# Patient Record
Sex: FEMALE | Race: WHITE | URBAN - METROPOLITAN AREA
[De-identification: names, ages, dates, MRNs, and addresses within clinical notes are randomized per-mention and may not be internally consistent; named-entity substitution may affect disease eponyms.]

---

## 2017-01-03 ENCOUNTER — AMBULATORY - HEALTHEAST (OUTPATIENT)
Dept: ONCOLOGY | Facility: CLINIC | Age: 82
End: 2017-01-03

## 2017-01-03 DIAGNOSIS — G62.9 NEUROPATHY: ICD-10-CM

## 2017-01-10 ENCOUNTER — AMBULATORY - HEALTHEAST (OUTPATIENT)
Dept: ONCOLOGY | Facility: CLINIC | Age: 82
End: 2017-01-10

## 2017-01-10 ENCOUNTER — HOSPITAL ENCOUNTER (OUTPATIENT)
Dept: CT IMAGING | Facility: HOSPITAL | Age: 82
Setting detail: RADIATION/ONCOLOGY SERIES
Discharge: STILL ADMITTED OR ADMINISTRATIVE | End: 2017-01-10
Attending: INTERNAL MEDICINE

## 2017-01-10 DIAGNOSIS — G62.9 NEUROPATHY: ICD-10-CM

## 2017-01-10 DIAGNOSIS — C34.11 MALIGNANT NEOPLASM OF UPPER LOBE BRONCHUS, RIGHT (H): ICD-10-CM

## 2017-01-12 ENCOUNTER — OFFICE VISIT - HEALTHEAST (OUTPATIENT)
Dept: ONCOLOGY | Facility: HOSPITAL | Age: 82
End: 2017-01-12

## 2017-01-12 ENCOUNTER — AMBULATORY - HEALTHEAST (OUTPATIENT)
Dept: INFUSION THERAPY | Facility: HOSPITAL | Age: 82
End: 2017-01-12

## 2017-01-12 ENCOUNTER — INFUSION - HEALTHEAST (OUTPATIENT)
Dept: INFUSION THERAPY | Facility: HOSPITAL | Age: 82
End: 2017-01-12

## 2017-01-12 DIAGNOSIS — C34.11 MALIGNANT NEOPLASM OF UPPER LOBE BRONCHUS, RIGHT (H): ICD-10-CM

## 2017-01-12 DIAGNOSIS — N18.9 CHRONIC KIDNEY DISEASE, UNSPECIFIED: ICD-10-CM

## 2017-01-16 ENCOUNTER — OFFICE VISIT - HEALTHEAST (OUTPATIENT)
Dept: INTERNAL MEDICINE | Facility: CLINIC | Age: 82
End: 2017-01-16

## 2017-01-16 ENCOUNTER — OFFICE VISIT - HEALTHEAST (OUTPATIENT)
Dept: PODIATRY | Facility: CLINIC | Age: 82
End: 2017-01-16

## 2017-01-16 ENCOUNTER — RECORDS - HEALTHEAST (OUTPATIENT)
Dept: ADMINISTRATIVE | Facility: OTHER | Age: 82
End: 2017-01-16

## 2017-01-16 DIAGNOSIS — K25.9 GASTRIC ULCER: ICD-10-CM

## 2017-01-16 DIAGNOSIS — L60.2 ONYCHAUXIS: ICD-10-CM

## 2017-01-16 DIAGNOSIS — G62.0 DRUG-INDUCED POLYNEUROPATHY (H): ICD-10-CM

## 2017-01-16 DIAGNOSIS — E11.9 DIABETES MELLITUS (H): ICD-10-CM

## 2017-01-16 DIAGNOSIS — R07.9 CHEST PAIN: ICD-10-CM

## 2017-01-16 DIAGNOSIS — E78.5 HYPERLIPIDEMIA: ICD-10-CM

## 2017-01-16 DIAGNOSIS — M79.673 PAIN OF FOOT, UNSPECIFIED LATERALITY: ICD-10-CM

## 2017-01-16 LAB
CHOLEST SERPL-MCNC: 169 MG/DL
HBA1C MFR BLD: 5.6 % (ref 3.5–6)
HDLC SERPL-MCNC: 50 MG/DL
LDLC SERPL CALC-MCNC: 99 MG/DL
TRIGL SERPL-MCNC: 102 MG/DL

## 2017-01-16 ASSESSMENT — MIFFLIN-ST. JEOR
SCORE: 911.25
SCORE: 907.62

## 2017-01-17 ENCOUNTER — AMBULATORY - HEALTHEAST (OUTPATIENT)
Dept: ONCOLOGY | Facility: CLINIC | Age: 82
End: 2017-01-17

## 2017-01-17 DIAGNOSIS — G62.9 NEUROPATHY: ICD-10-CM

## 2017-01-18 ENCOUNTER — HOSPITAL ENCOUNTER (OUTPATIENT)
Dept: CARDIOLOGY | Facility: HOSPITAL | Age: 82
Discharge: HOME | End: 2017-01-18
Attending: INTERNAL MEDICINE

## 2017-01-18 ENCOUNTER — HOSPITAL ENCOUNTER (OUTPATIENT)
Dept: NUCLEAR MEDICINE | Facility: HOSPITAL | Age: 82
Discharge: HOME | End: 2017-01-18
Attending: INTERNAL MEDICINE

## 2017-01-18 DIAGNOSIS — R07.9 CHEST PAIN: ICD-10-CM

## 2017-01-18 LAB
CV STRESS CURRENT BP HE: NORMAL
CV STRESS CURRENT HR HE: 111
CV STRESS CURRENT HR HE: 112
CV STRESS CURRENT HR HE: 115
CV STRESS CURRENT HR HE: 116
CV STRESS CURRENT HR HE: 117
CV STRESS CURRENT HR HE: 118
CV STRESS CURRENT HR HE: 119
CV STRESS CURRENT HR HE: 120
CV STRESS CURRENT HR HE: 121
CV STRESS CURRENT HR HE: 121
CV STRESS CURRENT HR HE: 122
CV STRESS CURRENT HR HE: 122
CV STRESS CURRENT HR HE: 124
CV STRESS CURRENT HR HE: 125
CV STRESS CURRENT HR HE: 129
CV STRESS CURRENT HR HE: 129
CV STRESS CURRENT HR HE: 142
CV STRESS CURRENT HR HE: 211
CV STRESS CURRENT HR HE: 227
CV STRESS CURRENT HR HE: NORMAL
CV STRESS DEVIATION TIME HE: NORMAL
CV STRESS EXERCISE STAGE HE: NORMAL
CV STRESS FINAL RESTING BP HE: NORMAL
CV STRESS MAX HR HE: 214
CV STRESS MAX TREADMILL GRADE HE: 0
CV STRESS MAX TREADMILL SPEED HE: 0
CV STRESS PEAK DIA BP HE: NORMAL
CV STRESS PEAK SYS BP HE: NORMAL
CV STRESS PHASE HE: NORMAL
CV STRESS PROTOCOL HE: NORMAL
CV STRESS RESTING PT POSITION HE: NORMAL
CV STRESS ST DEVIATION AMOUNT HE: NORMAL
CV STRESS ST DEVIATION ELEVATION HE: NORMAL
CV STRESS ST EVELATION AMOUNT HE: NORMAL
CV STRESS TEST TYPE HE: NORMAL
CV STRESS TOTAL STAGE TIME MIN 1 HE: NORMAL
STRESS ECHO BASELINE BP: NORMAL
STRESS ECHO BASELINE HR: 88
STRESS ECHO LAST STRESS BP: NORMAL
STRESS ECHO LAST STRESS HR: 221
STRESS ECHO POST ESTIMATED WORKLOAD: 1.8
STRESS ECHO POST EXERCISE DUR MIN: 1
STRESS ECHO POST EXERCISE DUR SEC: 0

## 2017-01-19 ENCOUNTER — AMBULATORY - HEALTHEAST (OUTPATIENT)
Dept: CARDIOLOGY | Facility: CLINIC | Age: 82
End: 2017-01-19

## 2017-01-19 DIAGNOSIS — R07.9 CHEST PAIN, UNSPECIFIED TYPE: ICD-10-CM

## 2017-01-19 DIAGNOSIS — R94.39 ABNORMAL NUCLEAR STRESS TEST: ICD-10-CM

## 2017-01-20 ENCOUNTER — COMMUNICATION - HEALTHEAST (OUTPATIENT)
Dept: SCHEDULING | Facility: CLINIC | Age: 82
End: 2017-01-20

## 2017-01-20 ENCOUNTER — OFFICE VISIT - HEALTHEAST (OUTPATIENT)
Dept: INTERNAL MEDICINE | Facility: CLINIC | Age: 82
End: 2017-01-20

## 2017-01-20 DIAGNOSIS — I47.10 SUPRAVENTRICULAR TACHYCARDIA (H): ICD-10-CM

## 2017-01-20 ASSESSMENT — MIFFLIN-ST. JEOR: SCORE: 924.41

## 2017-01-22 ENCOUNTER — RECORDS - HEALTHEAST (OUTPATIENT)
Dept: ADMINISTRATIVE | Facility: OTHER | Age: 82
End: 2017-01-22

## 2017-01-24 ENCOUNTER — AMBULATORY - HEALTHEAST (OUTPATIENT)
Dept: ONCOLOGY | Facility: CLINIC | Age: 82
End: 2017-01-24

## 2017-01-24 DIAGNOSIS — T45.1X5A NEUROPATHY DUE TO CHEMOTHERAPEUTIC DRUG (H): ICD-10-CM

## 2017-01-24 DIAGNOSIS — G62.0 NEUROPATHY DUE TO CHEMOTHERAPEUTIC DRUG (H): ICD-10-CM

## 2017-01-31 ENCOUNTER — AMBULATORY - HEALTHEAST (OUTPATIENT)
Dept: ONCOLOGY | Facility: CLINIC | Age: 82
End: 2017-01-31

## 2017-01-31 DIAGNOSIS — T45.1X5A NEUROPATHY DUE TO CHEMOTHERAPEUTIC DRUG (H): ICD-10-CM

## 2017-01-31 DIAGNOSIS — G62.0 NEUROPATHY DUE TO CHEMOTHERAPEUTIC DRUG (H): ICD-10-CM

## 2017-02-01 ENCOUNTER — COMMUNICATION - HEALTHEAST (OUTPATIENT)
Dept: CARDIOLOGY | Facility: CLINIC | Age: 82
End: 2017-02-01

## 2017-02-02 ENCOUNTER — INFUSION - HEALTHEAST (OUTPATIENT)
Dept: INFUSION THERAPY | Facility: HOSPITAL | Age: 82
End: 2017-02-02

## 2017-02-02 ENCOUNTER — COMMUNICATION - HEALTHEAST (OUTPATIENT)
Dept: INTERNAL MEDICINE | Facility: CLINIC | Age: 82
End: 2017-02-02

## 2017-02-02 ENCOUNTER — OFFICE VISIT - HEALTHEAST (OUTPATIENT)
Dept: ONCOLOGY | Facility: HOSPITAL | Age: 82
End: 2017-02-02

## 2017-02-02 ENCOUNTER — AMBULATORY - HEALTHEAST (OUTPATIENT)
Dept: INFUSION THERAPY | Facility: HOSPITAL | Age: 82
End: 2017-02-02

## 2017-02-02 ENCOUNTER — AMBULATORY - HEALTHEAST (OUTPATIENT)
Dept: INTERNAL MEDICINE | Facility: CLINIC | Age: 82
End: 2017-02-02

## 2017-02-02 DIAGNOSIS — C34.11 MALIGNANT NEOPLASM OF UPPER LOBE BRONCHUS, RIGHT (H): ICD-10-CM

## 2017-02-02 DIAGNOSIS — C16.9 GASTRIC CANCER (H): ICD-10-CM

## 2017-02-06 ENCOUNTER — AMBULATORY - HEALTHEAST (OUTPATIENT)
Dept: ONCOLOGY | Facility: CLINIC | Age: 82
End: 2017-02-06

## 2017-02-06 DIAGNOSIS — G57.93 NEUROPATHY OF BOTH FEET: ICD-10-CM

## 2017-02-06 DIAGNOSIS — G56.90: ICD-10-CM

## 2017-02-06 DIAGNOSIS — G62.0 NEUROPATHY DUE TO CHEMOTHERAPEUTIC DRUG (H): ICD-10-CM

## 2017-02-06 DIAGNOSIS — T45.1X5A NEUROPATHY DUE TO CHEMOTHERAPEUTIC DRUG (H): ICD-10-CM

## 2017-02-07 ENCOUNTER — RECORDS - HEALTHEAST (OUTPATIENT)
Dept: ADMINISTRATIVE | Facility: OTHER | Age: 82
End: 2017-02-07

## 2017-02-13 ENCOUNTER — AMBULATORY - HEALTHEAST (OUTPATIENT)
Dept: ONCOLOGY | Facility: CLINIC | Age: 82
End: 2017-02-13

## 2017-02-13 DIAGNOSIS — G56.90: ICD-10-CM

## 2017-02-13 DIAGNOSIS — T45.1X5A NEUROPATHY DUE TO CHEMOTHERAPEUTIC DRUG (H): ICD-10-CM

## 2017-02-13 DIAGNOSIS — G62.0 NEUROPATHY DUE TO CHEMOTHERAPEUTIC DRUG (H): ICD-10-CM

## 2017-02-13 DIAGNOSIS — G57.93 NEUROPATHY OF BOTH FEET: ICD-10-CM

## 2017-02-21 ENCOUNTER — AMBULATORY - HEALTHEAST (OUTPATIENT)
Dept: ONCOLOGY | Facility: CLINIC | Age: 82
End: 2017-02-21

## 2017-02-21 ENCOUNTER — OFFICE VISIT - HEALTHEAST (OUTPATIENT)
Dept: INTERNAL MEDICINE | Facility: CLINIC | Age: 82
End: 2017-02-21

## 2017-02-21 DIAGNOSIS — K29.61 OTHER GASTRITIS WITH BLEEDING: ICD-10-CM

## 2017-02-21 DIAGNOSIS — G56.91 NEUROPATHY OF HAND, RIGHT: ICD-10-CM

## 2017-02-21 DIAGNOSIS — E55.9 VITAMIN D DEFICIENCY: ICD-10-CM

## 2017-02-21 DIAGNOSIS — G57.93 NEUROPATHY OF BOTH FEET: ICD-10-CM

## 2017-02-21 DIAGNOSIS — G56.92 NEUROPATHY OF HAND, LEFT: ICD-10-CM

## 2017-02-21 DIAGNOSIS — T45.1X5A NEUROPATHY DUE TO CHEMOTHERAPEUTIC DRUG (H): ICD-10-CM

## 2017-02-21 DIAGNOSIS — G62.0 NEUROPATHY DUE TO CHEMOTHERAPEUTIC DRUG (H): ICD-10-CM

## 2017-02-21 DIAGNOSIS — R94.39 ABNORMAL NUCLEAR STRESS TEST: ICD-10-CM

## 2017-02-21 ASSESSMENT — MIFFLIN-ST. JEOR: SCORE: 915.34

## 2017-02-23 ENCOUNTER — INFUSION - HEALTHEAST (OUTPATIENT)
Dept: INFUSION THERAPY | Facility: HOSPITAL | Age: 82
End: 2017-02-23

## 2017-02-23 ENCOUNTER — OFFICE VISIT - HEALTHEAST (OUTPATIENT)
Dept: ONCOLOGY | Facility: HOSPITAL | Age: 82
End: 2017-02-23

## 2017-02-23 ENCOUNTER — AMBULATORY - HEALTHEAST (OUTPATIENT)
Dept: INFUSION THERAPY | Facility: HOSPITAL | Age: 82
End: 2017-02-23

## 2017-02-23 ENCOUNTER — COMMUNICATION - HEALTHEAST (OUTPATIENT)
Dept: INTERNAL MEDICINE | Facility: CLINIC | Age: 82
End: 2017-02-23

## 2017-02-23 DIAGNOSIS — T45.1X5A ANEMIA DUE TO ANTINEOPLASTIC CHEMOTHERAPY: ICD-10-CM

## 2017-02-23 DIAGNOSIS — C34.11 MALIGNANT NEOPLASM OF UPPER LOBE BRONCHUS, RIGHT (H): ICD-10-CM

## 2017-02-23 DIAGNOSIS — E55.9 VITAMIN D DEFICIENCY: ICD-10-CM

## 2017-02-23 DIAGNOSIS — D64.81 ANEMIA DUE TO ANTINEOPLASTIC CHEMOTHERAPY: ICD-10-CM

## 2017-02-24 ENCOUNTER — COMMUNICATION - HEALTHEAST (OUTPATIENT)
Dept: INTERNAL MEDICINE | Facility: CLINIC | Age: 82
End: 2017-02-24

## 2017-02-28 ENCOUNTER — AMBULATORY - HEALTHEAST (OUTPATIENT)
Dept: ONCOLOGY | Facility: CLINIC | Age: 82
End: 2017-02-28

## 2017-02-28 DIAGNOSIS — G62.0 NEUROPATHY DUE TO CHEMOTHERAPEUTIC DRUG (H): ICD-10-CM

## 2017-02-28 DIAGNOSIS — G57.93 NEUROPATHY OF BOTH FEET: ICD-10-CM

## 2017-02-28 DIAGNOSIS — G56.91 NEUROPATHY OF HAND, RIGHT: ICD-10-CM

## 2017-02-28 DIAGNOSIS — M79.602 PAIN IN LEFT ARM: ICD-10-CM

## 2017-02-28 DIAGNOSIS — G56.92 NEUROPATHY OF HAND, LEFT: ICD-10-CM

## 2017-02-28 DIAGNOSIS — T45.1X5A NEUROPATHY DUE TO CHEMOTHERAPEUTIC DRUG (H): ICD-10-CM

## 2017-03-13 ENCOUNTER — AMBULATORY - HEALTHEAST (OUTPATIENT)
Dept: ONCOLOGY | Facility: CLINIC | Age: 82
End: 2017-03-13

## 2017-03-13 ENCOUNTER — HOSPITAL ENCOUNTER (OUTPATIENT)
Dept: PET IMAGING | Facility: HOSPITAL | Age: 82
Setting detail: RADIATION/ONCOLOGY SERIES
Discharge: STILL ADMITTED OR ADMINISTRATIVE | End: 2017-03-13
Attending: INTERNAL MEDICINE

## 2017-03-13 DIAGNOSIS — G62.0 NEUROPATHY DUE TO CHEMOTHERAPEUTIC DRUG (H): ICD-10-CM

## 2017-03-13 DIAGNOSIS — G56.92 NEUROPATHY OF HAND, LEFT: ICD-10-CM

## 2017-03-13 DIAGNOSIS — M79.602 PAIN IN LEFT ARM: ICD-10-CM

## 2017-03-13 DIAGNOSIS — C34.11 MALIGNANT NEOPLASM OF UPPER LOBE BRONCHUS, RIGHT (H): ICD-10-CM

## 2017-03-13 DIAGNOSIS — T45.1X5A NEUROPATHY DUE TO CHEMOTHERAPEUTIC DRUG (H): ICD-10-CM

## 2017-03-13 DIAGNOSIS — G57.93 NEUROPATHY OF BOTH FEET: ICD-10-CM

## 2017-03-13 DIAGNOSIS — G56.91 NEUROPATHY OF HAND, RIGHT: ICD-10-CM

## 2017-03-15 ENCOUNTER — OFFICE VISIT - HEALTHEAST (OUTPATIENT)
Dept: ONCOLOGY | Facility: HOSPITAL | Age: 82
End: 2017-03-15

## 2017-03-15 ENCOUNTER — AMBULATORY - HEALTHEAST (OUTPATIENT)
Dept: INFUSION THERAPY | Facility: HOSPITAL | Age: 82
End: 2017-03-15

## 2017-03-15 ENCOUNTER — COMMUNICATION - HEALTHEAST (OUTPATIENT)
Dept: ONCOLOGY | Facility: CLINIC | Age: 82
End: 2017-03-15

## 2017-03-15 ENCOUNTER — INFUSION - HEALTHEAST (OUTPATIENT)
Dept: INFUSION THERAPY | Facility: HOSPITAL | Age: 82
End: 2017-03-15

## 2017-03-15 DIAGNOSIS — C34.11 MALIGNANT NEOPLASM OF UPPER LOBE BRONCHUS, RIGHT (H): ICD-10-CM

## 2017-03-21 ENCOUNTER — AMBULATORY - HEALTHEAST (OUTPATIENT)
Dept: ONCOLOGY | Facility: CLINIC | Age: 82
End: 2017-03-21

## 2017-03-21 DIAGNOSIS — G56.91 NEUROPATHY OF HAND, RIGHT: ICD-10-CM

## 2017-03-21 DIAGNOSIS — G57.93 NEUROPATHY OF BOTH FEET: ICD-10-CM

## 2017-03-21 DIAGNOSIS — T45.1X5A NEUROPATHY DUE TO CHEMOTHERAPEUTIC DRUG (H): ICD-10-CM

## 2017-03-21 DIAGNOSIS — G56.92 NEUROPATHY OF HAND, LEFT: ICD-10-CM

## 2017-03-21 DIAGNOSIS — G62.0 NEUROPATHY DUE TO CHEMOTHERAPEUTIC DRUG (H): ICD-10-CM

## 2017-03-27 ENCOUNTER — COMMUNICATION - HEALTHEAST (OUTPATIENT)
Dept: INTERNAL MEDICINE | Facility: CLINIC | Age: 82
End: 2017-03-27

## 2017-03-27 ENCOUNTER — OFFICE VISIT - HEALTHEAST (OUTPATIENT)
Dept: INTERNAL MEDICINE | Facility: CLINIC | Age: 82
End: 2017-03-27

## 2017-03-27 DIAGNOSIS — E11.9 TYPE 2 DIABETES MELLITUS (H): ICD-10-CM

## 2017-03-27 DIAGNOSIS — K92.1 MELENA: ICD-10-CM

## 2017-03-27 LAB — HBA1C MFR BLD: 6.2 % (ref 3.5–6)

## 2017-03-27 ASSESSMENT — MIFFLIN-ST. JEOR: SCORE: 913.52

## 2017-03-28 ENCOUNTER — COMMUNICATION - HEALTHEAST (OUTPATIENT)
Dept: INTERNAL MEDICINE | Facility: CLINIC | Age: 82
End: 2017-03-28

## 2017-03-28 ENCOUNTER — AMBULATORY - HEALTHEAST (OUTPATIENT)
Dept: ONCOLOGY | Facility: CLINIC | Age: 82
End: 2017-03-28

## 2017-03-28 DIAGNOSIS — M25.512 LEFT ANTERIOR SHOULDER PAIN: ICD-10-CM

## 2017-03-28 DIAGNOSIS — G57.93 NEUROPATHY OF BOTH FEET: ICD-10-CM

## 2017-03-28 DIAGNOSIS — G56.92 NEUROPATHY OF HAND, LEFT: ICD-10-CM

## 2017-03-28 DIAGNOSIS — G56.91 NEUROPATHY OF HAND, RIGHT: ICD-10-CM

## 2017-04-03 ENCOUNTER — AMBULATORY - HEALTHEAST (OUTPATIENT)
Dept: ONCOLOGY | Facility: CLINIC | Age: 82
End: 2017-04-03

## 2017-04-03 DIAGNOSIS — G57.93 NEUROPATHY OF BOTH FEET: ICD-10-CM

## 2017-04-03 DIAGNOSIS — G62.0 NEUROPATHY DUE TO CHEMOTHERAPEUTIC DRUG (H): ICD-10-CM

## 2017-04-03 DIAGNOSIS — T45.1X5A NEUROPATHY DUE TO CHEMOTHERAPEUTIC DRUG (H): ICD-10-CM

## 2017-04-03 DIAGNOSIS — G56.91 NEUROPATHY OF HAND, RIGHT: ICD-10-CM

## 2017-04-03 DIAGNOSIS — G56.92 NEUROPATHY OF HAND, LEFT: ICD-10-CM

## 2017-04-05 ENCOUNTER — AMBULATORY - HEALTHEAST (OUTPATIENT)
Dept: INFUSION THERAPY | Facility: HOSPITAL | Age: 82
End: 2017-04-05

## 2017-04-05 ENCOUNTER — OFFICE VISIT - HEALTHEAST (OUTPATIENT)
Dept: ONCOLOGY | Facility: HOSPITAL | Age: 82
End: 2017-04-05

## 2017-04-05 ENCOUNTER — INFUSION - HEALTHEAST (OUTPATIENT)
Dept: INFUSION THERAPY | Facility: HOSPITAL | Age: 82
End: 2017-04-05

## 2017-04-05 DIAGNOSIS — R73.01 ELEVATED FASTING BLOOD SUGAR: ICD-10-CM

## 2017-04-05 DIAGNOSIS — G62.0 CHEMOTHERAPY-INDUCED PERIPHERAL NEUROPATHY (H): ICD-10-CM

## 2017-04-05 DIAGNOSIS — C34.11 MALIGNANT NEOPLASM OF UPPER LOBE BRONCHUS, RIGHT (H): ICD-10-CM

## 2017-04-05 DIAGNOSIS — T45.1X5A ANTINEOPLASTIC CHEMOTHERAPY INDUCED ANEMIA: ICD-10-CM

## 2017-04-05 DIAGNOSIS — N18.9 CHRONIC KIDNEY DISEASE, UNSPECIFIED: ICD-10-CM

## 2017-04-05 DIAGNOSIS — D64.81 ANTINEOPLASTIC CHEMOTHERAPY INDUCED ANEMIA: ICD-10-CM

## 2017-04-05 DIAGNOSIS — T45.1X5A CHEMOTHERAPY-INDUCED PERIPHERAL NEUROPATHY (H): ICD-10-CM

## 2017-04-05 ASSESSMENT — MIFFLIN-ST. JEOR: SCORE: 912.16

## 2017-04-06 ENCOUNTER — AMBULATORY - HEALTHEAST (OUTPATIENT)
Dept: ONCOLOGY | Facility: CLINIC | Age: 82
End: 2017-04-06

## 2017-04-06 DIAGNOSIS — G62.0 CHEMOTHERAPY-INDUCED PERIPHERAL NEUROPATHY (H): ICD-10-CM

## 2017-04-06 DIAGNOSIS — G57.93 NEUROPATHY OF BOTH FEET: ICD-10-CM

## 2017-04-06 DIAGNOSIS — T45.1X5A CHEMOTHERAPY-INDUCED PERIPHERAL NEUROPATHY (H): ICD-10-CM

## 2017-04-06 DIAGNOSIS — G56.92 NEUROPATHY OF HAND, LEFT: ICD-10-CM

## 2017-04-06 DIAGNOSIS — G56.91 NEUROPATHY OF HAND, RIGHT: ICD-10-CM

## 2017-04-13 ENCOUNTER — OFFICE VISIT - HEALTHEAST (OUTPATIENT)
Dept: CARDIOLOGY | Facility: CLINIC | Age: 82
End: 2017-04-13

## 2017-04-13 DIAGNOSIS — I25.10 ATHEROSCLEROSIS OF NATIVE CORONARY ARTERY OF NATIVE HEART WITHOUT ANGINA PECTORIS: ICD-10-CM

## 2017-04-13 DIAGNOSIS — I48.0 PAROXYSMAL ATRIAL FIBRILLATION (H): ICD-10-CM

## 2017-04-13 ASSESSMENT — MIFFLIN-ST. JEOR: SCORE: 907.62

## 2017-04-17 ENCOUNTER — AMBULATORY - HEALTHEAST (OUTPATIENT)
Dept: ONCOLOGY | Facility: CLINIC | Age: 82
End: 2017-04-17

## 2017-04-17 DIAGNOSIS — G56.91 NEUROPATHY OF HAND, RIGHT: ICD-10-CM

## 2017-04-17 DIAGNOSIS — T45.1X5A CHEMOTHERAPY-INDUCED PERIPHERAL NEUROPATHY (H): ICD-10-CM

## 2017-04-17 DIAGNOSIS — G62.0 CHEMOTHERAPY-INDUCED PERIPHERAL NEUROPATHY (H): ICD-10-CM

## 2017-04-17 DIAGNOSIS — G57.93 NEUROPATHY OF BOTH FEET: ICD-10-CM

## 2017-04-17 DIAGNOSIS — G56.92 NEUROPATHY OF HAND, LEFT: ICD-10-CM

## 2017-04-20 ENCOUNTER — AMBULATORY - HEALTHEAST (OUTPATIENT)
Dept: ONCOLOGY | Facility: CLINIC | Age: 82
End: 2017-04-20

## 2017-04-20 DIAGNOSIS — G57.93 NEUROPATHY OF BOTH FEET: ICD-10-CM

## 2017-04-20 DIAGNOSIS — R53.83 FATIGUE: ICD-10-CM

## 2017-04-20 DIAGNOSIS — G56.91 NEUROPATHY OF HAND, RIGHT: ICD-10-CM

## 2017-04-20 DIAGNOSIS — T45.1X5A CHEMOTHERAPY-INDUCED PERIPHERAL NEUROPATHY (H): ICD-10-CM

## 2017-04-20 DIAGNOSIS — G56.92 NEUROPATHY OF HAND, LEFT: ICD-10-CM

## 2017-04-20 DIAGNOSIS — G62.0 CHEMOTHERAPY-INDUCED PERIPHERAL NEUROPATHY (H): ICD-10-CM

## 2017-04-24 ENCOUNTER — AMBULATORY - HEALTHEAST (OUTPATIENT)
Dept: ONCOLOGY | Facility: CLINIC | Age: 82
End: 2017-04-24

## 2017-04-24 DIAGNOSIS — G62.0 CHEMOTHERAPY-INDUCED PERIPHERAL NEUROPATHY (H): ICD-10-CM

## 2017-04-24 DIAGNOSIS — G56.92 NEUROPATHY OF HAND, LEFT: ICD-10-CM

## 2017-04-24 DIAGNOSIS — T45.1X5A CHEMOTHERAPY-INDUCED PERIPHERAL NEUROPATHY (H): ICD-10-CM

## 2017-04-24 DIAGNOSIS — G57.93 NEUROPATHY OF BOTH FEET: ICD-10-CM

## 2017-04-24 DIAGNOSIS — G56.91 NEUROPATHY OF HAND, RIGHT: ICD-10-CM

## 2017-04-27 ENCOUNTER — OFFICE VISIT - HEALTHEAST (OUTPATIENT)
Dept: ONCOLOGY | Facility: HOSPITAL | Age: 82
End: 2017-04-27

## 2017-04-27 ENCOUNTER — AMBULATORY - HEALTHEAST (OUTPATIENT)
Dept: ONCOLOGY | Facility: CLINIC | Age: 82
End: 2017-04-27

## 2017-04-27 ENCOUNTER — INFUSION - HEALTHEAST (OUTPATIENT)
Dept: INFUSION THERAPY | Facility: HOSPITAL | Age: 82
End: 2017-04-27

## 2017-04-27 ENCOUNTER — AMBULATORY - HEALTHEAST (OUTPATIENT)
Dept: INFUSION THERAPY | Facility: HOSPITAL | Age: 82
End: 2017-04-27

## 2017-04-27 DIAGNOSIS — G56.92 NEUROPATHY OF HAND, LEFT: ICD-10-CM

## 2017-04-27 DIAGNOSIS — C34.11 MALIGNANT NEOPLASM OF UPPER LOBE BRONCHUS, RIGHT (H): ICD-10-CM

## 2017-04-27 DIAGNOSIS — G62.0 CHEMOTHERAPY-INDUCED PERIPHERAL NEUROPATHY (H): ICD-10-CM

## 2017-04-27 DIAGNOSIS — G56.91 NEUROPATHY OF HAND, RIGHT: ICD-10-CM

## 2017-04-27 DIAGNOSIS — T45.1X5A CHEMOTHERAPY-INDUCED PERIPHERAL NEUROPATHY (H): ICD-10-CM

## 2017-04-27 DIAGNOSIS — G57.93 NEUROPATHY OF BOTH FEET: ICD-10-CM

## 2017-04-28 ENCOUNTER — COMMUNICATION - HEALTHEAST (OUTPATIENT)
Dept: ONCOLOGY | Facility: HOSPITAL | Age: 82
End: 2017-04-28

## 2017-04-28 ENCOUNTER — HOSPITAL ENCOUNTER (OUTPATIENT)
Dept: MRI IMAGING | Facility: CLINIC | Age: 82
Setting detail: RADIATION/ONCOLOGY SERIES
Discharge: STILL ADMITTED OR ADMINISTRATIVE | End: 2017-04-28
Attending: INTERNAL MEDICINE

## 2017-04-28 ENCOUNTER — HOSPITAL ENCOUNTER (OUTPATIENT)
Dept: MRI IMAGING | Facility: HOSPITAL | Age: 82
Setting detail: RADIATION/ONCOLOGY SERIES
Discharge: STILL ADMITTED OR ADMINISTRATIVE | End: 2017-04-28
Attending: INTERNAL MEDICINE

## 2017-04-28 DIAGNOSIS — C34.11 MALIGNANT NEOPLASM OF UPPER LOBE BRONCHUS, RIGHT (H): ICD-10-CM

## 2017-04-28 DIAGNOSIS — T45.1X5A CHEMOTHERAPY-INDUCED PERIPHERAL NEUROPATHY (H): ICD-10-CM

## 2017-04-28 DIAGNOSIS — G62.0 CHEMOTHERAPY-INDUCED PERIPHERAL NEUROPATHY (H): ICD-10-CM

## 2017-05-01 ENCOUNTER — AMBULATORY - HEALTHEAST (OUTPATIENT)
Dept: ONCOLOGY | Facility: CLINIC | Age: 82
End: 2017-05-01

## 2017-05-01 ENCOUNTER — AMBULATORY - HEALTHEAST (OUTPATIENT)
Dept: PODIATRY | Facility: CLINIC | Age: 82
End: 2017-05-01

## 2017-05-01 DIAGNOSIS — T45.1X5A CHEMOTHERAPY-INDUCED PERIPHERAL NEUROPATHY (H): ICD-10-CM

## 2017-05-01 DIAGNOSIS — G56.91 NEUROPATHY OF HAND, RIGHT: ICD-10-CM

## 2017-05-01 DIAGNOSIS — M79.673 PAIN OF FOOT, UNSPECIFIED LATERALITY: ICD-10-CM

## 2017-05-01 DIAGNOSIS — G56.92 NEUROPATHY OF HAND, LEFT: ICD-10-CM

## 2017-05-01 DIAGNOSIS — E11.49 TYPE 2 DIABETES MELLITUS WITH NEUROLOGICAL MANIFESTATIONS (H): ICD-10-CM

## 2017-05-01 DIAGNOSIS — L60.2 ONYCHAUXIS: ICD-10-CM

## 2017-05-01 DIAGNOSIS — R53.83 FATIGUE: ICD-10-CM

## 2017-05-01 DIAGNOSIS — G57.93 NEUROPATHY OF BOTH FEET: ICD-10-CM

## 2017-05-01 DIAGNOSIS — G62.0 CHEMOTHERAPY-INDUCED PERIPHERAL NEUROPATHY (H): ICD-10-CM

## 2017-05-01 DIAGNOSIS — G62.0 DRUG-INDUCED POLYNEUROPATHY (H): ICD-10-CM

## 2017-05-03 ENCOUNTER — COMMUNICATION - HEALTHEAST (OUTPATIENT)
Dept: INTERNAL MEDICINE | Facility: CLINIC | Age: 82
End: 2017-05-03

## 2017-05-03 DIAGNOSIS — I25.9 ISCHEMIC HEART DISEASE: ICD-10-CM

## 2017-05-03 DIAGNOSIS — E78.5 HYPERLIPIDEMIA: ICD-10-CM

## 2017-05-04 ENCOUNTER — AMBULATORY - HEALTHEAST (OUTPATIENT)
Dept: ONCOLOGY | Facility: CLINIC | Age: 82
End: 2017-05-04

## 2017-05-04 DIAGNOSIS — G56.91 NEUROPATHY OF HAND, RIGHT: ICD-10-CM

## 2017-05-04 DIAGNOSIS — T45.1X5A CHEMOTHERAPY-INDUCED PERIPHERAL NEUROPATHY (H): ICD-10-CM

## 2017-05-04 DIAGNOSIS — G62.0 CHEMOTHERAPY-INDUCED PERIPHERAL NEUROPATHY (H): ICD-10-CM

## 2017-05-04 DIAGNOSIS — G57.93 NEUROPATHY OF BOTH FEET: ICD-10-CM

## 2017-05-04 DIAGNOSIS — G56.92 NEUROPATHY OF HAND, LEFT: ICD-10-CM

## 2017-05-08 ENCOUNTER — AMBULATORY - HEALTHEAST (OUTPATIENT)
Dept: ONCOLOGY | Facility: CLINIC | Age: 82
End: 2017-05-08

## 2017-05-08 DIAGNOSIS — G57.93 NEUROPATHY OF BOTH FEET: ICD-10-CM

## 2017-05-08 DIAGNOSIS — T45.1X5A CHEMOTHERAPY-INDUCED PERIPHERAL NEUROPATHY (H): ICD-10-CM

## 2017-05-08 DIAGNOSIS — G56.92 NEUROPATHY OF HAND, LEFT: ICD-10-CM

## 2017-05-08 DIAGNOSIS — G56.91 NEUROPATHY OF HAND, RIGHT: ICD-10-CM

## 2017-05-08 DIAGNOSIS — G62.0 CHEMOTHERAPY-INDUCED PERIPHERAL NEUROPATHY (H): ICD-10-CM

## 2017-05-11 ENCOUNTER — AMBULATORY - HEALTHEAST (OUTPATIENT)
Dept: ONCOLOGY | Facility: CLINIC | Age: 82
End: 2017-05-11

## 2017-05-11 DIAGNOSIS — G56.91 NEUROPATHY OF HAND, RIGHT: ICD-10-CM

## 2017-05-11 DIAGNOSIS — G57.93 NEUROPATHY OF BOTH FEET: ICD-10-CM

## 2017-05-11 DIAGNOSIS — R53.83 FATIGUE: ICD-10-CM

## 2017-05-11 DIAGNOSIS — G62.0 CHEMOTHERAPY-INDUCED PERIPHERAL NEUROPATHY (H): ICD-10-CM

## 2017-05-11 DIAGNOSIS — T45.1X5A CHEMOTHERAPY-INDUCED PERIPHERAL NEUROPATHY (H): ICD-10-CM

## 2017-05-11 DIAGNOSIS — G56.92 NEUROPATHY OF HAND, LEFT: ICD-10-CM

## 2017-05-14 ENCOUNTER — COMMUNICATION - HEALTHEAST (OUTPATIENT)
Dept: SCHEDULING | Facility: CLINIC | Age: 82
End: 2017-05-14

## 2017-05-15 ENCOUNTER — AMBULATORY - HEALTHEAST (OUTPATIENT)
Dept: ONCOLOGY | Facility: CLINIC | Age: 82
End: 2017-05-15

## 2017-05-15 DIAGNOSIS — T45.1X5A CHEMOTHERAPY-INDUCED PERIPHERAL NEUROPATHY (H): ICD-10-CM

## 2017-05-15 DIAGNOSIS — G56.91 NEUROPATHY OF HAND, RIGHT: ICD-10-CM

## 2017-05-15 DIAGNOSIS — G57.93 NEUROPATHY OF BOTH FEET: ICD-10-CM

## 2017-05-15 DIAGNOSIS — G62.0 CHEMOTHERAPY-INDUCED PERIPHERAL NEUROPATHY (H): ICD-10-CM

## 2017-05-15 DIAGNOSIS — G56.92 NEUROPATHY OF HAND, LEFT: ICD-10-CM

## 2017-05-17 ENCOUNTER — COMMUNICATION - HEALTHEAST (OUTPATIENT)
Dept: ONCOLOGY | Facility: CLINIC | Age: 82
End: 2017-05-17

## 2017-05-18 ENCOUNTER — AMBULATORY - HEALTHEAST (OUTPATIENT)
Dept: ONCOLOGY | Facility: CLINIC | Age: 82
End: 2017-05-18

## 2017-05-18 DIAGNOSIS — G56.91 NEUROPATHY OF HAND, RIGHT: ICD-10-CM

## 2017-05-18 DIAGNOSIS — G57.93 NEUROPATHY OF BOTH FEET: ICD-10-CM

## 2017-05-18 DIAGNOSIS — T45.1X5A CHEMOTHERAPY-INDUCED PERIPHERAL NEUROPATHY (H): ICD-10-CM

## 2017-05-18 DIAGNOSIS — G56.92 NEUROPATHY OF HAND, LEFT: ICD-10-CM

## 2017-05-18 DIAGNOSIS — G62.0 CHEMOTHERAPY-INDUCED PERIPHERAL NEUROPATHY (H): ICD-10-CM

## 2017-05-22 ENCOUNTER — HOSPITAL ENCOUNTER (OUTPATIENT)
Dept: PET IMAGING | Facility: HOSPITAL | Age: 82
Setting detail: RADIATION/ONCOLOGY SERIES
Discharge: STILL ADMITTED OR ADMINISTRATIVE | End: 2017-05-22
Attending: INTERNAL MEDICINE

## 2017-05-22 ENCOUNTER — AMBULATORY - HEALTHEAST (OUTPATIENT)
Dept: ONCOLOGY | Facility: CLINIC | Age: 82
End: 2017-05-22

## 2017-05-22 ENCOUNTER — HOSPITAL ENCOUNTER (OUTPATIENT)
Dept: PET IMAGING | Facility: HOSPITAL | Age: 82
Setting detail: RADIATION/ONCOLOGY SERIES
Discharge: STILL ADMITTED OR ADMINISTRATIVE | End: 2017-05-22
Attending: INTERNAL MEDICINE
Payer: MEDICARE

## 2017-05-22 DIAGNOSIS — T45.1X5A CHEMOTHERAPY-INDUCED PERIPHERAL NEUROPATHY (H): ICD-10-CM

## 2017-05-22 DIAGNOSIS — G62.0 CHEMOTHERAPY-INDUCED PERIPHERAL NEUROPATHY (H): ICD-10-CM

## 2017-05-22 DIAGNOSIS — G57.93 NEUROPATHY OF BOTH FEET: ICD-10-CM

## 2017-05-22 DIAGNOSIS — G56.92 NEUROPATHY OF HAND, LEFT: ICD-10-CM

## 2017-05-22 DIAGNOSIS — G56.91 NEUROPATHY OF HAND, RIGHT: ICD-10-CM

## 2017-05-22 DIAGNOSIS — C34.11 MALIGNANT NEOPLASM OF UPPER LOBE BRONCHUS, RIGHT (H): ICD-10-CM

## 2017-05-22 DIAGNOSIS — R53.83 FATIGUE: ICD-10-CM

## 2017-05-22 ASSESSMENT — MIFFLIN-ST. JEOR: SCORE: 903.09

## 2017-05-25 ENCOUNTER — OFFICE VISIT - HEALTHEAST (OUTPATIENT)
Dept: ONCOLOGY | Facility: HOSPITAL | Age: 82
End: 2017-05-25

## 2017-05-25 ENCOUNTER — AMBULATORY - HEALTHEAST (OUTPATIENT)
Dept: ONCOLOGY | Facility: HOSPITAL | Age: 82
End: 2017-05-25

## 2017-05-25 ENCOUNTER — AMBULATORY - HEALTHEAST (OUTPATIENT)
Dept: ONCOLOGY | Facility: CLINIC | Age: 82
End: 2017-05-25

## 2017-05-25 ENCOUNTER — AMBULATORY - HEALTHEAST (OUTPATIENT)
Dept: INFUSION THERAPY | Facility: HOSPITAL | Age: 82
End: 2017-05-25

## 2017-05-25 DIAGNOSIS — C34.11 MALIGNANT NEOPLASM OF UPPER LOBE BRONCHUS, RIGHT (H): ICD-10-CM

## 2017-05-25 DIAGNOSIS — G57.93 NEUROPATHY OF BOTH FEET: ICD-10-CM

## 2017-05-25 DIAGNOSIS — G56.92 NEUROPATHY OF HAND, LEFT: ICD-10-CM

## 2017-05-25 DIAGNOSIS — R53.83 FATIGUE: ICD-10-CM

## 2017-05-25 DIAGNOSIS — G62.0 CHEMOTHERAPY-INDUCED PERIPHERAL NEUROPATHY (H): ICD-10-CM

## 2017-05-25 DIAGNOSIS — G56.91 NEUROPATHY OF HAND, RIGHT: ICD-10-CM

## 2017-05-25 DIAGNOSIS — T45.1X5A CHEMOTHERAPY-INDUCED PERIPHERAL NEUROPATHY (H): ICD-10-CM

## 2017-05-26 ENCOUNTER — INFUSION - HEALTHEAST (OUTPATIENT)
Dept: INFUSION THERAPY | Facility: HOSPITAL | Age: 82
End: 2017-05-26

## 2017-05-26 ENCOUNTER — AMBULATORY - HEALTHEAST (OUTPATIENT)
Dept: ONCOLOGY | Facility: CLINIC | Age: 82
End: 2017-05-26

## 2017-05-26 DIAGNOSIS — T45.1X5A ANTINEOPLASTIC CHEMOTHERAPY INDUCED ANEMIA: ICD-10-CM

## 2017-05-26 DIAGNOSIS — C34.11 MALIGNANT NEOPLASM OF UPPER LOBE BRONCHUS, RIGHT (H): ICD-10-CM

## 2017-05-26 DIAGNOSIS — D64.81 ANTINEOPLASTIC CHEMOTHERAPY INDUCED ANEMIA: ICD-10-CM

## 2017-05-30 ENCOUNTER — COMMUNICATION - HEALTHEAST (OUTPATIENT)
Dept: ONCOLOGY | Facility: CLINIC | Age: 82
End: 2017-05-30

## 2017-05-30 ENCOUNTER — AMBULATORY - HEALTHEAST (OUTPATIENT)
Dept: ONCOLOGY | Facility: CLINIC | Age: 82
End: 2017-05-30

## 2017-05-30 DIAGNOSIS — G56.92 NEUROPATHY OF HAND, LEFT: ICD-10-CM

## 2017-05-30 DIAGNOSIS — G62.0 CHEMOTHERAPY-INDUCED PERIPHERAL NEUROPATHY (H): ICD-10-CM

## 2017-05-30 DIAGNOSIS — G56.91 NEUROPATHY OF HAND, RIGHT: ICD-10-CM

## 2017-05-30 DIAGNOSIS — T45.1X5A CHEMOTHERAPY-INDUCED PERIPHERAL NEUROPATHY (H): ICD-10-CM

## 2017-05-30 DIAGNOSIS — G57.93 NEUROPATHY OF BOTH FEET: ICD-10-CM

## 2017-05-30 DIAGNOSIS — R53.83 FATIGUE: ICD-10-CM

## 2017-06-01 ENCOUNTER — AMBULATORY - HEALTHEAST (OUTPATIENT)
Dept: ONCOLOGY | Facility: CLINIC | Age: 82
End: 2017-06-01

## 2017-06-01 DIAGNOSIS — R53.83 FATIGUE: ICD-10-CM

## 2017-06-01 DIAGNOSIS — G62.0 CHEMOTHERAPY-INDUCED PERIPHERAL NEUROPATHY (H): ICD-10-CM

## 2017-06-01 DIAGNOSIS — G57.93 NEUROPATHY OF BOTH FEET: ICD-10-CM

## 2017-06-01 DIAGNOSIS — G56.92 NEUROPATHY OF HAND, LEFT: ICD-10-CM

## 2017-06-01 DIAGNOSIS — T45.1X5A CHEMOTHERAPY-INDUCED PERIPHERAL NEUROPATHY (H): ICD-10-CM

## 2017-06-01 DIAGNOSIS — G56.91 NEUROPATHY OF HAND, RIGHT: ICD-10-CM

## 2017-06-05 ENCOUNTER — AMBULATORY - HEALTHEAST (OUTPATIENT)
Dept: ONCOLOGY | Facility: CLINIC | Age: 82
End: 2017-06-05

## 2017-06-05 DIAGNOSIS — T45.1X5A CHEMOTHERAPY-INDUCED PERIPHERAL NEUROPATHY (H): ICD-10-CM

## 2017-06-05 DIAGNOSIS — G57.93 NEUROPATHY OF BOTH FEET: ICD-10-CM

## 2017-06-05 DIAGNOSIS — G62.0 CHEMOTHERAPY-INDUCED PERIPHERAL NEUROPATHY (H): ICD-10-CM

## 2017-06-05 DIAGNOSIS — G56.91 NEUROPATHY OF HAND, RIGHT: ICD-10-CM

## 2017-06-05 DIAGNOSIS — G56.92 NEUROPATHY OF HAND, LEFT: ICD-10-CM

## 2017-06-08 ENCOUNTER — AMBULATORY - HEALTHEAST (OUTPATIENT)
Dept: ONCOLOGY | Facility: CLINIC | Age: 82
End: 2017-06-08

## 2017-06-08 DIAGNOSIS — G57.93 NEUROPATHY OF BOTH FEET: ICD-10-CM

## 2017-06-08 DIAGNOSIS — T45.1X5A CHEMOTHERAPY-INDUCED PERIPHERAL NEUROPATHY (H): ICD-10-CM

## 2017-06-08 DIAGNOSIS — G62.0 CHEMOTHERAPY-INDUCED PERIPHERAL NEUROPATHY (H): ICD-10-CM

## 2017-06-08 DIAGNOSIS — G56.91 NEUROPATHY OF HAND, RIGHT: ICD-10-CM

## 2017-06-08 DIAGNOSIS — G56.92 NEUROPATHY OF HAND, LEFT: ICD-10-CM

## 2017-06-15 ENCOUNTER — OFFICE VISIT - HEALTHEAST (OUTPATIENT)
Dept: ONCOLOGY | Facility: HOSPITAL | Age: 82
End: 2017-06-15

## 2017-06-15 ENCOUNTER — INFUSION - HEALTHEAST (OUTPATIENT)
Dept: INFUSION THERAPY | Facility: HOSPITAL | Age: 82
End: 2017-06-15

## 2017-06-15 ENCOUNTER — AMBULATORY - HEALTHEAST (OUTPATIENT)
Dept: INFUSION THERAPY | Facility: HOSPITAL | Age: 82
End: 2017-06-15

## 2017-06-15 DIAGNOSIS — C34.11 MALIGNANT NEOPLASM OF UPPER LOBE BRONCHUS, RIGHT (H): ICD-10-CM

## 2017-06-15 DIAGNOSIS — D64.81 ANTINEOPLASTIC CHEMOTHERAPY INDUCED ANEMIA: ICD-10-CM

## 2017-06-15 DIAGNOSIS — N18.9 CHRONIC KIDNEY DISEASE, UNSPECIFIED: ICD-10-CM

## 2017-06-15 DIAGNOSIS — T45.1X5A ANTINEOPLASTIC CHEMOTHERAPY INDUCED ANEMIA: ICD-10-CM

## 2017-06-19 ENCOUNTER — OFFICE VISIT - HEALTHEAST (OUTPATIENT)
Dept: INTERNAL MEDICINE | Facility: CLINIC | Age: 82
End: 2017-06-19

## 2017-06-19 ENCOUNTER — AMBULATORY - HEALTHEAST (OUTPATIENT)
Dept: ONCOLOGY | Facility: CLINIC | Age: 82
End: 2017-06-19

## 2017-06-19 DIAGNOSIS — I10 ESSENTIAL HYPERTENSION: ICD-10-CM

## 2017-06-19 DIAGNOSIS — G56.91 NEUROPATHY OF HAND, RIGHT: ICD-10-CM

## 2017-06-19 DIAGNOSIS — G57.93 NEUROPATHY OF BOTH FEET: ICD-10-CM

## 2017-06-19 DIAGNOSIS — G56.92 NEUROPATHY OF HAND, LEFT: ICD-10-CM

## 2017-06-19 ASSESSMENT — MIFFLIN-ST. JEOR: SCORE: 892.66

## 2017-06-22 ENCOUNTER — AMBULATORY - HEALTHEAST (OUTPATIENT)
Dept: ONCOLOGY | Facility: CLINIC | Age: 82
End: 2017-06-22

## 2017-06-22 DIAGNOSIS — G62.0 NEUROPATHY DUE TO CHEMOTHERAPEUTIC DRUG (H): ICD-10-CM

## 2017-06-22 DIAGNOSIS — G56.92 NEUROPATHY OF HAND, LEFT: ICD-10-CM

## 2017-06-22 DIAGNOSIS — T45.1X5A CHEMOTHERAPY-INDUCED PERIPHERAL NEUROPATHY (H): ICD-10-CM

## 2017-06-22 DIAGNOSIS — G56.91 NEUROPATHY OF HAND, RIGHT: ICD-10-CM

## 2017-06-22 DIAGNOSIS — T45.1X5A NEUROPATHY DUE TO CHEMOTHERAPEUTIC DRUG (H): ICD-10-CM

## 2017-06-22 DIAGNOSIS — G57.93 NEUROPATHY OF BOTH FEET: ICD-10-CM

## 2017-06-22 DIAGNOSIS — G62.0 CHEMOTHERAPY-INDUCED PERIPHERAL NEUROPATHY (H): ICD-10-CM

## 2017-06-27 ENCOUNTER — AMBULATORY - HEALTHEAST (OUTPATIENT)
Dept: ONCOLOGY | Facility: CLINIC | Age: 82
End: 2017-06-27

## 2017-06-27 DIAGNOSIS — G56.92 NEUROPATHY OF HAND, LEFT: ICD-10-CM

## 2017-06-27 DIAGNOSIS — G62.0 CHEMOTHERAPY-INDUCED PERIPHERAL NEUROPATHY (H): ICD-10-CM

## 2017-06-27 DIAGNOSIS — G56.91 NEUROPATHY OF HAND, RIGHT: ICD-10-CM

## 2017-06-27 DIAGNOSIS — T45.1X5A CHEMOTHERAPY-INDUCED PERIPHERAL NEUROPATHY (H): ICD-10-CM

## 2017-06-27 DIAGNOSIS — G57.93 NEUROPATHY OF BOTH FEET: ICD-10-CM

## 2017-06-29 ENCOUNTER — AMBULATORY - HEALTHEAST (OUTPATIENT)
Dept: ONCOLOGY | Facility: CLINIC | Age: 82
End: 2017-06-29

## 2017-06-29 DIAGNOSIS — G56.91 NEUROPATHY OF HAND, RIGHT: ICD-10-CM

## 2017-06-29 DIAGNOSIS — G56.92 NEUROPATHY OF HAND, LEFT: ICD-10-CM

## 2017-06-29 DIAGNOSIS — G57.93 NEUROPATHY OF BOTH FEET: ICD-10-CM

## 2017-06-29 DIAGNOSIS — G62.0 CHEMOTHERAPY-INDUCED PERIPHERAL NEUROPATHY (H): ICD-10-CM

## 2017-06-29 DIAGNOSIS — T45.1X5A CHEMOTHERAPY-INDUCED PERIPHERAL NEUROPATHY (H): ICD-10-CM

## 2017-07-06 ENCOUNTER — AMBULATORY - HEALTHEAST (OUTPATIENT)
Dept: INFUSION THERAPY | Facility: HOSPITAL | Age: 82
End: 2017-07-06

## 2017-07-06 ENCOUNTER — INFUSION - HEALTHEAST (OUTPATIENT)
Dept: INFUSION THERAPY | Facility: HOSPITAL | Age: 82
End: 2017-07-06

## 2017-07-06 ENCOUNTER — OFFICE VISIT - HEALTHEAST (OUTPATIENT)
Dept: ONCOLOGY | Facility: HOSPITAL | Age: 82
End: 2017-07-06

## 2017-07-06 DIAGNOSIS — C34.11 MALIGNANT NEOPLASM OF UPPER LOBE BRONCHUS, RIGHT (H): ICD-10-CM

## 2017-07-06 DIAGNOSIS — D64.81 ANTINEOPLASTIC CHEMOTHERAPY INDUCED ANEMIA: ICD-10-CM

## 2017-07-06 DIAGNOSIS — T45.1X5A ANTINEOPLASTIC CHEMOTHERAPY INDUCED ANEMIA: ICD-10-CM

## 2017-07-06 ASSESSMENT — MIFFLIN-ST. JEOR: SCORE: 889.03

## 2017-07-10 ENCOUNTER — AMBULATORY - HEALTHEAST (OUTPATIENT)
Dept: PODIATRY | Facility: CLINIC | Age: 82
End: 2017-07-10

## 2017-07-10 DIAGNOSIS — M79.673 PAIN OF FOOT, UNSPECIFIED LATERALITY: ICD-10-CM

## 2017-07-10 DIAGNOSIS — L60.2 ONYCHAUXIS: ICD-10-CM

## 2017-07-10 DIAGNOSIS — G62.0 DRUG-INDUCED POLYNEUROPATHY (H): ICD-10-CM

## 2017-07-11 ENCOUNTER — COMMUNICATION - HEALTHEAST (OUTPATIENT)
Dept: ONCOLOGY | Facility: HOSPITAL | Age: 82
End: 2017-07-11

## 2017-07-18 ENCOUNTER — AMBULATORY - HEALTHEAST (OUTPATIENT)
Dept: ONCOLOGY | Facility: CLINIC | Age: 82
End: 2017-07-18

## 2017-07-18 DIAGNOSIS — G56.92 NEUROPATHY OF HAND, LEFT: ICD-10-CM

## 2017-07-18 DIAGNOSIS — T45.1X5A CHEMOTHERAPY-INDUCED PERIPHERAL NEUROPATHY (H): ICD-10-CM

## 2017-07-18 DIAGNOSIS — G56.91 NEUROPATHY OF HAND, RIGHT: ICD-10-CM

## 2017-07-18 DIAGNOSIS — G57.93 NEUROPATHY OF BOTH FEET: ICD-10-CM

## 2017-07-18 DIAGNOSIS — G62.0 CHEMOTHERAPY-INDUCED PERIPHERAL NEUROPATHY (H): ICD-10-CM

## 2017-07-20 ENCOUNTER — COMMUNICATION - HEALTHEAST (OUTPATIENT)
Dept: INTERNAL MEDICINE | Facility: CLINIC | Age: 82
End: 2017-07-20

## 2017-07-20 DIAGNOSIS — K29.70 GASTRITIS: ICD-10-CM

## 2017-07-25 ENCOUNTER — AMBULATORY - HEALTHEAST (OUTPATIENT)
Dept: ONCOLOGY | Facility: CLINIC | Age: 82
End: 2017-07-25

## 2017-07-25 DIAGNOSIS — G56.91 NEUROPATHY OF HAND, RIGHT: ICD-10-CM

## 2017-07-25 DIAGNOSIS — G56.92 NEUROPATHY OF HAND, LEFT: ICD-10-CM

## 2017-07-25 DIAGNOSIS — G57.93 NEUROPATHY OF BOTH FEET: ICD-10-CM

## 2017-07-27 ENCOUNTER — AMBULATORY - HEALTHEAST (OUTPATIENT)
Dept: INFUSION THERAPY | Facility: HOSPITAL | Age: 82
End: 2017-07-27

## 2017-07-27 ENCOUNTER — OFFICE VISIT - HEALTHEAST (OUTPATIENT)
Dept: ONCOLOGY | Facility: HOSPITAL | Age: 82
End: 2017-07-27

## 2017-07-27 ENCOUNTER — INFUSION - HEALTHEAST (OUTPATIENT)
Dept: INFUSION THERAPY | Facility: HOSPITAL | Age: 82
End: 2017-07-27

## 2017-07-27 DIAGNOSIS — T45.1X5A ANTINEOPLASTIC CHEMOTHERAPY INDUCED ANEMIA: ICD-10-CM

## 2017-07-27 DIAGNOSIS — G62.0 CHEMOTHERAPY-INDUCED PERIPHERAL NEUROPATHY (H): ICD-10-CM

## 2017-07-27 DIAGNOSIS — I10 ESSENTIAL HYPERTENSION WITH GOAL BLOOD PRESSURE LESS THAN 140/90: ICD-10-CM

## 2017-07-27 DIAGNOSIS — T45.1X5A CHEMOTHERAPY-INDUCED PERIPHERAL NEUROPATHY (H): ICD-10-CM

## 2017-07-27 DIAGNOSIS — D64.81 ANTINEOPLASTIC CHEMOTHERAPY INDUCED ANEMIA: ICD-10-CM

## 2017-07-27 DIAGNOSIS — C34.11 MALIGNANT NEOPLASM OF UPPER LOBE BRONCHUS, RIGHT (H): ICD-10-CM

## 2017-07-27 DIAGNOSIS — N18.9 CHRONIC KIDNEY DISEASE, UNSPECIFIED: ICD-10-CM

## 2017-07-31 ENCOUNTER — OFFICE VISIT - HEALTHEAST (OUTPATIENT)
Dept: INTERNAL MEDICINE | Facility: CLINIC | Age: 82
End: 2017-07-31

## 2017-07-31 ENCOUNTER — COMMUNICATION - HEALTHEAST (OUTPATIENT)
Dept: INTERNAL MEDICINE | Facility: CLINIC | Age: 82
End: 2017-07-31

## 2017-07-31 ENCOUNTER — AMBULATORY - HEALTHEAST (OUTPATIENT)
Dept: ONCOLOGY | Facility: CLINIC | Age: 82
End: 2017-07-31

## 2017-07-31 ENCOUNTER — RECORDS - HEALTHEAST (OUTPATIENT)
Dept: ADMINISTRATIVE | Facility: OTHER | Age: 82
End: 2017-07-31

## 2017-07-31 DIAGNOSIS — G56.91 NEUROPATHY OF HAND, RIGHT: ICD-10-CM

## 2017-07-31 DIAGNOSIS — G57.93 NEUROPATHY OF BOTH FEET: ICD-10-CM

## 2017-07-31 DIAGNOSIS — G56.92 NEUROPATHY OF HAND, LEFT: ICD-10-CM

## 2017-07-31 DIAGNOSIS — E11.9 TYPE 2 DIABETES MELLITUS (H): ICD-10-CM

## 2017-07-31 LAB — HBA1C MFR BLD: 6.1 % (ref 3.5–6)

## 2017-07-31 ASSESSMENT — MIFFLIN-ST. JEOR: SCORE: 904.45

## 2017-08-01 ENCOUNTER — COMMUNICATION - HEALTHEAST (OUTPATIENT)
Dept: ONCOLOGY | Facility: HOSPITAL | Age: 82
End: 2017-08-01

## 2017-08-01 ENCOUNTER — RECORDS - HEALTHEAST (OUTPATIENT)
Dept: ADMINISTRATIVE | Facility: OTHER | Age: 82
End: 2017-08-01

## 2017-08-07 ENCOUNTER — AMBULATORY - HEALTHEAST (OUTPATIENT)
Dept: ONCOLOGY | Facility: CLINIC | Age: 82
End: 2017-08-07

## 2017-08-07 ENCOUNTER — COMMUNICATION - HEALTHEAST (OUTPATIENT)
Dept: INTERNAL MEDICINE | Facility: CLINIC | Age: 82
End: 2017-08-07

## 2017-08-07 DIAGNOSIS — G56.92 NEUROPATHY OF HAND, LEFT: ICD-10-CM

## 2017-08-07 DIAGNOSIS — G57.93 NEUROPATHY OF BOTH FEET: ICD-10-CM

## 2017-08-07 DIAGNOSIS — T45.1X5A NEUROPATHY DUE TO CHEMOTHERAPEUTIC DRUG (H): ICD-10-CM

## 2017-08-07 DIAGNOSIS — G62.0 NEUROPATHY DUE TO CHEMOTHERAPEUTIC DRUG (H): ICD-10-CM

## 2017-08-07 DIAGNOSIS — T45.1X5A CHEMOTHERAPY-INDUCED PERIPHERAL NEUROPATHY (H): ICD-10-CM

## 2017-08-07 DIAGNOSIS — G56.91 NEUROPATHY OF HAND, RIGHT: ICD-10-CM

## 2017-08-07 DIAGNOSIS — G62.0 CHEMOTHERAPY-INDUCED PERIPHERAL NEUROPATHY (H): ICD-10-CM

## 2017-08-14 ENCOUNTER — HOSPITAL ENCOUNTER (OUTPATIENT)
Dept: PET IMAGING | Facility: HOSPITAL | Age: 82
Discharge: HOME | End: 2017-08-14

## 2017-08-14 DIAGNOSIS — C34.11 MALIGNANT NEOPLASM OF UPPER LOBE BRONCHUS, RIGHT (H): ICD-10-CM

## 2017-08-15 ENCOUNTER — HOSPITAL ENCOUNTER (OUTPATIENT)
Dept: PET IMAGING | Facility: HOSPITAL | Age: 82
Setting detail: RADIATION/ONCOLOGY SERIES
Discharge: STILL ADMITTED OR ADMINISTRATIVE | End: 2017-08-15
Attending: INTERNAL MEDICINE

## 2017-08-15 ENCOUNTER — INFUSION - HEALTHEAST (OUTPATIENT)
Dept: INFUSION THERAPY | Facility: HOSPITAL | Age: 82
End: 2017-08-15

## 2017-08-15 ENCOUNTER — AMBULATORY - HEALTHEAST (OUTPATIENT)
Dept: INFUSION THERAPY | Facility: HOSPITAL | Age: 82
End: 2017-08-15

## 2017-08-15 ENCOUNTER — OFFICE VISIT - HEALTHEAST (OUTPATIENT)
Dept: ONCOLOGY | Facility: HOSPITAL | Age: 82
End: 2017-08-15

## 2017-08-15 DIAGNOSIS — C34.11 MALIGNANT NEOPLASM OF UPPER LOBE BRONCHUS, RIGHT (H): ICD-10-CM

## 2017-08-21 ENCOUNTER — AMBULATORY - HEALTHEAST (OUTPATIENT)
Dept: ONCOLOGY | Facility: CLINIC | Age: 82
End: 2017-08-21

## 2017-08-21 DIAGNOSIS — T45.1X5A CHEMOTHERAPY-INDUCED PERIPHERAL NEUROPATHY (H): ICD-10-CM

## 2017-08-21 DIAGNOSIS — G56.92 NEUROPATHY OF HAND, LEFT: ICD-10-CM

## 2017-08-21 DIAGNOSIS — G56.91 NEUROPATHY OF HAND, RIGHT: ICD-10-CM

## 2017-08-21 DIAGNOSIS — G62.0 CHEMOTHERAPY-INDUCED PERIPHERAL NEUROPATHY (H): ICD-10-CM

## 2017-08-21 DIAGNOSIS — G57.93 NEUROPATHY OF BOTH FEET: ICD-10-CM

## 2017-08-29 ENCOUNTER — AMBULATORY - HEALTHEAST (OUTPATIENT)
Dept: ONCOLOGY | Facility: CLINIC | Age: 82
End: 2017-08-29

## 2017-08-29 DIAGNOSIS — G56.92 NEUROPATHY OF HAND, LEFT: ICD-10-CM

## 2017-08-29 DIAGNOSIS — T45.1X5A CHEMOTHERAPY-INDUCED PERIPHERAL NEUROPATHY (H): ICD-10-CM

## 2017-08-29 DIAGNOSIS — G62.0 CHEMOTHERAPY-INDUCED PERIPHERAL NEUROPATHY (H): ICD-10-CM

## 2017-08-29 DIAGNOSIS — G56.91 NEUROPATHY OF HAND, RIGHT: ICD-10-CM

## 2017-08-29 DIAGNOSIS — G57.93 NEUROPATHY OF BOTH FEET: ICD-10-CM

## 2017-09-07 ENCOUNTER — INFUSION - HEALTHEAST (OUTPATIENT)
Dept: INFUSION THERAPY | Facility: HOSPITAL | Age: 82
End: 2017-09-07

## 2017-09-07 ENCOUNTER — OFFICE VISIT - HEALTHEAST (OUTPATIENT)
Dept: ONCOLOGY | Facility: HOSPITAL | Age: 82
End: 2017-09-07

## 2017-09-07 ENCOUNTER — AMBULATORY - HEALTHEAST (OUTPATIENT)
Dept: INFUSION THERAPY | Facility: HOSPITAL | Age: 82
End: 2017-09-07

## 2017-09-07 DIAGNOSIS — C34.11 MALIGNANT NEOPLASM OF UPPER LOBE BRONCHUS, RIGHT (H): ICD-10-CM

## 2017-09-07 DIAGNOSIS — N18.9 CHRONIC KIDNEY DISEASE, UNSPECIFIED: ICD-10-CM

## 2017-09-07 DIAGNOSIS — E83.52 HYPERCALCEMIA: ICD-10-CM

## 2017-09-07 DIAGNOSIS — Z51.11 ENCOUNTER FOR CHEMOTHERAPY MANAGEMENT: ICD-10-CM

## 2017-09-07 DIAGNOSIS — T45.1X5A CHEMOTHERAPY-INDUCED PERIPHERAL NEUROPATHY (H): ICD-10-CM

## 2017-09-07 DIAGNOSIS — G62.0 CHEMOTHERAPY-INDUCED PERIPHERAL NEUROPATHY (H): ICD-10-CM

## 2017-09-11 ENCOUNTER — AMBULATORY - HEALTHEAST (OUTPATIENT)
Dept: PODIATRY | Facility: CLINIC | Age: 82
End: 2017-09-11

## 2017-09-11 DIAGNOSIS — E11.49 TYPE 2 DIABETES MELLITUS WITH NEUROLOGICAL MANIFESTATIONS (H): ICD-10-CM

## 2017-09-11 DIAGNOSIS — M79.673 PAIN OF FOOT, UNSPECIFIED LATERALITY: ICD-10-CM

## 2017-09-11 DIAGNOSIS — L60.2 ONYCHAUXIS: ICD-10-CM

## 2017-09-11 ASSESSMENT — MIFFLIN-ST. JEOR: SCORE: 894.02

## 2017-09-22 ENCOUNTER — COMMUNICATION - HEALTHEAST (OUTPATIENT)
Dept: SCHEDULING | Facility: CLINIC | Age: 82
End: 2017-09-22

## 2017-09-22 ENCOUNTER — OFFICE VISIT - HEALTHEAST (OUTPATIENT)
Dept: INTERNAL MEDICINE | Facility: CLINIC | Age: 82
End: 2017-09-22

## 2017-09-22 DIAGNOSIS — Z12.31 VISIT FOR SCREENING MAMMOGRAM: ICD-10-CM

## 2017-09-22 DIAGNOSIS — R73.9 HYPERGLYCEMIA: ICD-10-CM

## 2017-09-22 LAB — HBA1C MFR BLD: 7.8 % (ref 3.5–6)

## 2017-09-22 ASSESSMENT — MIFFLIN-ST. JEOR: SCORE: 889.48

## 2017-09-25 ENCOUNTER — COMMUNICATION - HEALTHEAST (OUTPATIENT)
Dept: INTERNAL MEDICINE | Facility: CLINIC | Age: 82
End: 2017-09-25

## 2017-09-28 ENCOUNTER — RECORDS - HEALTHEAST (OUTPATIENT)
Dept: ADMINISTRATIVE | Facility: OTHER | Age: 82
End: 2017-09-28

## 2017-09-28 ENCOUNTER — AMBULATORY - HEALTHEAST (OUTPATIENT)
Dept: INFUSION THERAPY | Facility: HOSPITAL | Age: 82
End: 2017-09-28

## 2017-09-28 ENCOUNTER — COMMUNICATION - HEALTHEAST (OUTPATIENT)
Dept: ONCOLOGY | Facility: HOSPITAL | Age: 82
End: 2017-09-28

## 2017-09-28 ENCOUNTER — HOSPITAL ENCOUNTER (OUTPATIENT)
Dept: ULTRASOUND IMAGING | Facility: HOSPITAL | Age: 82
Setting detail: RADIATION/ONCOLOGY SERIES
Discharge: STILL ADMITTED OR ADMINISTRATIVE | End: 2017-09-28
Attending: INTERNAL MEDICINE

## 2017-09-28 ENCOUNTER — AMBULATORY - HEALTHEAST (OUTPATIENT)
Dept: ONCOLOGY | Facility: HOSPITAL | Age: 82
End: 2017-09-28

## 2017-09-28 ENCOUNTER — OFFICE VISIT - HEALTHEAST (OUTPATIENT)
Dept: ONCOLOGY | Facility: HOSPITAL | Age: 82
End: 2017-09-28

## 2017-09-28 DIAGNOSIS — C34.11 MALIGNANT NEOPLASM OF UPPER LOBE BRONCHUS, RIGHT (H): ICD-10-CM

## 2017-09-28 DIAGNOSIS — R79.89 ELEVATED LFTS: ICD-10-CM

## 2017-09-29 ENCOUNTER — COMMUNICATION - HEALTHEAST (OUTPATIENT)
Dept: ONCOLOGY | Facility: HOSPITAL | Age: 82
End: 2017-09-29

## 2017-10-02 ENCOUNTER — COMMUNICATION - HEALTHEAST (OUTPATIENT)
Dept: INTERNAL MEDICINE | Facility: CLINIC | Age: 82
End: 2017-10-02

## 2017-10-02 ENCOUNTER — COMMUNICATION - HEALTHEAST (OUTPATIENT)
Dept: ONCOLOGY | Facility: CLINIC | Age: 82
End: 2017-10-02

## 2017-10-05 ENCOUNTER — OFFICE VISIT - HEALTHEAST (OUTPATIENT)
Dept: ONCOLOGY | Facility: HOSPITAL | Age: 82
End: 2017-10-05

## 2017-10-05 ENCOUNTER — AMBULATORY - HEALTHEAST (OUTPATIENT)
Dept: INFUSION THERAPY | Facility: HOSPITAL | Age: 82
End: 2017-10-05

## 2017-10-05 DIAGNOSIS — C34.11 MALIGNANT NEOPLASM OF UPPER LOBE BRONCHUS, RIGHT (H): ICD-10-CM

## 2017-10-05 DIAGNOSIS — K75.9 HEPATITIS: ICD-10-CM

## 2017-10-05 DIAGNOSIS — T50.905A DRUG-INDUCED HEPATITIS: ICD-10-CM

## 2017-10-05 DIAGNOSIS — Z23 FLU VACCINE NEED: ICD-10-CM

## 2017-10-05 DIAGNOSIS — K71.6 DRUG-INDUCED HEPATITIS: ICD-10-CM

## 2017-10-09 ENCOUNTER — COMMUNICATION - HEALTHEAST (OUTPATIENT)
Dept: INTERNAL MEDICINE | Facility: CLINIC | Age: 82
End: 2017-10-09

## 2017-10-16 ENCOUNTER — COMMUNICATION - HEALTHEAST (OUTPATIENT)
Dept: INTERNAL MEDICINE | Facility: CLINIC | Age: 82
End: 2017-10-16

## 2017-10-17 ENCOUNTER — COMMUNICATION - HEALTHEAST (OUTPATIENT)
Dept: INTERNAL MEDICINE | Facility: CLINIC | Age: 82
End: 2017-10-17

## 2017-10-17 DIAGNOSIS — E11.9 DIABETES MELLITUS (H): ICD-10-CM

## 2017-10-19 ENCOUNTER — AMBULATORY - HEALTHEAST (OUTPATIENT)
Dept: INFUSION THERAPY | Facility: HOSPITAL | Age: 82
End: 2017-10-19

## 2017-10-19 ENCOUNTER — AMBULATORY - HEALTHEAST (OUTPATIENT)
Dept: ONCOLOGY | Facility: HOSPITAL | Age: 82
End: 2017-10-19

## 2017-10-19 DIAGNOSIS — K75.9 HEPATITIS: ICD-10-CM

## 2017-10-23 ENCOUNTER — COMMUNICATION - HEALTHEAST (OUTPATIENT)
Dept: INTERNAL MEDICINE | Facility: CLINIC | Age: 82
End: 2017-10-23

## 2017-10-23 DIAGNOSIS — T38.0X5A STEROID-INDUCED HYPERGLYCEMIA: ICD-10-CM

## 2017-10-23 DIAGNOSIS — R73.9 STEROID-INDUCED HYPERGLYCEMIA: ICD-10-CM

## 2017-10-27 ENCOUNTER — COMMUNICATION - HEALTHEAST (OUTPATIENT)
Dept: INTERNAL MEDICINE | Facility: CLINIC | Age: 82
End: 2017-10-27

## 2017-11-02 ENCOUNTER — COMMUNICATION - HEALTHEAST (OUTPATIENT)
Dept: INTERNAL MEDICINE | Facility: CLINIC | Age: 82
End: 2017-11-02

## 2017-11-02 ENCOUNTER — AMBULATORY - HEALTHEAST (OUTPATIENT)
Dept: ONCOLOGY | Facility: HOSPITAL | Age: 82
End: 2017-11-02

## 2017-11-02 ENCOUNTER — AMBULATORY - HEALTHEAST (OUTPATIENT)
Dept: INFUSION THERAPY | Facility: HOSPITAL | Age: 82
End: 2017-11-02

## 2017-11-02 DIAGNOSIS — C34.11 MALIGNANT NEOPLASM OF UPPER LOBE BRONCHUS, RIGHT (H): ICD-10-CM

## 2017-11-02 DIAGNOSIS — K75.9 HEPATITIS: ICD-10-CM

## 2017-11-02 DIAGNOSIS — E78.5 HYPERLIPIDEMIA: ICD-10-CM

## 2017-11-10 ENCOUNTER — HOSPITAL ENCOUNTER (OUTPATIENT)
Dept: PET IMAGING | Facility: HOSPITAL | Age: 82
Setting detail: RADIATION/ONCOLOGY SERIES
Discharge: STILL ADMITTED OR ADMINISTRATIVE | End: 2017-11-10
Attending: INTERNAL MEDICINE

## 2017-11-10 ENCOUNTER — COMMUNICATION - HEALTHEAST (OUTPATIENT)
Dept: INTERNAL MEDICINE | Facility: CLINIC | Age: 82
End: 2017-11-10

## 2017-11-10 ENCOUNTER — AMBULATORY - HEALTHEAST (OUTPATIENT)
Dept: GERIATRICS | Facility: CLINIC | Age: 82
End: 2017-11-10

## 2017-11-10 ENCOUNTER — AMBULATORY - HEALTHEAST (OUTPATIENT)
Dept: INFUSION THERAPY | Facility: HOSPITAL | Age: 82
End: 2017-11-10

## 2017-11-10 DIAGNOSIS — C34.11 MALIGNANT NEOPLASM OF UPPER LOBE BRONCHUS, RIGHT (H): ICD-10-CM

## 2017-11-10 DIAGNOSIS — E78.5 HYPERLIPIDEMIA: ICD-10-CM

## 2017-11-10 DIAGNOSIS — K75.9 HEPATITIS: ICD-10-CM

## 2017-11-13 ENCOUNTER — COMMUNICATION - HEALTHEAST (OUTPATIENT)
Dept: ONCOLOGY | Facility: HOSPITAL | Age: 82
End: 2017-11-13

## 2017-11-13 ENCOUNTER — AMBULATORY - HEALTHEAST (OUTPATIENT)
Dept: PODIATRY | Facility: CLINIC | Age: 82
End: 2017-11-13

## 2017-11-13 DIAGNOSIS — M79.673 PAIN OF FOOT, UNSPECIFIED LATERALITY: ICD-10-CM

## 2017-11-13 DIAGNOSIS — L60.2 ONYCHAUXIS: ICD-10-CM

## 2017-11-13 DIAGNOSIS — E11.49 TYPE 2 DIABETES MELLITUS WITH NEUROLOGICAL MANIFESTATIONS (H): ICD-10-CM

## 2017-11-13 ASSESSMENT — MIFFLIN-ST. JEOR: SCORE: 884.94

## 2017-11-14 ENCOUNTER — COMMUNICATION - HEALTHEAST (OUTPATIENT)
Dept: ONCOLOGY | Facility: HOSPITAL | Age: 82
End: 2017-11-14

## 2017-11-15 ENCOUNTER — AMBULATORY - HEALTHEAST (OUTPATIENT)
Dept: INFUSION THERAPY | Facility: HOSPITAL | Age: 82
End: 2017-11-15

## 2017-11-15 ENCOUNTER — COMMUNICATION - HEALTHEAST (OUTPATIENT)
Dept: INTERNAL MEDICINE | Facility: CLINIC | Age: 82
End: 2017-11-15

## 2017-11-15 ENCOUNTER — OFFICE VISIT - HEALTHEAST (OUTPATIENT)
Dept: ONCOLOGY | Facility: HOSPITAL | Age: 82
End: 2017-11-15

## 2017-11-15 DIAGNOSIS — K75.9 HEPATITIS: ICD-10-CM

## 2017-11-15 DIAGNOSIS — C34.11 MALIGNANT NEOPLASM OF UPPER LOBE BRONCHUS, RIGHT (H): ICD-10-CM

## 2017-11-20 ENCOUNTER — COMMUNICATION - HEALTHEAST (OUTPATIENT)
Dept: ONCOLOGY | Facility: CLINIC | Age: 82
End: 2017-11-20

## 2017-11-20 ENCOUNTER — COMMUNICATION - HEALTHEAST (OUTPATIENT)
Dept: ADMINISTRATIVE | Facility: HOSPITAL | Age: 82
End: 2017-11-20

## 2017-11-21 ENCOUNTER — COMMUNICATION - HEALTHEAST (OUTPATIENT)
Dept: ONCOLOGY | Facility: HOSPITAL | Age: 82
End: 2017-11-21

## 2017-11-24 ENCOUNTER — COMMUNICATION - HEALTHEAST (OUTPATIENT)
Dept: INTERNAL MEDICINE | Facility: CLINIC | Age: 82
End: 2017-11-24

## 2017-11-27 ENCOUNTER — COMMUNICATION - HEALTHEAST (OUTPATIENT)
Dept: ADMINISTRATIVE | Facility: HOSPITAL | Age: 82
End: 2017-11-27

## 2017-11-27 ENCOUNTER — COMMUNICATION - HEALTHEAST (OUTPATIENT)
Dept: INTERNAL MEDICINE | Facility: CLINIC | Age: 82
End: 2017-11-27

## 2017-11-27 DIAGNOSIS — Z87.19 HISTORY OF GI BLEED: ICD-10-CM

## 2017-11-27 DIAGNOSIS — K21.9 ESOPHAGEAL REFLUX: ICD-10-CM

## 2017-11-29 ENCOUNTER — COMMUNICATION - HEALTHEAST (OUTPATIENT)
Dept: ONCOLOGY | Facility: HOSPITAL | Age: 82
End: 2017-11-29

## 2017-11-30 ENCOUNTER — COMMUNICATION - HEALTHEAST (OUTPATIENT)
Dept: ONCOLOGY | Facility: HOSPITAL | Age: 82
End: 2017-11-30

## 2017-12-01 ENCOUNTER — COMMUNICATION - HEALTHEAST (OUTPATIENT)
Dept: ONCOLOGY | Facility: HOSPITAL | Age: 82
End: 2017-12-01

## 2017-12-01 ENCOUNTER — COMMUNICATION - HEALTHEAST (OUTPATIENT)
Dept: INTERNAL MEDICINE | Facility: CLINIC | Age: 82
End: 2017-12-01

## 2017-12-07 ENCOUNTER — OFFICE VISIT - HEALTHEAST (OUTPATIENT)
Dept: INTERNAL MEDICINE | Facility: CLINIC | Age: 82
End: 2017-12-07

## 2017-12-07 ENCOUNTER — HOSPITAL ENCOUNTER (OUTPATIENT)
Dept: ULTRASOUND IMAGING | Facility: HOSPITAL | Age: 82
Discharge: HOME | End: 2017-12-07
Attending: INTERNAL MEDICINE

## 2017-12-07 DIAGNOSIS — I82.409 DEEP VENOUS THROMBOSIS (H): ICD-10-CM

## 2017-12-07 DIAGNOSIS — R60.9 EDEMA: ICD-10-CM

## 2017-12-07 ASSESSMENT — MIFFLIN-ST. JEOR: SCORE: 860.45

## 2017-12-08 ENCOUNTER — COMMUNICATION - HEALTHEAST (OUTPATIENT)
Dept: ONCOLOGY | Facility: HOSPITAL | Age: 82
End: 2017-12-08

## 2017-12-08 ENCOUNTER — COMMUNICATION - HEALTHEAST (OUTPATIENT)
Dept: INTERNAL MEDICINE | Facility: CLINIC | Age: 82
End: 2017-12-08

## 2017-12-08 DIAGNOSIS — T50.905A DRUG-INDUCED HEPATITIS: ICD-10-CM

## 2017-12-08 DIAGNOSIS — K71.6 DRUG-INDUCED HEPATITIS: ICD-10-CM

## 2017-12-09 ENCOUNTER — COMMUNICATION - HEALTHEAST (OUTPATIENT)
Dept: SCHEDULING | Facility: CLINIC | Age: 82
End: 2017-12-09

## 2017-12-12 ENCOUNTER — COMMUNICATION - HEALTHEAST (OUTPATIENT)
Dept: ONCOLOGY | Facility: HOSPITAL | Age: 82
End: 2017-12-12

## 2017-12-15 ENCOUNTER — COMMUNICATION - HEALTHEAST (OUTPATIENT)
Dept: ONCOLOGY | Facility: HOSPITAL | Age: 82
End: 2017-12-15

## 2017-12-18 ENCOUNTER — AMBULATORY - HEALTHEAST (OUTPATIENT)
Dept: INFUSION THERAPY | Facility: HOSPITAL | Age: 82
End: 2017-12-18

## 2017-12-18 ENCOUNTER — OFFICE VISIT - HEALTHEAST (OUTPATIENT)
Dept: ONCOLOGY | Facility: HOSPITAL | Age: 82
End: 2017-12-18

## 2017-12-18 DIAGNOSIS — C34.11 MALIGNANT NEOPLASM OF UPPER LOBE BRONCHUS, RIGHT (H): ICD-10-CM

## 2017-12-20 ENCOUNTER — COMMUNICATION - HEALTHEAST (OUTPATIENT)
Dept: INTERNAL MEDICINE | Facility: CLINIC | Age: 82
End: 2017-12-20

## 2017-12-21 ENCOUNTER — COMMUNICATION - HEALTHEAST (OUTPATIENT)
Dept: INTERNAL MEDICINE | Facility: CLINIC | Age: 82
End: 2017-12-21

## 2017-12-21 ENCOUNTER — COMMUNICATION - HEALTHEAST (OUTPATIENT)
Dept: ONCOLOGY | Facility: HOSPITAL | Age: 82
End: 2017-12-21

## 2017-12-22 ENCOUNTER — RECORDS - HEALTHEAST (OUTPATIENT)
Dept: GENERAL RADIOLOGY | Facility: CLINIC | Age: 82
End: 2017-12-22

## 2017-12-22 ENCOUNTER — OFFICE VISIT - HEALTHEAST (OUTPATIENT)
Dept: INTERNAL MEDICINE | Facility: CLINIC | Age: 82
End: 2017-12-22

## 2017-12-22 ENCOUNTER — COMMUNICATION - HEALTHEAST (OUTPATIENT)
Dept: SCHEDULING | Facility: CLINIC | Age: 82
End: 2017-12-22

## 2017-12-22 DIAGNOSIS — D64.9 ANEMIA, UNSPECIFIED TYPE: ICD-10-CM

## 2017-12-22 DIAGNOSIS — M25.552 HIP PAIN, ACUTE, LEFT: ICD-10-CM

## 2017-12-22 DIAGNOSIS — M25.552 PAIN IN LEFT HIP: ICD-10-CM

## 2017-12-22 ASSESSMENT — MIFFLIN-ST. JEOR: SCORE: 856.82

## 2017-12-27 ENCOUNTER — AMBULATORY - HEALTHEAST (OUTPATIENT)
Dept: LAB | Facility: CLINIC | Age: 82
End: 2017-12-27

## 2017-12-27 DIAGNOSIS — D64.9 ANEMIA, UNSPECIFIED TYPE: ICD-10-CM

## 2017-12-28 ENCOUNTER — COMMUNICATION - HEALTHEAST (OUTPATIENT)
Dept: ONCOLOGY | Facility: HOSPITAL | Age: 82
End: 2017-12-28

## 2017-12-29 ENCOUNTER — COMMUNICATION - HEALTHEAST (OUTPATIENT)
Dept: INTERNAL MEDICINE | Facility: CLINIC | Age: 82
End: 2017-12-29

## 2018-01-01 ENCOUNTER — COMMUNICATION - HEALTHEAST (OUTPATIENT)
Dept: ONCOLOGY | Facility: HOSPITAL | Age: 83
End: 2018-01-01

## 2018-01-01 ENCOUNTER — OFFICE VISIT - HEALTHEAST (OUTPATIENT)
Dept: INTERNAL MEDICINE | Facility: CLINIC | Age: 83
End: 2018-01-01

## 2018-01-01 ENCOUNTER — COMMUNICATION - HEALTHEAST (OUTPATIENT)
Dept: INTERNAL MEDICINE | Facility: CLINIC | Age: 83
End: 2018-01-01

## 2018-01-01 ENCOUNTER — AMBULATORY - HEALTHEAST (OUTPATIENT)
Dept: LAB | Facility: CLINIC | Age: 83
End: 2018-01-01

## 2018-01-01 ENCOUNTER — COMMUNICATION - HEALTHEAST (OUTPATIENT)
Dept: SCHEDULING | Facility: CLINIC | Age: 83
End: 2018-01-01

## 2018-01-01 ENCOUNTER — AMBULATORY - HEALTHEAST (OUTPATIENT)
Dept: INFUSION THERAPY | Facility: HOSPITAL | Age: 83
End: 2018-01-01

## 2018-01-01 ENCOUNTER — OFFICE VISIT - HEALTHEAST (OUTPATIENT)
Dept: ONCOLOGY | Facility: HOSPITAL | Age: 83
End: 2018-01-01

## 2018-01-01 ENCOUNTER — AMBULATORY - HEALTHEAST (OUTPATIENT)
Dept: ONCOLOGY | Facility: HOSPITAL | Age: 83
End: 2018-01-01

## 2018-01-01 ENCOUNTER — HOSPITAL ENCOUNTER (OUTPATIENT)
Dept: CT IMAGING | Facility: HOSPITAL | Age: 83
Setting detail: RADIATION/ONCOLOGY SERIES
Discharge: STILL ADMITTED OR ADMINISTRATIVE | End: 2018-11-12
Attending: INTERNAL MEDICINE

## 2018-01-01 ENCOUNTER — COMMUNICATION - HEALTHEAST (OUTPATIENT)
Dept: ONCOLOGY | Facility: CLINIC | Age: 83
End: 2018-01-01

## 2018-01-01 ENCOUNTER — AMBULATORY - HEALTHEAST (OUTPATIENT)
Dept: INTERNAL MEDICINE | Facility: CLINIC | Age: 83
End: 2018-01-01

## 2018-01-01 DIAGNOSIS — I48.0 PAROXYSMAL ATRIAL FIBRILLATION WITH RAPID VENTRICULAR RESPONSE (H): ICD-10-CM

## 2018-01-01 DIAGNOSIS — R94.39 ABNORMAL NUCLEAR STRESS TEST: ICD-10-CM

## 2018-01-01 DIAGNOSIS — C34.11 MALIGNANT NEOPLASM OF UPPER LOBE BRONCHUS, RIGHT (H): ICD-10-CM

## 2018-01-01 DIAGNOSIS — Z51.81 MONITORING FOR ANTICOAGULANT USE: ICD-10-CM

## 2018-01-01 DIAGNOSIS — R30.0 DYSURIA: ICD-10-CM

## 2018-01-01 DIAGNOSIS — I82.409 DVT (DEEP VENOUS THROMBOSIS) (H): ICD-10-CM

## 2018-01-01 DIAGNOSIS — K75.9 HEPATITIS: ICD-10-CM

## 2018-01-01 DIAGNOSIS — Z23 FLU VACCINE NEED: ICD-10-CM

## 2018-01-01 DIAGNOSIS — E11.9 DIABETES MELLITUS, TYPE 2 (H): ICD-10-CM

## 2018-01-01 DIAGNOSIS — E11.9 TYPE 2 DIABETES MELLITUS (H): ICD-10-CM

## 2018-01-01 DIAGNOSIS — Z79.01 ENCOUNTER FOR MONITORING COUMADIN THERAPY: ICD-10-CM

## 2018-01-01 DIAGNOSIS — E11.9 DIABETES MELLITUS (H): ICD-10-CM

## 2018-01-01 DIAGNOSIS — R00.0 TACHYCARDIA: ICD-10-CM

## 2018-01-01 DIAGNOSIS — Z79.01 MONITORING FOR ANTICOAGULANT USE: ICD-10-CM

## 2018-01-01 DIAGNOSIS — Z51.81 ENCOUNTER FOR MONITORING COUMADIN THERAPY: ICD-10-CM

## 2018-01-01 DIAGNOSIS — I10 ESSENTIAL HYPERTENSION WITH GOAL BLOOD PRESSURE LESS THAN 140/90: ICD-10-CM

## 2018-01-01 DIAGNOSIS — C34.90 LUNG CANCER (H): ICD-10-CM

## 2018-01-01 DIAGNOSIS — N30.00 ACUTE CYSTITIS WITHOUT HEMATURIA: ICD-10-CM

## 2018-01-01 DIAGNOSIS — I10 BENIGN ESSENTIAL HYPERTENSION: ICD-10-CM

## 2018-01-01 LAB
ALBUMIN SERPL-MCNC: 3.2 G/DL (ref 3.5–5)
ALBUMIN SERPL-MCNC: 3.3 G/DL (ref 3.5–5)
ALBUMIN SERPL-MCNC: 3.5 G/DL (ref 3.5–5)
ALBUMIN UR-MCNC: ABNORMAL MG/DL
ALP SERPL-CCNC: 205 U/L (ref 45–120)
ALP SERPL-CCNC: 85 U/L (ref 45–120)
ALP SERPL-CCNC: 96 U/L (ref 45–120)
ALT SERPL W P-5'-P-CCNC: 18 U/L (ref 0–45)
ALT SERPL W P-5'-P-CCNC: 20 U/L (ref 0–45)
ALT SERPL W P-5'-P-CCNC: 23 U/L (ref 0–45)
ANION GAP SERPL CALCULATED.3IONS-SCNC: 7 MMOL/L (ref 5–18)
ANION GAP SERPL CALCULATED.3IONS-SCNC: 8 MMOL/L (ref 5–18)
ANION GAP SERPL CALCULATED.3IONS-SCNC: 8 MMOL/L (ref 5–18)
APPEARANCE UR: ABNORMAL
AST SERPL W P-5'-P-CCNC: 28 U/L (ref 0–40)
AST SERPL W P-5'-P-CCNC: 32 U/L (ref 0–40)
AST SERPL W P-5'-P-CCNC: 33 U/L (ref 0–40)
ATRIAL RATE - MUSE: 99 BPM
BACTERIA #/AREA URNS HPF: ABNORMAL HPF
BACTERIA SPEC CULT: ABNORMAL
BASOPHILS # BLD AUTO: 0 THOU/UL (ref 0–0.2)
BASOPHILS # BLD AUTO: 0.1 THOU/UL (ref 0–0.2)
BASOPHILS NFR BLD AUTO: 1 % (ref 0–2)
BASOPHILS NFR BLD AUTO: 1 % (ref 0–2)
BILIRUB SERPL-MCNC: 0.3 MG/DL (ref 0–1)
BILIRUB UR QL STRIP: NEGATIVE
BUN SERPL-MCNC: 35 MG/DL (ref 8–28)
BUN SERPL-MCNC: 35 MG/DL (ref 8–28)
BUN SERPL-MCNC: 36 MG/DL (ref 8–28)
BUN SERPL-MCNC: 37 MG/DL (ref 8–28)
BUN SERPL-MCNC: 41 MG/DL (ref 8–28)
CALCIUM SERPL-MCNC: 10 MG/DL (ref 8.5–10.5)
CALCIUM SERPL-MCNC: 10.4 MG/DL (ref 8.5–10.5)
CALCIUM SERPL-MCNC: 10.5 MG/DL (ref 8.5–10.5)
CALCIUM SERPL-MCNC: 9.7 MG/DL (ref 8.5–10.5)
CALCIUM SERPL-MCNC: 9.7 MG/DL (ref 8.5–10.5)
CHLORIDE BLD-SCNC: 100 MMOL/L (ref 98–107)
CHLORIDE BLD-SCNC: 103 MMOL/L (ref 98–107)
CHLORIDE BLD-SCNC: 105 MMOL/L (ref 98–107)
CHLORIDE BLD-SCNC: 106 MMOL/L (ref 98–107)
CHLORIDE BLD-SCNC: 107 MMOL/L (ref 98–107)
CO2 SERPL-SCNC: 29 MMOL/L (ref 22–31)
CO2 SERPL-SCNC: 30 MMOL/L (ref 22–31)
CO2 SERPL-SCNC: 31 MMOL/L (ref 22–31)
COLOR UR AUTO: YELLOW
CREAT SERPL-MCNC: 1.27 MG/DL (ref 0.6–1.1)
CREAT SERPL-MCNC: 1.35 MG/DL (ref 0.6–1.1)
CREAT SERPL-MCNC: 1.43 MG/DL (ref 0.6–1.1)
CREAT SERPL-MCNC: 1.48 MG/DL (ref 0.6–1.1)
CREAT SERPL-MCNC: 1.69 MG/DL (ref 0.6–1.1)
DIASTOLIC BLOOD PRESSURE - MUSE: NORMAL MMHG
EOSINOPHIL # BLD AUTO: 0.4 THOU/UL (ref 0–0.4)
EOSINOPHIL # BLD AUTO: 0.4 THOU/UL (ref 0–0.4)
EOSINOPHIL NFR BLD AUTO: 5 % (ref 0–6)
EOSINOPHIL NFR BLD AUTO: 5 % (ref 0–6)
ERYTHROCYTE [DISTWIDTH] IN BLOOD BY AUTOMATED COUNT: 14 % (ref 11–14.5)
ERYTHROCYTE [DISTWIDTH] IN BLOOD BY AUTOMATED COUNT: 16.1 % (ref 11–14.5)
ERYTHROCYTE [DISTWIDTH] IN BLOOD BY AUTOMATED COUNT: 17.2 % (ref 11–14.5)
GFR SERPL CREATININE-BSD FRML MDRD: 29 ML/MIN/1.73M2
GFR SERPL CREATININE-BSD FRML MDRD: 34 ML/MIN/1.73M2
GFR SERPL CREATININE-BSD FRML MDRD: 35 ML/MIN/1.73M2
GFR SERPL CREATININE-BSD FRML MDRD: 37 ML/MIN/1.73M2
GFR SERPL CREATININE-BSD FRML MDRD: 40 ML/MIN/1.73M2
GLUCOSE BLD-MCNC: 169 MG/DL (ref 70–125)
GLUCOSE BLD-MCNC: 241 MG/DL (ref 70–125)
GLUCOSE BLD-MCNC: 275 MG/DL (ref 70–125)
GLUCOSE BLD-MCNC: 340 MG/DL (ref 70–125)
GLUCOSE BLD-MCNC: 99 MG/DL (ref 70–125)
GLUCOSE UR STRIP-MCNC: ABNORMAL MG/DL
HBA1C MFR BLD: 8.7 % (ref 3.5–6)
HCT VFR BLD AUTO: 35 % (ref 35–47)
HCT VFR BLD AUTO: 35.1 % (ref 35–47)
HCT VFR BLD AUTO: 36.5 % (ref 35–47)
HGB BLD-MCNC: 10.9 G/DL (ref 12–16)
HGB BLD-MCNC: 11.4 G/DL (ref 12–16)
HGB BLD-MCNC: 11.5 G/DL (ref 12–16)
HGB UR QL STRIP: ABNORMAL
INR PPP: 1.36 (ref 0.9–1.1)
INR PPP: 1.46 (ref 0.9–1.1)
INR PPP: 1.64 (ref 0.9–1.1)
INR PPP: 1.8 (ref 0.9–1.1)
INR PPP: 2.03 (ref 0.9–1.1)
INR PPP: 2.05 (ref 0.9–1.1)
INR PPP: 2.13 (ref 0.9–1.1)
INR PPP: 2.46 (ref 0.9–1.1)
INR PPP: 2.57 (ref 0.9–1.1)
INTERPRETATION ECG - MUSE: NORMAL
KETONES UR STRIP-MCNC: NEGATIVE MG/DL
LEUKOCYTE ESTERASE UR QL STRIP: ABNORMAL
LYMPHOCYTES # BLD AUTO: 1.2 THOU/UL (ref 0.8–4.4)
LYMPHOCYTES # BLD AUTO: 1.4 THOU/UL (ref 0.8–4.4)
LYMPHOCYTES NFR BLD AUTO: 13 % (ref 20–40)
LYMPHOCYTES NFR BLD AUTO: 16 % (ref 20–40)
MCH RBC QN AUTO: 27.2 PG (ref 27–34)
MCH RBC QN AUTO: 28 PG (ref 27–34)
MCH RBC QN AUTO: 28.4 PG (ref 27–34)
MCHC RBC AUTO-ENTMCNC: 31.1 G/DL (ref 32–36)
MCHC RBC AUTO-ENTMCNC: 31.5 G/DL (ref 32–36)
MCHC RBC AUTO-ENTMCNC: 32.5 G/DL (ref 32–36)
MCV RBC AUTO: 83 FL (ref 80–100)
MCV RBC AUTO: 89 FL (ref 80–100)
MCV RBC AUTO: 91 FL (ref 80–100)
MONOCYTES # BLD AUTO: 0.6 THOU/UL (ref 0–0.9)
MONOCYTES # BLD AUTO: 0.8 THOU/UL (ref 0–0.9)
MONOCYTES NFR BLD AUTO: 7 % (ref 2–10)
MONOCYTES NFR BLD AUTO: 9 % (ref 2–10)
NEUTROPHILS # BLD AUTO: 5.9 THOU/UL (ref 2–7.7)
NEUTROPHILS # BLD AUTO: 6.4 THOU/UL (ref 2–7.7)
NEUTROPHILS NFR BLD AUTO: 71 % (ref 50–70)
NEUTROPHILS NFR BLD AUTO: 73 % (ref 50–70)
NITRATE UR QL: POSITIVE
P AXIS - MUSE: 66 DEGREES
PH UR STRIP: 7 [PH] (ref 5–8)
PLATELET # BLD AUTO: 211 THOU/UL (ref 140–440)
PLATELET # BLD AUTO: 230 THOU/UL (ref 140–440)
PLATELET # BLD AUTO: 292 THOU/UL (ref 140–440)
PMV BLD AUTO: 10 FL (ref 8.5–12.5)
PMV BLD AUTO: 10.5 FL (ref 8.5–12.5)
PMV BLD AUTO: 8.2 FL (ref 7–10)
POTASSIUM BLD-SCNC: 4 MMOL/L (ref 3.5–5)
POTASSIUM BLD-SCNC: 4.3 MMOL/L (ref 3.5–5)
POTASSIUM BLD-SCNC: 4.5 MMOL/L (ref 3.5–5)
POTASSIUM BLD-SCNC: 4.6 MMOL/L (ref 3.5–5)
POTASSIUM BLD-SCNC: 4.8 MMOL/L (ref 3.5–5)
PR INTERVAL - MUSE: 208 MS
PROT SERPL-MCNC: 6 G/DL (ref 6–8)
PROT SERPL-MCNC: 6.4 G/DL (ref 6–8)
PROT SERPL-MCNC: 6.9 G/DL (ref 6–8)
QRS DURATION - MUSE: 78 MS
QT - MUSE: 352 MS
QTC - MUSE: 451 MS
R AXIS - MUSE: -16 DEGREES
RBC # BLD AUTO: 3.84 MILL/UL (ref 3.8–5.4)
RBC # BLD AUTO: 4.1 MILL/UL (ref 3.8–5.4)
RBC # BLD AUTO: 4.21 MILL/UL (ref 3.8–5.4)
RBC #/AREA URNS AUTO: ABNORMAL HPF
SODIUM SERPL-SCNC: 137 MMOL/L (ref 136–145)
SODIUM SERPL-SCNC: 140 MMOL/L (ref 136–145)
SODIUM SERPL-SCNC: 143 MMOL/L (ref 136–145)
SP GR UR STRIP: 1.01 (ref 1–1.03)
SQUAMOUS #/AREA URNS AUTO: ABNORMAL LPF
SYSTOLIC BLOOD PRESSURE - MUSE: NORMAL MMHG
T AXIS - MUSE: 49 DEGREES
TSH SERPL DL<=0.005 MIU/L-ACNC: 1.91 UIU/ML (ref 0.3–5)
UROBILINOGEN UR STRIP-ACNC: ABNORMAL
VENTRICULAR RATE- MUSE: 99 BPM
WBC #/AREA URNS AUTO: ABNORMAL HPF
WBC CLUMPS #/AREA URNS HPF: PRESENT /[HPF]
WBC: 6.6 THOU/UL (ref 4–11)
WBC: 8.4 THOU/UL (ref 4–11)
WBC: 8.9 THOU/UL (ref 4–11)

## 2018-01-04 ENCOUNTER — COMMUNICATION - HEALTHEAST (OUTPATIENT)
Dept: INTERNAL MEDICINE | Facility: CLINIC | Age: 83
End: 2018-01-04

## 2018-01-05 ENCOUNTER — COMMUNICATION - HEALTHEAST (OUTPATIENT)
Dept: INTERNAL MEDICINE | Facility: CLINIC | Age: 83
End: 2018-01-05

## 2018-01-05 DIAGNOSIS — E11.9 DIABETES (H): ICD-10-CM

## 2018-01-08 ENCOUNTER — AMBULATORY - HEALTHEAST (OUTPATIENT)
Dept: PODIATRY | Facility: CLINIC | Age: 83
End: 2018-01-08

## 2018-01-08 DIAGNOSIS — E11.49 TYPE 2 DIABETES MELLITUS WITH NEUROLOGICAL MANIFESTATIONS (H): ICD-10-CM

## 2018-01-08 DIAGNOSIS — M79.673 PAIN OF FOOT, UNSPECIFIED LATERALITY: ICD-10-CM

## 2018-01-08 DIAGNOSIS — L60.2 ONYCHAUXIS: ICD-10-CM

## 2018-01-08 ASSESSMENT — MIFFLIN-ST. JEOR: SCORE: 855.46

## 2018-01-10 ENCOUNTER — COMMUNICATION - HEALTHEAST (OUTPATIENT)
Dept: ONCOLOGY | Facility: HOSPITAL | Age: 83
End: 2018-01-10

## 2018-01-10 DIAGNOSIS — I82.409 DEEP VENOUS THROMBOSIS (H): ICD-10-CM

## 2018-01-11 ENCOUNTER — COMMUNICATION - HEALTHEAST (OUTPATIENT)
Dept: INTERNAL MEDICINE | Facility: CLINIC | Age: 83
End: 2018-01-11

## 2018-01-12 ENCOUNTER — COMMUNICATION - HEALTHEAST (OUTPATIENT)
Dept: INTERNAL MEDICINE | Facility: CLINIC | Age: 83
End: 2018-01-12

## 2018-01-24 ENCOUNTER — COMMUNICATION - HEALTHEAST (OUTPATIENT)
Dept: INTERNAL MEDICINE | Facility: CLINIC | Age: 83
End: 2018-01-24

## 2018-01-24 DIAGNOSIS — E11.9 DIABETES MELLITUS (H): ICD-10-CM

## 2018-01-29 ENCOUNTER — HOSPITAL ENCOUNTER (OUTPATIENT)
Dept: PET IMAGING | Facility: HOSPITAL | Age: 83
Setting detail: RADIATION/ONCOLOGY SERIES
Discharge: STILL ADMITTED OR ADMINISTRATIVE | End: 2018-01-29
Attending: INTERNAL MEDICINE

## 2018-01-29 DIAGNOSIS — C34.11 MALIGNANT NEOPLASM OF UPPER LOBE BRONCHUS, RIGHT (H): ICD-10-CM

## 2018-01-29 LAB — GLUCOSE BLDC GLUCOMTR-MCNC: 106 MG/DL

## 2018-01-29 ASSESSMENT — MIFFLIN-ST. JEOR: SCORE: 832.78

## 2018-01-31 ENCOUNTER — OFFICE VISIT - HEALTHEAST (OUTPATIENT)
Dept: ONCOLOGY | Facility: HOSPITAL | Age: 83
End: 2018-01-31

## 2018-01-31 ENCOUNTER — AMBULATORY - HEALTHEAST (OUTPATIENT)
Dept: ONCOLOGY | Facility: HOSPITAL | Age: 83
End: 2018-01-31

## 2018-01-31 ENCOUNTER — AMBULATORY - HEALTHEAST (OUTPATIENT)
Dept: INFUSION THERAPY | Facility: HOSPITAL | Age: 83
End: 2018-01-31

## 2018-01-31 DIAGNOSIS — I48.0 PAROXYSMAL ATRIAL FIBRILLATION WITH RAPID VENTRICULAR RESPONSE (H): ICD-10-CM

## 2018-01-31 DIAGNOSIS — C34.11 MALIGNANT NEOPLASM OF UPPER LOBE BRONCHUS, RIGHT (H): ICD-10-CM

## 2018-01-31 DIAGNOSIS — Z51.81 ENCOUNTER FOR MONITORING COUMADIN THERAPY: ICD-10-CM

## 2018-01-31 DIAGNOSIS — Z79.01 ENCOUNTER FOR MONITORING COUMADIN THERAPY: ICD-10-CM

## 2018-01-31 LAB
ALBUMIN SERPL-MCNC: 3.5 G/DL (ref 3.5–5)
ALP SERPL-CCNC: 85 U/L (ref 45–120)
ALT SERPL W P-5'-P-CCNC: 27 U/L (ref 0–45)
ANION GAP SERPL CALCULATED.3IONS-SCNC: 9 MMOL/L (ref 5–18)
AST SERPL W P-5'-P-CCNC: 20 U/L (ref 0–40)
BASOPHILS # BLD AUTO: 0 THOU/UL (ref 0–0.2)
BASOPHILS NFR BLD AUTO: 1 % (ref 0–2)
BILIRUB SERPL-MCNC: 0.3 MG/DL (ref 0–1)
BUN SERPL-MCNC: 27 MG/DL (ref 8–28)
CALCIUM SERPL-MCNC: 10.2 MG/DL (ref 8.5–10.5)
CHLORIDE BLD-SCNC: 104 MMOL/L (ref 98–107)
CO2 SERPL-SCNC: 29 MMOL/L (ref 22–31)
CREAT SERPL-MCNC: 1.64 MG/DL (ref 0.6–1.1)
EOSINOPHIL # BLD AUTO: 0.3 THOU/UL (ref 0–0.4)
EOSINOPHIL NFR BLD AUTO: 3 % (ref 0–6)
ERYTHROCYTE [DISTWIDTH] IN BLOOD BY AUTOMATED COUNT: 14.6 % (ref 11–14.5)
GFR SERPL CREATININE-BSD FRML MDRD: 30 ML/MIN/1.73M2
GLUCOSE BLD-MCNC: 111 MG/DL (ref 70–125)
HCT VFR BLD AUTO: 40 % (ref 35–47)
HGB BLD-MCNC: 12.6 G/DL (ref 12–16)
LYMPHOCYTES # BLD AUTO: 1.3 THOU/UL (ref 0.8–4.4)
LYMPHOCYTES NFR BLD AUTO: 17 % (ref 20–40)
MCH RBC QN AUTO: 31.7 PG (ref 27–34)
MCHC RBC AUTO-ENTMCNC: 31.5 G/DL (ref 32–36)
MCV RBC AUTO: 101 FL (ref 80–100)
MONOCYTES # BLD AUTO: 0.9 THOU/UL (ref 0–0.9)
MONOCYTES NFR BLD AUTO: 11 % (ref 2–10)
NEUTROPHILS # BLD AUTO: 5.4 THOU/UL (ref 2–7.7)
NEUTROPHILS NFR BLD AUTO: 68 % (ref 50–70)
PLATELET # BLD AUTO: 162 THOU/UL (ref 140–440)
PMV BLD AUTO: 10.9 FL (ref 8.5–12.5)
POTASSIUM BLD-SCNC: 4 MMOL/L (ref 3.5–5)
PROT SERPL-MCNC: 6.4 G/DL (ref 6–8)
RBC # BLD AUTO: 3.98 MILL/UL (ref 3.8–5.4)
SODIUM SERPL-SCNC: 142 MMOL/L (ref 136–145)
WBC: 7.9 THOU/UL (ref 4–11)

## 2018-02-05 ENCOUNTER — OFFICE VISIT - HEALTHEAST (OUTPATIENT)
Dept: NURSING | Facility: CLINIC | Age: 83
End: 2018-02-05

## 2018-02-05 ENCOUNTER — COMMUNICATION - HEALTHEAST (OUTPATIENT)
Dept: ONCOLOGY | Facility: HOSPITAL | Age: 83
End: 2018-02-05

## 2018-02-05 ENCOUNTER — AMBULATORY - HEALTHEAST (OUTPATIENT)
Dept: INFUSION THERAPY | Facility: HOSPITAL | Age: 83
End: 2018-02-05

## 2018-02-05 ENCOUNTER — AMBULATORY - HEALTHEAST (OUTPATIENT)
Dept: NURSING | Facility: CLINIC | Age: 83
End: 2018-02-05

## 2018-02-05 ENCOUNTER — OFFICE VISIT - HEALTHEAST (OUTPATIENT)
Dept: INTERNAL MEDICINE | Facility: CLINIC | Age: 83
End: 2018-02-05

## 2018-02-05 ENCOUNTER — AMBULATORY - HEALTHEAST (OUTPATIENT)
Dept: ONCOLOGY | Facility: HOSPITAL | Age: 83
End: 2018-02-05

## 2018-02-05 ENCOUNTER — RECORDS - HEALTHEAST (OUTPATIENT)
Dept: ADMINISTRATIVE | Facility: OTHER | Age: 83
End: 2018-02-05

## 2018-02-05 DIAGNOSIS — I82.409 DVT (DEEP VENOUS THROMBOSIS) (H): ICD-10-CM

## 2018-02-05 DIAGNOSIS — E11.9 TYPE 2 DIABETES MELLITUS WITHOUT COMPLICATION, WITH LONG-TERM CURRENT USE OF INSULIN (H): ICD-10-CM

## 2018-02-05 DIAGNOSIS — I82.4Y2 ACUTE DEEP VEIN THROMBOSIS (DVT) OF PROXIMAL VEIN OF LEFT LOWER EXTREMITY (H): ICD-10-CM

## 2018-02-05 DIAGNOSIS — R07.9 CHEST PAIN, CARDIAC: ICD-10-CM

## 2018-02-05 DIAGNOSIS — K92.2 UPPER GI BLEED: ICD-10-CM

## 2018-02-05 DIAGNOSIS — I48.91 ATRIAL FIBRILLATION WITH RVR (H): ICD-10-CM

## 2018-02-05 DIAGNOSIS — C34.11 MALIGNANT NEOPLASM OF UPPER LOBE BRONCHUS, RIGHT (H): ICD-10-CM

## 2018-02-05 DIAGNOSIS — Z79.4 TYPE 2 DIABETES MELLITUS WITHOUT COMPLICATION, WITH LONG-TERM CURRENT USE OF INSULIN (H): ICD-10-CM

## 2018-02-05 DIAGNOSIS — I10 ESSENTIAL HYPERTENSION WITH GOAL BLOOD PRESSURE LESS THAN 140/90: ICD-10-CM

## 2018-02-05 DIAGNOSIS — Z71.89 ENCOUNTER FOR HERB AND VITAMIN SUPPLEMENT MANAGEMENT: ICD-10-CM

## 2018-02-05 DIAGNOSIS — I82.409 DEEP VENOUS THROMBOSIS (H): ICD-10-CM

## 2018-02-05 DIAGNOSIS — I48.0 PAROXYSMAL ATRIAL FIBRILLATION WITH RAPID VENTRICULAR RESPONSE (H): ICD-10-CM

## 2018-02-05 LAB — INR PPP: 1.39 (ref 0.9–1.1)

## 2018-02-09 ENCOUNTER — COMMUNICATION - HEALTHEAST (OUTPATIENT)
Dept: ONCOLOGY | Facility: CLINIC | Age: 83
End: 2018-02-09

## 2018-02-09 ENCOUNTER — COMMUNICATION - HEALTHEAST (OUTPATIENT)
Dept: SCHEDULING | Facility: CLINIC | Age: 83
End: 2018-02-09

## 2018-02-09 ENCOUNTER — AMBULATORY - HEALTHEAST (OUTPATIENT)
Dept: ONCOLOGY | Facility: HOSPITAL | Age: 83
End: 2018-02-09

## 2018-02-09 DIAGNOSIS — N10 INTERSTITIAL NEPHRITIS, ACUTE: ICD-10-CM

## 2018-02-12 ENCOUNTER — COMMUNICATION - HEALTHEAST (OUTPATIENT)
Dept: ONCOLOGY | Facility: HOSPITAL | Age: 83
End: 2018-02-12

## 2018-02-12 ENCOUNTER — AMBULATORY - HEALTHEAST (OUTPATIENT)
Dept: ONCOLOGY | Facility: HOSPITAL | Age: 83
End: 2018-02-12

## 2018-02-12 ENCOUNTER — AMBULATORY - HEALTHEAST (OUTPATIENT)
Dept: INFUSION THERAPY | Facility: HOSPITAL | Age: 83
End: 2018-02-12

## 2018-02-12 ENCOUNTER — COMMUNICATION - HEALTHEAST (OUTPATIENT)
Dept: INTERNAL MEDICINE | Facility: CLINIC | Age: 83
End: 2018-02-12

## 2018-02-12 DIAGNOSIS — I48.0 PAROXYSMAL ATRIAL FIBRILLATION WITH RAPID VENTRICULAR RESPONSE (H): ICD-10-CM

## 2018-02-12 DIAGNOSIS — C34.11 MALIGNANT NEOPLASM OF UPPER LOBE BRONCHUS, RIGHT (H): ICD-10-CM

## 2018-02-12 DIAGNOSIS — E11.9 TYPE 2 DIABETES MELLITUS WITHOUT COMPLICATION, WITH LONG-TERM CURRENT USE OF INSULIN (H): ICD-10-CM

## 2018-02-12 DIAGNOSIS — Z79.4 TYPE 2 DIABETES MELLITUS WITHOUT COMPLICATION, WITH LONG-TERM CURRENT USE OF INSULIN (H): ICD-10-CM

## 2018-02-12 DIAGNOSIS — N10 INTERSTITIAL NEPHRITIS, ACUTE: ICD-10-CM

## 2018-02-12 LAB
ANION GAP SERPL CALCULATED.3IONS-SCNC: 9 MMOL/L (ref 5–18)
BUN SERPL-MCNC: 33 MG/DL (ref 8–28)
CALCIUM SERPL-MCNC: 10.1 MG/DL (ref 8.5–10.5)
CHLORIDE BLD-SCNC: 106 MMOL/L (ref 98–107)
CO2 SERPL-SCNC: 28 MMOL/L (ref 22–31)
CREAT SERPL-MCNC: 1.27 MG/DL (ref 0.6–1.1)
GFR SERPL CREATININE-BSD FRML MDRD: 40 ML/MIN/1.73M2
GLUCOSE BLD-MCNC: 151 MG/DL (ref 70–125)
HBA1C MFR BLD: 5.9 % (ref 4.2–6.1)
HGB BLD-MCNC: 12.5 G/DL (ref 12–16)
INR PPP: 2.48 (ref 0.9–1.1)
POTASSIUM BLD-SCNC: 4.2 MMOL/L (ref 3.5–5)
SODIUM SERPL-SCNC: 143 MMOL/L (ref 136–145)

## 2018-02-14 ENCOUNTER — COMMUNICATION - HEALTHEAST (OUTPATIENT)
Dept: ONCOLOGY | Facility: CLINIC | Age: 83
End: 2018-02-14

## 2018-02-19 ENCOUNTER — AMBULATORY - HEALTHEAST (OUTPATIENT)
Dept: INFUSION THERAPY | Facility: HOSPITAL | Age: 83
End: 2018-02-19

## 2018-02-19 DIAGNOSIS — I48.0 PAROXYSMAL ATRIAL FIBRILLATION WITH RAPID VENTRICULAR RESPONSE (H): ICD-10-CM

## 2018-02-19 DIAGNOSIS — C34.11 MALIGNANT NEOPLASM OF UPPER LOBE BRONCHUS, RIGHT (H): ICD-10-CM

## 2018-02-19 LAB — INR PPP: 2.77 (ref 0.9–1.1)

## 2018-02-26 ENCOUNTER — OFFICE VISIT - HEALTHEAST (OUTPATIENT)
Dept: NURSING | Facility: CLINIC | Age: 83
End: 2018-02-26

## 2018-02-26 ENCOUNTER — AMBULATORY - HEALTHEAST (OUTPATIENT)
Dept: ONCOLOGY | Facility: HOSPITAL | Age: 83
End: 2018-02-26

## 2018-02-26 DIAGNOSIS — I48.0 PAROXYSMAL ATRIAL FIBRILLATION WITH RAPID VENTRICULAR RESPONSE (H): ICD-10-CM

## 2018-02-26 DIAGNOSIS — I82.409 DVT (DEEP VENOUS THROMBOSIS) (H): ICD-10-CM

## 2018-02-26 DIAGNOSIS — I10 ESSENTIAL HYPERTENSION WITH GOAL BLOOD PRESSURE LESS THAN 140/90: ICD-10-CM

## 2018-02-26 DIAGNOSIS — E11.9 TYPE 2 DIABETES MELLITUS WITHOUT COMPLICATION, WITH LONG-TERM CURRENT USE OF INSULIN (H): ICD-10-CM

## 2018-02-26 DIAGNOSIS — C34.11 MALIGNANT NEOPLASM OF UPPER LOBE BRONCHUS, RIGHT (H): ICD-10-CM

## 2018-02-26 DIAGNOSIS — Z79.4 TYPE 2 DIABETES MELLITUS WITHOUT COMPLICATION, WITH LONG-TERM CURRENT USE OF INSULIN (H): ICD-10-CM

## 2018-02-26 LAB — INR PPP: 2.7 (ref 0.9–1.1)

## 2018-03-12 ENCOUNTER — AMBULATORY - HEALTHEAST (OUTPATIENT)
Dept: PODIATRY | Facility: CLINIC | Age: 83
End: 2018-03-12

## 2018-03-12 DIAGNOSIS — L60.2 ONYCHAUXIS: ICD-10-CM

## 2018-03-12 DIAGNOSIS — M79.673 PAIN OF FOOT, UNSPECIFIED LATERALITY: ICD-10-CM

## 2018-03-12 DIAGNOSIS — E11.49 TYPE 2 DIABETES MELLITUS WITH NEUROLOGICAL MANIFESTATIONS (H): ICD-10-CM

## 2018-03-21 ENCOUNTER — RECORDS - HEALTHEAST (OUTPATIENT)
Dept: ADMINISTRATIVE | Facility: OTHER | Age: 83
End: 2018-03-21

## 2018-03-26 ENCOUNTER — OFFICE VISIT - HEALTHEAST (OUTPATIENT)
Dept: INTERNAL MEDICINE | Facility: CLINIC | Age: 83
End: 2018-03-26

## 2018-03-26 ENCOUNTER — AMBULATORY - HEALTHEAST (OUTPATIENT)
Dept: ONCOLOGY | Facility: HOSPITAL | Age: 83
End: 2018-03-26

## 2018-03-26 ENCOUNTER — OFFICE VISIT - HEALTHEAST (OUTPATIENT)
Dept: NURSING | Facility: CLINIC | Age: 83
End: 2018-03-26

## 2018-03-26 ENCOUNTER — COMMUNICATION - HEALTHEAST (OUTPATIENT)
Dept: LAB | Facility: CLINIC | Age: 83
End: 2018-03-26

## 2018-03-26 DIAGNOSIS — C34.11 MALIGNANT NEOPLASM OF UPPER LOBE BRONCHUS, RIGHT (H): ICD-10-CM

## 2018-03-26 DIAGNOSIS — Z79.4 TYPE 2 DIABETES MELLITUS WITHOUT COMPLICATION, WITH LONG-TERM CURRENT USE OF INSULIN (H): ICD-10-CM

## 2018-03-26 DIAGNOSIS — I10 ESSENTIAL HYPERTENSION WITH GOAL BLOOD PRESSURE LESS THAN 140/90: ICD-10-CM

## 2018-03-26 DIAGNOSIS — E11.9 TYPE 2 DIABETES MELLITUS (H): ICD-10-CM

## 2018-03-26 DIAGNOSIS — I48.0 PAROXYSMAL ATRIAL FIBRILLATION WITH RAPID VENTRICULAR RESPONSE (H): ICD-10-CM

## 2018-03-26 DIAGNOSIS — Z79.01 ENCOUNTER FOR MONITORING COUMADIN THERAPY: ICD-10-CM

## 2018-03-26 DIAGNOSIS — E78.00 HYPERCHOLESTEREMIA: ICD-10-CM

## 2018-03-26 DIAGNOSIS — E11.9 TYPE 2 DIABETES MELLITUS WITHOUT COMPLICATION, WITH LONG-TERM CURRENT USE OF INSULIN (H): ICD-10-CM

## 2018-03-26 DIAGNOSIS — Z51.81 ENCOUNTER FOR MONITORING COUMADIN THERAPY: ICD-10-CM

## 2018-03-26 LAB
CHOLEST SERPL-MCNC: 163 MG/DL
FASTING STATUS PATIENT QL REPORTED: NO
HDLC SERPL-MCNC: 53 MG/DL
INR PPP: 5.2 (ref 0.9–1.1)
LDLC SERPL CALC-MCNC: 95 MG/DL
TRIGL SERPL-MCNC: 77 MG/DL

## 2018-03-26 ASSESSMENT — MIFFLIN-ST. JEOR: SCORE: 875.87

## 2018-04-02 ENCOUNTER — HOSPITAL ENCOUNTER (OUTPATIENT)
Dept: CT IMAGING | Facility: HOSPITAL | Age: 83
Setting detail: RADIATION/ONCOLOGY SERIES
Discharge: STILL ADMITTED OR ADMINISTRATIVE | End: 2018-04-02
Attending: INTERNAL MEDICINE

## 2018-04-02 ENCOUNTER — AMBULATORY - HEALTHEAST (OUTPATIENT)
Dept: INFUSION THERAPY | Facility: HOSPITAL | Age: 83
End: 2018-04-02

## 2018-04-02 ENCOUNTER — AMBULATORY - HEALTHEAST (OUTPATIENT)
Dept: ONCOLOGY | Facility: HOSPITAL | Age: 83
End: 2018-04-02

## 2018-04-02 DIAGNOSIS — C34.11 MALIGNANT NEOPLASM OF UPPER LOBE BRONCHUS, RIGHT (H): ICD-10-CM

## 2018-04-02 DIAGNOSIS — I48.0 PAROXYSMAL ATRIAL FIBRILLATION WITH RAPID VENTRICULAR RESPONSE (H): ICD-10-CM

## 2018-04-02 DIAGNOSIS — Z51.81 ENCOUNTER FOR MONITORING COUMADIN THERAPY: ICD-10-CM

## 2018-04-02 DIAGNOSIS — Z79.01 ENCOUNTER FOR MONITORING COUMADIN THERAPY: ICD-10-CM

## 2018-04-02 LAB
ALBUMIN SERPL-MCNC: 3.2 G/DL (ref 3.5–5)
ALP SERPL-CCNC: 218 U/L (ref 45–120)
ALT SERPL W P-5'-P-CCNC: 68 U/L (ref 0–45)
ANION GAP SERPL CALCULATED.3IONS-SCNC: 9 MMOL/L (ref 5–18)
AST SERPL W P-5'-P-CCNC: 61 U/L (ref 0–40)
BASOPHILS # BLD AUTO: 0.1 THOU/UL (ref 0–0.2)
BASOPHILS NFR BLD AUTO: 1 % (ref 0–2)
BILIRUB SERPL-MCNC: 0.6 MG/DL (ref 0–1)
BUN SERPL-MCNC: 23 MG/DL (ref 8–28)
CALCIUM SERPL-MCNC: 10 MG/DL (ref 8.5–10.5)
CHLORIDE BLD-SCNC: 105 MMOL/L (ref 98–107)
CO2 SERPL-SCNC: 28 MMOL/L (ref 22–31)
CREAT SERPL-MCNC: 1.25 MG/DL (ref 0.6–1.1)
EOSINOPHIL # BLD AUTO: 0.7 THOU/UL (ref 0–0.4)
EOSINOPHIL NFR BLD AUTO: 7 % (ref 0–6)
ERYTHROCYTE [DISTWIDTH] IN BLOOD BY AUTOMATED COUNT: 15.6 % (ref 11–14.5)
GFR SERPL CREATININE-BSD FRML MDRD: 41 ML/MIN/1.73M2
GLUCOSE BLD-MCNC: 153 MG/DL (ref 70–125)
HCT VFR BLD AUTO: 37.5 % (ref 35–47)
HGB BLD-MCNC: 11.8 G/DL (ref 12–16)
INR PPP: 1.75 (ref 0.9–1.1)
LYMPHOCYTES # BLD AUTO: 1.2 THOU/UL (ref 0.8–4.4)
LYMPHOCYTES NFR BLD AUTO: 11 % (ref 20–40)
MCH RBC QN AUTO: 28.2 PG (ref 27–34)
MCHC RBC AUTO-ENTMCNC: 31.5 G/DL (ref 32–36)
MCV RBC AUTO: 90 FL (ref 80–100)
MONOCYTES # BLD AUTO: 0.9 THOU/UL (ref 0–0.9)
MONOCYTES NFR BLD AUTO: 9 % (ref 2–10)
NEUTROPHILS # BLD AUTO: 7.7 THOU/UL (ref 2–7.7)
NEUTROPHILS NFR BLD AUTO: 72 % (ref 50–70)
PLATELET # BLD AUTO: 247 THOU/UL (ref 140–440)
PMV BLD AUTO: 10.4 FL (ref 8.5–12.5)
POTASSIUM BLD-SCNC: 4.2 MMOL/L (ref 3.5–5)
PROT SERPL-MCNC: 6.4 G/DL (ref 6–8)
RBC # BLD AUTO: 4.18 MILL/UL (ref 3.8–5.4)
SODIUM SERPL-SCNC: 142 MMOL/L (ref 136–145)
WBC: 10.7 THOU/UL (ref 4–11)

## 2018-04-04 ENCOUNTER — OFFICE VISIT - HEALTHEAST (OUTPATIENT)
Dept: ONCOLOGY | Facility: HOSPITAL | Age: 83
End: 2018-04-04

## 2018-04-04 DIAGNOSIS — C34.11 MALIGNANT NEOPLASM OF UPPER LOBE BRONCHUS, RIGHT (H): ICD-10-CM

## 2018-04-16 ENCOUNTER — COMMUNICATION - HEALTHEAST (OUTPATIENT)
Dept: ONCOLOGY | Facility: HOSPITAL | Age: 83
End: 2018-04-16

## 2018-04-17 ENCOUNTER — COMMUNICATION - HEALTHEAST (OUTPATIENT)
Dept: SCHEDULING | Facility: CLINIC | Age: 83
End: 2018-04-17

## 2018-04-17 ENCOUNTER — COMMUNICATION - HEALTHEAST (OUTPATIENT)
Dept: ONCOLOGY | Facility: HOSPITAL | Age: 83
End: 2018-04-17

## 2018-04-20 ENCOUNTER — AMBULATORY - HEALTHEAST (OUTPATIENT)
Dept: ONCOLOGY | Facility: HOSPITAL | Age: 83
End: 2018-04-20

## 2018-04-20 ENCOUNTER — COMMUNICATION - HEALTHEAST (OUTPATIENT)
Dept: ONCOLOGY | Facility: HOSPITAL | Age: 83
End: 2018-04-20

## 2018-04-20 DIAGNOSIS — C34.11 MALIGNANT NEOPLASM OF UPPER LOBE BRONCHUS, RIGHT (H): ICD-10-CM

## 2018-04-23 ENCOUNTER — COMMUNICATION - HEALTHEAST (OUTPATIENT)
Dept: ONCOLOGY | Facility: HOSPITAL | Age: 83
End: 2018-04-23

## 2018-04-24 ENCOUNTER — AMBULATORY - HEALTHEAST (OUTPATIENT)
Dept: ONCOLOGY | Facility: HOSPITAL | Age: 83
End: 2018-04-24

## 2018-04-24 ENCOUNTER — OFFICE VISIT - HEALTHEAST (OUTPATIENT)
Dept: INTERNAL MEDICINE | Facility: CLINIC | Age: 83
End: 2018-04-24

## 2018-04-24 DIAGNOSIS — C34.11 MALIGNANT NEOPLASM OF UPPER LOBE BRONCHUS, RIGHT (H): ICD-10-CM

## 2018-04-24 DIAGNOSIS — Z51.81 MONITORING FOR ANTICOAGULANT USE: ICD-10-CM

## 2018-04-24 DIAGNOSIS — I48.0 PAROXYSMAL ATRIAL FIBRILLATION WITH RAPID VENTRICULAR RESPONSE (H): ICD-10-CM

## 2018-04-24 DIAGNOSIS — Z79.01 MONITORING FOR ANTICOAGULANT USE: ICD-10-CM

## 2018-04-24 DIAGNOSIS — E83.42 HYPOMAGNESEMIA: ICD-10-CM

## 2018-04-24 LAB
ANION GAP SERPL CALCULATED.3IONS-SCNC: 11 MMOL/L (ref 5–18)
BUN SERPL-MCNC: 18 MG/DL (ref 8–28)
CALCIUM SERPL-MCNC: 9.4 MG/DL (ref 8.5–10.5)
CHLORIDE BLD-SCNC: 103 MMOL/L (ref 98–107)
CO2 SERPL-SCNC: 27 MMOL/L (ref 22–31)
CREAT SERPL-MCNC: 1.21 MG/DL (ref 0.6–1.1)
ERYTHROCYTE [DISTWIDTH] IN BLOOD BY AUTOMATED COUNT: 14.5 % (ref 11–14.5)
GFR SERPL CREATININE-BSD FRML MDRD: 42 ML/MIN/1.73M2
GLUCOSE BLD-MCNC: 146 MG/DL (ref 70–125)
HCT VFR BLD AUTO: 31.6 % (ref 35–47)
HGB BLD-MCNC: 10.4 G/DL (ref 12–16)
INR PPP: 2.1 (ref 0.9–1.1)
MAGNESIUM SERPL-MCNC: 1.7 MG/DL (ref 1.8–2.6)
MCH RBC QN AUTO: 27.1 PG (ref 27–34)
MCHC RBC AUTO-ENTMCNC: 33 G/DL (ref 32–36)
MCV RBC AUTO: 82 FL (ref 80–100)
PLATELET # BLD AUTO: 231 THOU/UL (ref 140–440)
PMV BLD AUTO: 7.3 FL (ref 7–10)
POTASSIUM BLD-SCNC: 4.8 MMOL/L (ref 3.5–5)
RBC # BLD AUTO: 3.85 MILL/UL (ref 3.8–5.4)
SODIUM SERPL-SCNC: 141 MMOL/L (ref 136–145)
WBC: 6.7 THOU/UL (ref 4–11)

## 2018-04-27 ENCOUNTER — COMMUNICATION - HEALTHEAST (OUTPATIENT)
Dept: INTERNAL MEDICINE | Facility: CLINIC | Age: 83
End: 2018-04-27

## 2018-05-01 ENCOUNTER — HOSPITAL ENCOUNTER (OUTPATIENT)
Dept: CT IMAGING | Facility: HOSPITAL | Age: 83
Setting detail: RADIATION/ONCOLOGY SERIES
Discharge: STILL ADMITTED OR ADMINISTRATIVE | End: 2018-05-01
Attending: INTERNAL MEDICINE

## 2018-05-01 DIAGNOSIS — C34.11 MALIGNANT NEOPLASM OF UPPER LOBE BRONCHUS, RIGHT (H): ICD-10-CM

## 2018-05-03 ENCOUNTER — OFFICE VISIT - HEALTHEAST (OUTPATIENT)
Dept: ONCOLOGY | Facility: HOSPITAL | Age: 83
End: 2018-05-03

## 2018-05-03 ENCOUNTER — AMBULATORY - HEALTHEAST (OUTPATIENT)
Dept: INFUSION THERAPY | Facility: HOSPITAL | Age: 83
End: 2018-05-03

## 2018-05-03 DIAGNOSIS — C34.11 MALIGNANT NEOPLASM OF UPPER LOBE BRONCHUS, RIGHT (H): ICD-10-CM

## 2018-05-03 DIAGNOSIS — I10 ESSENTIAL HYPERTENSION: ICD-10-CM

## 2018-05-03 DIAGNOSIS — I48.0 PAROXYSMAL ATRIAL FIBRILLATION WITH RAPID VENTRICULAR RESPONSE (H): ICD-10-CM

## 2018-05-03 LAB
ANION GAP SERPL CALCULATED.3IONS-SCNC: 13 MMOL/L (ref 5–18)
BUN SERPL-MCNC: 23 MG/DL (ref 8–28)
CALCIUM SERPL-MCNC: 10.2 MG/DL (ref 8.5–10.5)
CHLORIDE BLD-SCNC: 102 MMOL/L (ref 98–107)
CO2 SERPL-SCNC: 28 MMOL/L (ref 22–31)
CREAT SERPL-MCNC: 1.27 MG/DL (ref 0.6–1.1)
ERYTHROCYTE [DISTWIDTH] IN BLOOD BY AUTOMATED COUNT: 18.6 % (ref 11–14.5)
GFR SERPL CREATININE-BSD FRML MDRD: 40 ML/MIN/1.73M2
GLUCOSE BLD-MCNC: 170 MG/DL (ref 70–125)
HCT VFR BLD AUTO: 34.4 % (ref 35–47)
HGB BLD-MCNC: 10.6 G/DL (ref 12–16)
INR PPP: 3.31 (ref 0.9–1.1)
MCH RBC QN AUTO: 27.8 PG (ref 27–34)
MCHC RBC AUTO-ENTMCNC: 30.8 G/DL (ref 32–36)
MCV RBC AUTO: 90 FL (ref 80–100)
PLATELET # BLD AUTO: 259 THOU/UL (ref 140–440)
PMV BLD AUTO: 9.6 FL (ref 8.5–12.5)
POTASSIUM BLD-SCNC: 4.3 MMOL/L (ref 3.5–5)
RBC # BLD AUTO: 3.81 MILL/UL (ref 3.8–5.4)
SODIUM SERPL-SCNC: 143 MMOL/L (ref 136–145)
WBC: 5.9 THOU/UL (ref 4–11)

## 2018-05-04 ENCOUNTER — COMMUNICATION - HEALTHEAST (OUTPATIENT)
Dept: ONCOLOGY | Facility: CLINIC | Age: 83
End: 2018-05-04

## 2018-05-07 ENCOUNTER — AMBULATORY - HEALTHEAST (OUTPATIENT)
Dept: ONCOLOGY | Facility: HOSPITAL | Age: 83
End: 2018-05-07

## 2018-05-14 ENCOUNTER — AMBULATORY - HEALTHEAST (OUTPATIENT)
Dept: INFUSION THERAPY | Facility: HOSPITAL | Age: 83
End: 2018-05-14

## 2018-05-14 ENCOUNTER — AMBULATORY - HEALTHEAST (OUTPATIENT)
Dept: ONCOLOGY | Facility: HOSPITAL | Age: 83
End: 2018-05-14

## 2018-05-14 ENCOUNTER — AMBULATORY - HEALTHEAST (OUTPATIENT)
Dept: PODIATRY | Facility: CLINIC | Age: 83
End: 2018-05-14

## 2018-05-14 DIAGNOSIS — L60.2 ONYCHAUXIS: ICD-10-CM

## 2018-05-14 DIAGNOSIS — C34.11 MALIGNANT NEOPLASM OF UPPER LOBE BRONCHUS, RIGHT (H): ICD-10-CM

## 2018-05-14 DIAGNOSIS — I48.0 PAROXYSMAL ATRIAL FIBRILLATION WITH RAPID VENTRICULAR RESPONSE (H): ICD-10-CM

## 2018-05-14 DIAGNOSIS — E11.49 TYPE 2 DIABETES MELLITUS WITH NEUROLOGICAL MANIFESTATIONS (H): ICD-10-CM

## 2018-05-14 DIAGNOSIS — M79.673 PAIN OF FOOT, UNSPECIFIED LATERALITY: ICD-10-CM

## 2018-05-14 LAB — INR PPP: 2.36 (ref 0.9–1.1)

## 2018-05-14 ASSESSMENT — MIFFLIN-ST. JEOR: SCORE: 835.05

## 2018-05-15 ENCOUNTER — COMMUNICATION - HEALTHEAST (OUTPATIENT)
Dept: ONCOLOGY | Facility: HOSPITAL | Age: 83
End: 2018-05-15

## 2018-05-16 ENCOUNTER — COMMUNICATION - HEALTHEAST (OUTPATIENT)
Dept: INTERNAL MEDICINE | Facility: CLINIC | Age: 83
End: 2018-05-16

## 2018-05-16 DIAGNOSIS — E11.9 DIABETES MELLITUS (H): ICD-10-CM

## 2018-05-17 ENCOUNTER — COMMUNICATION - HEALTHEAST (OUTPATIENT)
Dept: INTERNAL MEDICINE | Facility: CLINIC | Age: 83
End: 2018-05-17

## 2018-05-17 DIAGNOSIS — E11.9 TYPE 2 DIABETES MELLITUS (H): ICD-10-CM

## 2018-05-22 ENCOUNTER — AMBULATORY - HEALTHEAST (OUTPATIENT)
Dept: ONCOLOGY | Facility: HOSPITAL | Age: 83
End: 2018-05-22

## 2018-05-22 ENCOUNTER — COMMUNICATION - HEALTHEAST (OUTPATIENT)
Dept: INTERNAL MEDICINE | Facility: CLINIC | Age: 83
End: 2018-05-22

## 2018-05-22 ENCOUNTER — COMMUNICATION - HEALTHEAST (OUTPATIENT)
Dept: ONCOLOGY | Facility: HOSPITAL | Age: 83
End: 2018-05-22

## 2018-05-22 DIAGNOSIS — I48.0 PAROXYSMAL ATRIAL FIBRILLATION WITH RAPID VENTRICULAR RESPONSE (H): ICD-10-CM

## 2018-05-22 DIAGNOSIS — C34.11 MALIGNANT NEOPLASM OF UPPER LOBE BRONCHUS, RIGHT (H): ICD-10-CM

## 2018-05-22 DIAGNOSIS — R94.39 ABNORMAL NUCLEAR STRESS TEST: ICD-10-CM

## 2018-05-22 DIAGNOSIS — D64.9 ANEMIA: ICD-10-CM

## 2018-05-25 ENCOUNTER — AMBULATORY - HEALTHEAST (OUTPATIENT)
Dept: ONCOLOGY | Facility: HOSPITAL | Age: 83
End: 2018-05-25

## 2018-05-25 ENCOUNTER — AMBULATORY - HEALTHEAST (OUTPATIENT)
Dept: INFUSION THERAPY | Facility: HOSPITAL | Age: 83
End: 2018-05-25

## 2018-05-25 DIAGNOSIS — C34.11 MALIGNANT NEOPLASM OF UPPER LOBE BRONCHUS, RIGHT (H): ICD-10-CM

## 2018-05-25 DIAGNOSIS — I48.0 PAROXYSMAL ATRIAL FIBRILLATION WITH RAPID VENTRICULAR RESPONSE (H): ICD-10-CM

## 2018-05-25 LAB
ALBUMIN SERPL-MCNC: 3.4 G/DL (ref 3.5–5)
ALP SERPL-CCNC: 89 U/L (ref 45–120)
ALT SERPL W P-5'-P-CCNC: 24 U/L (ref 0–45)
ANION GAP SERPL CALCULATED.3IONS-SCNC: 8 MMOL/L (ref 5–18)
AST SERPL W P-5'-P-CCNC: 32 U/L (ref 0–40)
BASOPHILS # BLD AUTO: 0.1 THOU/UL (ref 0–0.2)
BASOPHILS NFR BLD AUTO: 1 % (ref 0–2)
BILIRUB SERPL-MCNC: 0.6 MG/DL (ref 0–1)
BUN SERPL-MCNC: 31 MG/DL (ref 8–28)
CALCIUM SERPL-MCNC: 9.8 MG/DL (ref 8.5–10.5)
CHLORIDE BLD-SCNC: 106 MMOL/L (ref 98–107)
CO2 SERPL-SCNC: 29 MMOL/L (ref 22–31)
CREAT SERPL-MCNC: 1.31 MG/DL (ref 0.6–1.1)
EOSINOPHIL # BLD AUTO: 0.4 THOU/UL (ref 0–0.4)
EOSINOPHIL NFR BLD AUTO: 6 % (ref 0–6)
ERYTHROCYTE [DISTWIDTH] IN BLOOD BY AUTOMATED COUNT: 18.3 % (ref 11–14.5)
GFR SERPL CREATININE-BSD FRML MDRD: 39 ML/MIN/1.73M2
GLUCOSE BLD-MCNC: 96 MG/DL (ref 70–125)
HCT VFR BLD AUTO: 34.8 % (ref 35–47)
HGB BLD-MCNC: 10.7 G/DL (ref 12–16)
INR PPP: 2.73 (ref 0.9–1.1)
LYMPHOCYTES # BLD AUTO: 1.4 THOU/UL (ref 0.8–4.4)
LYMPHOCYTES NFR BLD AUTO: 19 % (ref 20–40)
MCH RBC QN AUTO: 27.6 PG (ref 27–34)
MCHC RBC AUTO-ENTMCNC: 30.7 G/DL (ref 32–36)
MCV RBC AUTO: 90 FL (ref 80–100)
MONOCYTES # BLD AUTO: 0.7 THOU/UL (ref 0–0.9)
MONOCYTES NFR BLD AUTO: 10 % (ref 2–10)
NEUTROPHILS # BLD AUTO: 4.5 THOU/UL (ref 2–7.7)
NEUTROPHILS NFR BLD AUTO: 65 % (ref 50–70)
PLATELET # BLD AUTO: 246 THOU/UL (ref 140–440)
PMV BLD AUTO: 10.2 FL (ref 8.5–12.5)
POTASSIUM BLD-SCNC: 4.1 MMOL/L (ref 3.5–5)
PROT SERPL-MCNC: 6.5 G/DL (ref 6–8)
RBC # BLD AUTO: 3.88 MILL/UL (ref 3.8–5.4)
SODIUM SERPL-SCNC: 143 MMOL/L (ref 136–145)
WBC: 7 THOU/UL (ref 4–11)

## 2018-06-13 ENCOUNTER — AMBULATORY - HEALTHEAST (OUTPATIENT)
Dept: INFUSION THERAPY | Facility: HOSPITAL | Age: 83
End: 2018-06-13

## 2018-06-13 ENCOUNTER — AMBULATORY - HEALTHEAST (OUTPATIENT)
Dept: ONCOLOGY | Facility: HOSPITAL | Age: 83
End: 2018-06-13

## 2018-06-13 DIAGNOSIS — C34.11 MALIGNANT NEOPLASM OF UPPER LOBE BRONCHUS, RIGHT (H): ICD-10-CM

## 2018-06-13 DIAGNOSIS — I48.0 PAROXYSMAL ATRIAL FIBRILLATION WITH RAPID VENTRICULAR RESPONSE (H): ICD-10-CM

## 2018-06-13 LAB
ALBUMIN SERPL-MCNC: 3.5 G/DL (ref 3.5–5)
ALP SERPL-CCNC: 85 U/L (ref 45–120)
ALT SERPL W P-5'-P-CCNC: 25 U/L (ref 0–45)
ANION GAP SERPL CALCULATED.3IONS-SCNC: 10 MMOL/L (ref 5–18)
AST SERPL W P-5'-P-CCNC: 34 U/L (ref 0–40)
BASOPHILS # BLD AUTO: 0.1 THOU/UL (ref 0–0.2)
BASOPHILS NFR BLD AUTO: 1 % (ref 0–2)
BILIRUB SERPL-MCNC: 0.3 MG/DL (ref 0–1)
BUN SERPL-MCNC: 25 MG/DL (ref 8–28)
CALCIUM SERPL-MCNC: 10.2 MG/DL (ref 8.5–10.5)
CHLORIDE BLD-SCNC: 106 MMOL/L (ref 98–107)
CO2 SERPL-SCNC: 25 MMOL/L (ref 22–31)
CREAT SERPL-MCNC: 1.15 MG/DL (ref 0.6–1.1)
EOSINOPHIL # BLD AUTO: 0.4 THOU/UL (ref 0–0.4)
EOSINOPHIL NFR BLD AUTO: 5 % (ref 0–6)
ERYTHROCYTE [DISTWIDTH] IN BLOOD BY AUTOMATED COUNT: 16.9 % (ref 11–14.5)
GFR SERPL CREATININE-BSD FRML MDRD: 45 ML/MIN/1.73M2
GLUCOSE BLD-MCNC: 129 MG/DL (ref 70–125)
HCT VFR BLD AUTO: 35.8 % (ref 35–47)
HGB BLD-MCNC: 11.3 G/DL (ref 12–16)
INR PPP: 3.55 (ref 0.9–1.1)
LYMPHOCYTES # BLD AUTO: 1.4 THOU/UL (ref 0.8–4.4)
LYMPHOCYTES NFR BLD AUTO: 19 % (ref 20–40)
MCH RBC QN AUTO: 28 PG (ref 27–34)
MCHC RBC AUTO-ENTMCNC: 31.6 G/DL (ref 32–36)
MCV RBC AUTO: 89 FL (ref 80–100)
MONOCYTES # BLD AUTO: 0.7 THOU/UL (ref 0–0.9)
MONOCYTES NFR BLD AUTO: 10 % (ref 2–10)
NEUTROPHILS # BLD AUTO: 4.6 THOU/UL (ref 2–7.7)
NEUTROPHILS NFR BLD AUTO: 66 % (ref 50–70)
PLATELET # BLD AUTO: 213 THOU/UL (ref 140–440)
PMV BLD AUTO: 9.8 FL (ref 8.5–12.5)
POTASSIUM BLD-SCNC: 4.3 MMOL/L (ref 3.5–5)
PROT SERPL-MCNC: 6.6 G/DL (ref 6–8)
RBC # BLD AUTO: 4.04 MILL/UL (ref 3.8–5.4)
SODIUM SERPL-SCNC: 141 MMOL/L (ref 136–145)
WBC: 7 THOU/UL (ref 4–11)

## 2018-06-14 ENCOUNTER — OFFICE VISIT - HEALTHEAST (OUTPATIENT)
Dept: ONCOLOGY | Facility: HOSPITAL | Age: 83
End: 2018-06-14

## 2018-06-14 DIAGNOSIS — C34.11 MALIGNANT NEOPLASM OF UPPER LOBE BRONCHUS, RIGHT (H): ICD-10-CM

## 2018-06-14 DIAGNOSIS — R29.818 OTHER SYMPTOMS AND SIGNS INVOLVING THE NERVOUS SYSTEM: ICD-10-CM

## 2018-06-18 ENCOUNTER — COMMUNICATION - HEALTHEAST (OUTPATIENT)
Dept: INTERNAL MEDICINE | Facility: CLINIC | Age: 83
End: 2018-06-18

## 2018-06-18 DIAGNOSIS — I25.9 ISCHEMIC HEART DISEASE: ICD-10-CM

## 2018-06-18 DIAGNOSIS — E78.5 HYPERLIPIDEMIA: ICD-10-CM

## 2018-06-27 ENCOUNTER — AMBULATORY - HEALTHEAST (OUTPATIENT)
Dept: ONCOLOGY | Facility: HOSPITAL | Age: 83
End: 2018-06-27

## 2018-06-27 ENCOUNTER — AMBULATORY - HEALTHEAST (OUTPATIENT)
Dept: INFUSION THERAPY | Facility: HOSPITAL | Age: 83
End: 2018-06-27

## 2018-06-27 DIAGNOSIS — C34.11 MALIGNANT NEOPLASM OF UPPER LOBE BRONCHUS, RIGHT (H): ICD-10-CM

## 2018-06-27 DIAGNOSIS — I48.0 PAROXYSMAL ATRIAL FIBRILLATION WITH RAPID VENTRICULAR RESPONSE (H): ICD-10-CM

## 2018-06-27 DIAGNOSIS — Z51.81 ENCOUNTER FOR MONITORING COUMADIN THERAPY: ICD-10-CM

## 2018-06-27 DIAGNOSIS — Z79.01 ENCOUNTER FOR MONITORING COUMADIN THERAPY: ICD-10-CM

## 2018-06-27 LAB — INR PPP: 2.76 (ref 0.9–1.1)

## 2018-07-13 ENCOUNTER — AMBULATORY - HEALTHEAST (OUTPATIENT)
Dept: FAMILY MEDICINE | Facility: CLINIC | Age: 83
End: 2018-07-13

## 2018-07-13 ENCOUNTER — OFFICE VISIT - HEALTHEAST (OUTPATIENT)
Dept: INTERNAL MEDICINE | Facility: CLINIC | Age: 83
End: 2018-07-13

## 2018-07-13 DIAGNOSIS — F41.9 ANXIETY: ICD-10-CM

## 2018-07-13 DIAGNOSIS — E11.9 DIABETES MELLITUS, TYPE 2 (H): ICD-10-CM

## 2018-07-13 DIAGNOSIS — R60.0 LEG EDEMA, LEFT: ICD-10-CM

## 2018-07-13 DIAGNOSIS — M79.89 LEFT LEG SWELLING: ICD-10-CM

## 2018-07-13 DIAGNOSIS — I10 BENIGN ESSENTIAL HYPERTENSION: ICD-10-CM

## 2018-07-13 LAB
D DIMER PPP FEU-MCNC: 0.78 FEU UG/ML
HBA1C MFR BLD: 7 % (ref 3.5–6)
INR PPP: 2.85 (ref 0.9–1.1)

## 2018-07-14 ENCOUNTER — HOSPITAL ENCOUNTER (OUTPATIENT)
Dept: ULTRASOUND IMAGING | Facility: CLINIC | Age: 83
Discharge: HOME | End: 2018-07-14
Attending: INTERNAL MEDICINE

## 2018-07-14 DIAGNOSIS — R60.0 LEG EDEMA, LEFT: ICD-10-CM

## 2018-07-17 ENCOUNTER — COMMUNICATION - HEALTHEAST (OUTPATIENT)
Dept: ONCOLOGY | Facility: HOSPITAL | Age: 83
End: 2018-07-17

## 2018-07-18 ENCOUNTER — AMBULATORY - HEALTHEAST (OUTPATIENT)
Dept: ONCOLOGY | Facility: HOSPITAL | Age: 83
End: 2018-07-18

## 2018-07-18 DIAGNOSIS — Z79.01 ENCOUNTER FOR MONITORING COUMADIN THERAPY: ICD-10-CM

## 2018-07-18 DIAGNOSIS — Z51.81 ENCOUNTER FOR MONITORING COUMADIN THERAPY: ICD-10-CM

## 2018-08-07 ENCOUNTER — COMMUNICATION - HEALTHEAST (OUTPATIENT)
Dept: INTERNAL MEDICINE | Facility: CLINIC | Age: 83
End: 2018-08-07

## 2018-08-07 DIAGNOSIS — I10 ESSENTIAL HYPERTENSION: ICD-10-CM

## 2018-08-13 ENCOUNTER — HOSPITAL ENCOUNTER (OUTPATIENT)
Dept: CT IMAGING | Facility: HOSPITAL | Age: 83
Setting detail: RADIATION/ONCOLOGY SERIES
Discharge: STILL ADMITTED OR ADMINISTRATIVE | End: 2018-08-13
Attending: INTERNAL MEDICINE

## 2018-08-13 DIAGNOSIS — C34.11 MALIGNANT NEOPLASM OF UPPER LOBE BRONCHUS, RIGHT (H): ICD-10-CM

## 2018-08-15 ENCOUNTER — OFFICE VISIT - HEALTHEAST (OUTPATIENT)
Dept: ONCOLOGY | Facility: HOSPITAL | Age: 83
End: 2018-08-15

## 2018-08-15 ENCOUNTER — COMMUNICATION - HEALTHEAST (OUTPATIENT)
Dept: INTERNAL MEDICINE | Facility: CLINIC | Age: 83
End: 2018-08-15

## 2018-08-15 ENCOUNTER — AMBULATORY - HEALTHEAST (OUTPATIENT)
Dept: INFUSION THERAPY | Facility: HOSPITAL | Age: 83
End: 2018-08-15

## 2018-08-15 DIAGNOSIS — E11.9 TYPE 2 DIABETES MELLITUS (H): ICD-10-CM

## 2018-08-15 DIAGNOSIS — I48.0 PAROXYSMAL ATRIAL FIBRILLATION WITH RAPID VENTRICULAR RESPONSE (H): ICD-10-CM

## 2018-08-15 DIAGNOSIS — C34.11 MALIGNANT NEOPLASM OF UPPER LOBE BRONCHUS, RIGHT (H): ICD-10-CM

## 2018-08-15 DIAGNOSIS — E11.9 DIABETES MELLITUS (H): ICD-10-CM

## 2018-08-15 LAB
ALBUMIN SERPL-MCNC: 3.5 G/DL (ref 3.5–5)
ALP SERPL-CCNC: 99 U/L (ref 45–120)
ALT SERPL W P-5'-P-CCNC: 38 U/L (ref 0–45)
ANION GAP SERPL CALCULATED.3IONS-SCNC: 7 MMOL/L (ref 5–18)
AST SERPL W P-5'-P-CCNC: 46 U/L (ref 0–40)
BASOPHILS # BLD AUTO: 0 THOU/UL (ref 0–0.2)
BASOPHILS NFR BLD AUTO: 1 % (ref 0–2)
BILIRUB SERPL-MCNC: 0.3 MG/DL (ref 0–1)
BUN SERPL-MCNC: 38 MG/DL (ref 8–28)
CALCIUM SERPL-MCNC: 9.7 MG/DL (ref 8.5–10.5)
CHLORIDE BLD-SCNC: 107 MMOL/L (ref 98–107)
CO2 SERPL-SCNC: 28 MMOL/L (ref 22–31)
CREAT SERPL-MCNC: 1.43 MG/DL (ref 0.6–1.1)
EOSINOPHIL # BLD AUTO: 0.6 THOU/UL (ref 0–0.4)
EOSINOPHIL NFR BLD AUTO: 9 % (ref 0–6)
ERYTHROCYTE [DISTWIDTH] IN BLOOD BY AUTOMATED COUNT: 15.9 % (ref 11–14.5)
GFR SERPL CREATININE-BSD FRML MDRD: 35 ML/MIN/1.73M2
GLUCOSE BLD-MCNC: 207 MG/DL (ref 70–125)
HCT VFR BLD AUTO: 32.8 % (ref 35–47)
HGB BLD-MCNC: 10.3 G/DL (ref 12–16)
INR PPP: 4.87 (ref 0.9–1.1)
LDH SERPL L TO P-CCNC: 225 U/L (ref 125–220)
LYMPHOCYTES # BLD AUTO: 1.2 THOU/UL (ref 0.8–4.4)
LYMPHOCYTES NFR BLD AUTO: 17 % (ref 20–40)
MCH RBC QN AUTO: 27.9 PG (ref 27–34)
MCHC RBC AUTO-ENTMCNC: 31.4 G/DL (ref 32–36)
MCV RBC AUTO: 89 FL (ref 80–100)
MONOCYTES # BLD AUTO: 0.6 THOU/UL (ref 0–0.9)
MONOCYTES NFR BLD AUTO: 9 % (ref 2–10)
NEUTROPHILS # BLD AUTO: 4.3 THOU/UL (ref 2–7.7)
NEUTROPHILS NFR BLD AUTO: 64 % (ref 50–70)
PLATELET # BLD AUTO: 194 THOU/UL (ref 140–440)
PMV BLD AUTO: 11 FL (ref 8.5–12.5)
POTASSIUM BLD-SCNC: 4.5 MMOL/L (ref 3.5–5)
PROT SERPL-MCNC: 6.3 G/DL (ref 6–8)
RBC # BLD AUTO: 3.69 MILL/UL (ref 3.8–5.4)
SODIUM SERPL-SCNC: 142 MMOL/L (ref 136–145)
WBC: 6.8 THOU/UL (ref 4–11)

## 2018-08-17 ENCOUNTER — COMMUNICATION - HEALTHEAST (OUTPATIENT)
Dept: ONCOLOGY | Facility: HOSPITAL | Age: 83
End: 2018-08-17

## 2018-08-21 ENCOUNTER — OFFICE VISIT - HEALTHEAST (OUTPATIENT)
Dept: PODIATRY | Facility: CLINIC | Age: 83
End: 2018-08-21

## 2018-08-21 DIAGNOSIS — B35.1 NAIL FUNGUS: ICD-10-CM

## 2018-08-21 DIAGNOSIS — E11.8 TYPE 2 DIABETES MELLITUS WITH COMPLICATION, WITH LONG-TERM CURRENT USE OF INSULIN (H): ICD-10-CM

## 2018-08-21 DIAGNOSIS — L60.2 ONYCHAUXIS: ICD-10-CM

## 2018-08-21 DIAGNOSIS — Z79.4 TYPE 2 DIABETES MELLITUS WITH COMPLICATION, WITH LONG-TERM CURRENT USE OF INSULIN (H): ICD-10-CM

## 2018-08-21 DIAGNOSIS — E11.49 TYPE 2 DIABETES MELLITUS WITH NEUROLOGICAL MANIFESTATIONS (H): ICD-10-CM

## 2019-01-01 ENCOUNTER — AMBULATORY - HEALTHEAST (OUTPATIENT)
Dept: ONCOLOGY | Facility: HOSPITAL | Age: 84
End: 2019-01-01

## 2019-01-01 ENCOUNTER — COMMUNICATION - HEALTHEAST (OUTPATIENT)
Dept: INTERNAL MEDICINE | Facility: CLINIC | Age: 84
End: 2019-01-01

## 2019-01-01 ENCOUNTER — AMBULATORY - HEALTHEAST (OUTPATIENT)
Dept: INFUSION THERAPY | Facility: HOSPITAL | Age: 84
End: 2019-01-01

## 2019-01-01 ENCOUNTER — HOSPITAL ENCOUNTER (OUTPATIENT)
Dept: CT IMAGING | Facility: HOSPITAL | Age: 84
Setting detail: RADIATION/ONCOLOGY SERIES
Discharge: STILL ADMITTED OR ADMINISTRATIVE | End: 2019-02-12
Attending: INTERNAL MEDICINE

## 2019-01-01 ENCOUNTER — COMMUNICATION - HEALTHEAST (OUTPATIENT)
Dept: ONCOLOGY | Facility: HOSPITAL | Age: 84
End: 2019-01-01

## 2019-01-01 ENCOUNTER — RECORDS - HEALTHEAST (OUTPATIENT)
Dept: ADMINISTRATIVE | Facility: OTHER | Age: 84
End: 2019-01-01

## 2019-01-01 ENCOUNTER — HOME CARE/HOSPICE - HEALTHEAST (OUTPATIENT)
Dept: HOSPICE | Facility: HOSPICE | Age: 84
End: 2019-01-01

## 2019-01-01 ENCOUNTER — COMMUNICATION - HEALTHEAST (OUTPATIENT)
Dept: SCHEDULING | Facility: CLINIC | Age: 84
End: 2019-01-01

## 2019-01-01 ENCOUNTER — OFFICE VISIT - HEALTHEAST (OUTPATIENT)
Dept: ONCOLOGY | Facility: HOSPITAL | Age: 84
End: 2019-01-01

## 2019-01-01 ENCOUNTER — OFFICE VISIT - HEALTHEAST (OUTPATIENT)
Dept: INTERNAL MEDICINE | Facility: CLINIC | Age: 84
End: 2019-01-01

## 2019-01-01 ENCOUNTER — HOSPITAL ENCOUNTER (OUTPATIENT)
Dept: NEUROLOGY | Facility: HOSPITAL | Age: 84
Discharge: HOME | End: 2019-02-04
Attending: PSYCHIATRY & NEUROLOGY

## 2019-01-01 ENCOUNTER — COMMUNICATION - HEALTHEAST (OUTPATIENT)
Dept: NURSING | Facility: CLINIC | Age: 84
End: 2019-01-01

## 2019-01-01 ENCOUNTER — AMBULATORY - HEALTHEAST (OUTPATIENT)
Dept: HOSPICE | Facility: HOSPICE | Age: 84
End: 2019-01-01

## 2019-01-01 ENCOUNTER — AMBULATORY - HEALTHEAST (OUTPATIENT)
Dept: LAB | Facility: CLINIC | Age: 84
End: 2019-01-01

## 2019-01-01 ENCOUNTER — HOSPITAL ENCOUNTER (OUTPATIENT)
Dept: CT IMAGING | Facility: HOSPITAL | Age: 84
Setting detail: RADIATION/ONCOLOGY SERIES
Discharge: STILL ADMITTED OR ADMINISTRATIVE | End: 2019-08-14
Attending: INTERNAL MEDICINE

## 2019-01-01 ENCOUNTER — HOSPITAL ENCOUNTER (OUTPATIENT)
Dept: CT IMAGING | Facility: HOSPITAL | Age: 84
Setting detail: RADIATION/ONCOLOGY SERIES
Discharge: STILL ADMITTED OR ADMINISTRATIVE | End: 2019-07-12
Attending: INTERNAL MEDICINE

## 2019-01-01 ENCOUNTER — HOSPITAL ENCOUNTER (OUTPATIENT)
Dept: CT IMAGING | Facility: HOSPITAL | Age: 84
Setting detail: RADIATION/ONCOLOGY SERIES
Discharge: STILL ADMITTED OR ADMINISTRATIVE | End: 2019-04-16
Attending: INTERNAL MEDICINE

## 2019-01-01 DIAGNOSIS — R71.8 OTHER ABNORMALITY OF RED BLOOD CELLS: ICD-10-CM

## 2019-01-01 DIAGNOSIS — Z79.4 TYPE 2 DIABETES MELLITUS WITH HYPERGLYCEMIA, WITH LONG-TERM CURRENT USE OF INSULIN (H): ICD-10-CM

## 2019-01-01 DIAGNOSIS — R79.1 ABNORMAL COAGULATION PROFILE: ICD-10-CM

## 2019-01-01 DIAGNOSIS — I48.0 PAROXYSMAL ATRIAL FIBRILLATION WITH RAPID VENTRICULAR RESPONSE (H): ICD-10-CM

## 2019-01-01 DIAGNOSIS — R41.89 COGNITIVE DECLINE: ICD-10-CM

## 2019-01-01 DIAGNOSIS — I13.10: ICD-10-CM

## 2019-01-01 DIAGNOSIS — C34.11 MALIGNANT NEOPLASM OF UPPER LOBE BRONCHUS, RIGHT (H): ICD-10-CM

## 2019-01-01 DIAGNOSIS — R48.2 APRAXIA: ICD-10-CM

## 2019-01-01 DIAGNOSIS — R73.01 ELEVATED FASTING BLOOD SUGAR: ICD-10-CM

## 2019-01-01 DIAGNOSIS — Z51.81 ENCOUNTER FOR MONITORING COUMADIN THERAPY: ICD-10-CM

## 2019-01-01 DIAGNOSIS — R94.39 ABNORMAL NUCLEAR STRESS TEST: ICD-10-CM

## 2019-01-01 DIAGNOSIS — R30.0 DYSURIA: ICD-10-CM

## 2019-01-01 DIAGNOSIS — F51.01 PRIMARY INSOMNIA: ICD-10-CM

## 2019-01-01 DIAGNOSIS — R41.3 MEMORY LOSS: ICD-10-CM

## 2019-01-01 DIAGNOSIS — R27.8 OTHER LACK OF COORDINATION: ICD-10-CM

## 2019-01-01 DIAGNOSIS — M79.609 PAIN IN EXTREMITY: ICD-10-CM

## 2019-01-01 DIAGNOSIS — I10 ESSENTIAL HYPERTENSION WITH GOAL BLOOD PRESSURE LESS THAN 140/90: ICD-10-CM

## 2019-01-01 DIAGNOSIS — Z79.01 ENCOUNTER FOR MONITORING COUMADIN THERAPY: ICD-10-CM

## 2019-01-01 DIAGNOSIS — M62.81 GENERALIZED MUSCLE WEAKNESS: ICD-10-CM

## 2019-01-01 DIAGNOSIS — E11.9 DIABETES MELLITUS (H): ICD-10-CM

## 2019-01-01 DIAGNOSIS — E83.52 HYPERCALCEMIA: ICD-10-CM

## 2019-01-01 DIAGNOSIS — J90 PLEURAL EFFUSION: ICD-10-CM

## 2019-01-01 DIAGNOSIS — E11.65 TYPE 2 DIABETES MELLITUS WITH HYPERGLYCEMIA, WITH LONG-TERM CURRENT USE OF INSULIN (H): ICD-10-CM

## 2019-01-01 DIAGNOSIS — N17.9 ACUTE KIDNEY FAILURE, UNSPECIFIED (H): ICD-10-CM

## 2019-01-01 DIAGNOSIS — I25.9 ISCHEMIC HEART DISEASE: ICD-10-CM

## 2019-01-01 DIAGNOSIS — R35.0 FREQUENCY OF URINATION: ICD-10-CM

## 2019-01-01 DIAGNOSIS — N18.30 CHRONIC KIDNEY DISEASE, STAGE III (MODERATE) (H): ICD-10-CM

## 2019-01-01 DIAGNOSIS — J84.10 POSTINFLAMMATORY PULMONARY FIBROSIS (H): ICD-10-CM

## 2019-01-01 DIAGNOSIS — C34.90 LUNG CANCER (H): ICD-10-CM

## 2019-01-01 DIAGNOSIS — R11.2 NAUSEA WITH VOMITING: ICD-10-CM

## 2019-01-01 DIAGNOSIS — R41.82 ALTERED MENTAL STATUS: ICD-10-CM

## 2019-01-01 DIAGNOSIS — I82.409 DVT (DEEP VENOUS THROMBOSIS) (H): ICD-10-CM

## 2019-01-01 DIAGNOSIS — I10 ESSENTIAL HYPERTENSION, BENIGN: ICD-10-CM

## 2019-01-01 DIAGNOSIS — E78.5 HYPERLIPIDEMIA: ICD-10-CM

## 2019-01-01 DIAGNOSIS — R80.9 PROTEINURIA: ICD-10-CM

## 2019-01-01 DIAGNOSIS — D64.9 ANEMIA: ICD-10-CM

## 2019-01-01 DIAGNOSIS — N10 ACUTE INTERSTITIAL NEPHRITIS: ICD-10-CM

## 2019-01-01 DIAGNOSIS — E87.1 HYPONATREMIA: ICD-10-CM

## 2019-01-01 LAB
ALBUMIN SERPL-MCNC: 2.5 G/DL (ref 3.5–5)
ALBUMIN SERPL-MCNC: 2.8 G/DL (ref 3.5–5)
ALBUMIN SERPL-MCNC: 3 G/DL (ref 3.5–5)
ALBUMIN SERPL-MCNC: 3.1 G/DL (ref 3.5–5)
ALBUMIN SERPL-MCNC: 3.3 G/DL (ref 3.5–5)
ALBUMIN SERPL-MCNC: 3.3 G/DL (ref 3.5–5)
ALBUMIN SERPL-MCNC: 3.5 G/DL (ref 3.5–5)
ALBUMIN SERPL-MCNC: 3.5 G/DL (ref 3.5–5)
ALBUMIN UR-MCNC: ABNORMAL MG/DL
ALP SERPL-CCNC: 59 U/L (ref 45–120)
ALP SERPL-CCNC: 69 U/L (ref 45–120)
ALP SERPL-CCNC: 73 U/L (ref 45–120)
ALP SERPL-CCNC: 74 U/L (ref 45–120)
ALP SERPL-CCNC: 79 U/L (ref 45–120)
ALP SERPL-CCNC: 81 U/L (ref 45–120)
ALP SERPL-CCNC: 86 U/L (ref 45–120)
ALP SERPL-CCNC: 93 U/L (ref 45–120)
ALT SERPL W P-5'-P-CCNC: 14 U/L (ref 0–45)
ALT SERPL W P-5'-P-CCNC: 15 U/L (ref 0–45)
ALT SERPL W P-5'-P-CCNC: 15 U/L (ref 0–45)
ALT SERPL W P-5'-P-CCNC: 17 U/L (ref 0–45)
ALT SERPL W P-5'-P-CCNC: 17 U/L (ref 0–45)
ALT SERPL W P-5'-P-CCNC: 19 U/L (ref 0–45)
ALT SERPL W P-5'-P-CCNC: 19 U/L (ref 0–45)
ALT SERPL W P-5'-P-CCNC: 23 U/L (ref 0–45)
ANION GAP SERPL CALCULATED.3IONS-SCNC: 11 MMOL/L (ref 5–18)
ANION GAP SERPL CALCULATED.3IONS-SCNC: 12 MMOL/L (ref 5–18)
ANION GAP SERPL CALCULATED.3IONS-SCNC: 5 MMOL/L (ref 5–18)
ANION GAP SERPL CALCULATED.3IONS-SCNC: 6 MMOL/L (ref 5–18)
ANION GAP SERPL CALCULATED.3IONS-SCNC: 7 MMOL/L (ref 5–18)
ANION GAP SERPL CALCULATED.3IONS-SCNC: 7 MMOL/L (ref 5–18)
ANION GAP SERPL CALCULATED.3IONS-SCNC: 8 MMOL/L (ref 5–18)
ANION GAP SERPL CALCULATED.3IONS-SCNC: 8 MMOL/L (ref 5–18)
APPEARANCE UR: CLEAR
ARSENIC, WHOLE BLOOD: <10 NG/ML
AST SERPL W P-5'-P-CCNC: 17 U/L (ref 0–40)
AST SERPL W P-5'-P-CCNC: 21 U/L (ref 0–40)
AST SERPL W P-5'-P-CCNC: 23 U/L (ref 0–40)
AST SERPL W P-5'-P-CCNC: 23 U/L (ref 0–40)
AST SERPL W P-5'-P-CCNC: 24 U/L (ref 0–40)
AST SERPL W P-5'-P-CCNC: 25 U/L (ref 0–40)
AST SERPL W P-5'-P-CCNC: 25 U/L (ref 0–40)
AST SERPL W P-5'-P-CCNC: 29 U/L (ref 0–40)
BACTERIA #/AREA URNS HPF: ABNORMAL HPF
BASOPHILS # BLD AUTO: 0.1 THOU/UL (ref 0–0.2)
BASOPHILS # BLD AUTO: 0.2 THOU/UL (ref 0–0.2)
BASOPHILS NFR BLD AUTO: 1 % (ref 0–2)
BILIRUB DIRECT SERPL-MCNC: 0.2 MG/DL
BILIRUB SERPL-MCNC: 0.2 MG/DL (ref 0–1)
BILIRUB SERPL-MCNC: 0.3 MG/DL (ref 0–1)
BILIRUB SERPL-MCNC: 0.4 MG/DL (ref 0–1)
BILIRUB SERPL-MCNC: 0.4 MG/DL (ref 0–1)
BILIRUB SERPL-MCNC: 0.5 MG/DL (ref 0–1)
BILIRUB UR QL STRIP: NEGATIVE
BUN SERPL-MCNC: 27 MG/DL (ref 8–28)
BUN SERPL-MCNC: 30 MG/DL (ref 8–28)
BUN SERPL-MCNC: 31 MG/DL (ref 8–28)
BUN SERPL-MCNC: 34 MG/DL (ref 8–28)
BUN SERPL-MCNC: 34 MG/DL (ref 8–28)
BUN SERPL-MCNC: 36 MG/DL (ref 8–28)
BUN SERPL-MCNC: 39 MG/DL (ref 8–28)
BUN SERPL-MCNC: 42 MG/DL (ref 8–28)
CALCIUM SERPL-MCNC: 10 MG/DL (ref 8.5–10.5)
CALCIUM SERPL-MCNC: 10.1 MG/DL (ref 8.5–10.5)
CALCIUM SERPL-MCNC: 10.3 MG/DL (ref 8.5–10.5)
CALCIUM SERPL-MCNC: 10.5 MG/DL (ref 8.5–10.5)
CALCIUM SERPL-MCNC: 10.8 MG/DL (ref 8.5–10.5)
CALCIUM SERPL-MCNC: 11.1 MG/DL (ref 8.5–10.5)
CALCIUM SERPL-MCNC: 9.3 MG/DL (ref 8.5–10.5)
CALCIUM SERPL-MCNC: 9.9 MG/DL (ref 8.5–10.5)
CHLORIDE BLD-SCNC: 100 MMOL/L (ref 98–107)
CHLORIDE BLD-SCNC: 100 MMOL/L (ref 98–107)
CHLORIDE BLD-SCNC: 102 MMOL/L (ref 98–107)
CHLORIDE BLD-SCNC: 103 MMOL/L (ref 98–107)
CHLORIDE BLD-SCNC: 103 MMOL/L (ref 98–107)
CHLORIDE BLD-SCNC: 105 MMOL/L (ref 98–107)
CHLORIDE BLD-SCNC: 106 MMOL/L (ref 98–107)
CHLORIDE BLD-SCNC: 96 MMOL/L (ref 98–107)
CO2 SERPL-SCNC: 26 MMOL/L (ref 22–31)
CO2 SERPL-SCNC: 26 MMOL/L (ref 22–31)
CO2 SERPL-SCNC: 28 MMOL/L (ref 22–31)
CO2 SERPL-SCNC: 29 MMOL/L (ref 22–31)
CO2 SERPL-SCNC: 29 MMOL/L (ref 22–31)
CO2 SERPL-SCNC: 31 MMOL/L (ref 22–31)
CO2 SERPL-SCNC: 32 MMOL/L (ref 22–31)
CO2 SERPL-SCNC: 37 MMOL/L (ref 22–31)
COLOR UR AUTO: YELLOW
CREAT SERPL-MCNC: 1.17 MG/DL (ref 0.6–1.1)
CREAT SERPL-MCNC: 1.21 MG/DL (ref 0.6–1.1)
CREAT SERPL-MCNC: 1.34 MG/DL (ref 0.6–1.1)
CREAT SERPL-MCNC: 1.39 MG/DL (ref 0.6–1.1)
CREAT SERPL-MCNC: 1.44 MG/DL (ref 0.6–1.1)
CREAT SERPL-MCNC: 1.47 MG/DL (ref 0.6–1.1)
CREAT SERPL-MCNC: 1.58 MG/DL (ref 0.6–1.1)
CREAT SERPL-MCNC: 1.6 MG/DL (ref 0.6–1.1)
CREAT UR-MCNC: 94.8 MG/DL
EOSINOPHIL # BLD AUTO: 0.5 THOU/UL (ref 0–0.4)
EOSINOPHIL # BLD AUTO: 0.6 THOU/UL (ref 0–0.4)
EOSINOPHIL # BLD AUTO: 1.2 THOU/UL (ref 0–0.4)
EOSINOPHIL # BLD AUTO: 2.1 THOU/UL (ref 0–0.4)
EOSINOPHIL COUNT (ABSOLUTE): 2.5 THOU/UL (ref 0–0.4)
EOSINOPHIL NFR BLD AUTO: 11 % (ref 0–6)
EOSINOPHIL NFR BLD AUTO: 13 % (ref 0–6)
EOSINOPHIL NFR BLD AUTO: 14 % (ref 0–6)
EOSINOPHIL NFR BLD AUTO: 5 % (ref 0–6)
EOSINOPHIL NFR BLD AUTO: 7 % (ref 0–6)
ERYTHROCYTE [DISTWIDTH] IN BLOOD BY AUTOMATED COUNT: 12.8 % (ref 11–14.5)
ERYTHROCYTE [DISTWIDTH] IN BLOOD BY AUTOMATED COUNT: 14.8 % (ref 11–14.5)
ERYTHROCYTE [DISTWIDTH] IN BLOOD BY AUTOMATED COUNT: 15 % (ref 11–14.5)
ERYTHROCYTE [DISTWIDTH] IN BLOOD BY AUTOMATED COUNT: 15.2 % (ref 11–14.5)
ERYTHROCYTE [DISTWIDTH] IN BLOOD BY AUTOMATED COUNT: 15.8 % (ref 11–14.5)
ERYTHROCYTE [DISTWIDTH] IN BLOOD BY AUTOMATED COUNT: 16.1 % (ref 11–14.5)
ERYTHROCYTE [DISTWIDTH] IN BLOOD BY AUTOMATED COUNT: 16.3 % (ref 11–14.5)
FOLATE SERPL-MCNC: 14 NG/ML
GFR SERPL CREATININE-BSD FRML MDRD: 31 ML/MIN/1.73M2
GFR SERPL CREATININE-BSD FRML MDRD: 31 ML/MIN/1.73M2
GFR SERPL CREATININE-BSD FRML MDRD: 34 ML/MIN/1.73M2
GFR SERPL CREATININE-BSD FRML MDRD: 35 ML/MIN/1.73M2
GFR SERPL CREATININE-BSD FRML MDRD: 36 ML/MIN/1.73M2
GFR SERPL CREATININE-BSD FRML MDRD: 38 ML/MIN/1.73M2
GFR SERPL CREATININE-BSD FRML MDRD: 42 ML/MIN/1.73M2
GFR SERPL CREATININE-BSD FRML MDRD: 44 ML/MIN/1.73M2
GLUCOSE BLD-MCNC: 126 MG/DL (ref 70–125)
GLUCOSE BLD-MCNC: 155 MG/DL (ref 70–125)
GLUCOSE BLD-MCNC: 188 MG/DL (ref 70–125)
GLUCOSE BLD-MCNC: 235 MG/DL (ref 70–125)
GLUCOSE BLD-MCNC: 236 MG/DL (ref 70–125)
GLUCOSE BLD-MCNC: 255 MG/DL (ref 70–125)
GLUCOSE BLD-MCNC: 268 MG/DL (ref 70–125)
GLUCOSE BLD-MCNC: 391 MG/DL (ref 70–125)
GLUCOSE UR STRIP-MCNC: NEGATIVE MG/DL
HBA1C MFR BLD: 7.5 % (ref 3.5–6)
HBA1C MFR BLD: 8.6 % (ref 3.5–6)
HCT VFR BLD AUTO: 34.6 % (ref 35–47)
HCT VFR BLD AUTO: 35.8 % (ref 35–47)
HCT VFR BLD AUTO: 36.4 % (ref 35–47)
HCT VFR BLD AUTO: 36.4 % (ref 35–47)
HCT VFR BLD AUTO: 38.2 % (ref 35–47)
HCT VFR BLD AUTO: 40.2 % (ref 35–47)
HCT VFR BLD AUTO: 42.6 % (ref 35–47)
HGB BLD-MCNC: 10.5 G/DL (ref 12–16)
HGB BLD-MCNC: 11.2 G/DL (ref 12–16)
HGB BLD-MCNC: 11.2 G/DL (ref 12–16)
HGB BLD-MCNC: 11.8 G/DL (ref 12–16)
HGB BLD-MCNC: 11.8 G/DL (ref 12–16)
HGB BLD-MCNC: 12.3 G/DL (ref 12–16)
HGB BLD-MCNC: 12.9 G/DL (ref 12–16)
HGB UR QL STRIP: ABNORMAL
INR PPP: 1.2 (ref 0.9–1.1)
INR PPP: 1.23 (ref 0.9–1.1)
INR PPP: 1.57 (ref 0.9–1.1)
INR PPP: 2.1 (ref 0.9–1.1)
INR PPP: 2.79 (ref 0.9–1.1)
INR PPP: 2.99 (ref 0.9–1.1)
INR PPP: 3.07 (ref 0.9–1.1)
INR PPP: 3.13 (ref 0.9–1.1)
INR PPP: 3.28 (ref 0.9–1.1)
INR PPP: 4.12 (ref 0.9–1.1)
INR PPP: 5.52 (ref 0.9–1.1)
INTERNATIONAL NORMALIZATION RATIO, POC - HISTORICAL: 3.6
KETONES UR STRIP-MCNC: NEGATIVE MG/DL
LAB SAMPLE TYPE: NORMAL
LAB STATE REPORTED TO: NORMAL
LDH SERPL L TO P-CCNC: 342 U/L (ref 125–220)
LEAD, WHOLE BLOOD - HISTORICAL: 2 UG/DL
LEUKOCYTE ESTERASE UR QL STRIP: NEGATIVE
LYMPHOCYTES # BLD AUTO: 0.6 THOU/UL (ref 0.8–4.4)
LYMPHOCYTES # BLD AUTO: 0.6 THOU/UL (ref 0.8–4.4)
LYMPHOCYTES # BLD AUTO: 0.7 THOU/UL (ref 0.8–4.4)
LYMPHOCYTES # BLD AUTO: 0.8 THOU/UL (ref 0.8–4.4)
LYMPHOCYTES # BLD AUTO: 1.1 THOU/UL (ref 0.8–4.4)
LYMPHOCYTES NFR BLD AUTO: 3 % (ref 20–40)
LYMPHOCYTES NFR BLD AUTO: 6 % (ref 20–40)
LYMPHOCYTES NFR BLD AUTO: 7 % (ref 20–40)
LYMPHOCYTES NFR BLD AUTO: 8 % (ref 20–40)
LYMPHOCYTES NFR BLD AUTO: 8 % (ref 20–40)
MCH RBC QN AUTO: 26.5 PG (ref 27–34)
MCH RBC QN AUTO: 26.6 PG (ref 27–34)
MCH RBC QN AUTO: 26.7 PG (ref 27–34)
MCH RBC QN AUTO: 26.9 PG (ref 27–34)
MCH RBC QN AUTO: 27.4 PG (ref 27–34)
MCH RBC QN AUTO: 27.4 PG (ref 27–34)
MCH RBC QN AUTO: 27.7 PG (ref 27–34)
MCHC RBC AUTO-ENTMCNC: 30.3 G/DL (ref 32–36)
MCHC RBC AUTO-ENTMCNC: 30.3 G/DL (ref 32–36)
MCHC RBC AUTO-ENTMCNC: 30.6 G/DL (ref 32–36)
MCHC RBC AUTO-ENTMCNC: 30.8 G/DL (ref 32–36)
MCHC RBC AUTO-ENTMCNC: 30.9 G/DL (ref 32–36)
MCHC RBC AUTO-ENTMCNC: 31.3 G/DL (ref 32–36)
MCHC RBC AUTO-ENTMCNC: 32.5 G/DL (ref 32–36)
MCV RBC AUTO: 84 FL (ref 80–100)
MCV RBC AUTO: 87 FL (ref 80–100)
MCV RBC AUTO: 87 FL (ref 80–100)
MCV RBC AUTO: 88 FL (ref 80–100)
MCV RBC AUTO: 88 FL (ref 80–100)
MCV RBC AUTO: 89 FL (ref 80–100)
MCV RBC AUTO: 89 FL (ref 80–100)
MERCURY, WHOLE BLOOD - HISTORICAL: <5 NG/ML
METHYLMALONATE SERPL-SCNC: 0.22 UMOL/L (ref 0–0.4)
MICROALBUMIN UR-MCNC: 3.99 MG/DL (ref 0–1.99)
MICROALBUMIN/CREAT UR: 42.1 MG/G
MONOCYTES # BLD AUTO: 0.8 THOU/UL (ref 0–0.9)
MONOCYTES # BLD AUTO: 1 THOU/UL (ref 0–0.9)
MONOCYTES # BLD AUTO: 1 THOU/UL (ref 0–0.9)
MONOCYTES # BLD AUTO: 1.1 THOU/UL (ref 0–0.9)
MONOCYTES # BLD AUTO: 2 THOU/UL (ref 0–0.9)
MONOCYTES NFR BLD AUTO: 10 % (ref 2–10)
MONOCYTES NFR BLD AUTO: 11 % (ref 2–10)
MONOCYTES NFR BLD AUTO: 16 % (ref 2–10)
MONOCYTES NFR BLD AUTO: 4 % (ref 2–10)
MONOCYTES NFR BLD AUTO: 8 % (ref 2–10)
NEUTROPHILS # BLD AUTO: 14.2 THOU/UL (ref 2–7.7)
NEUTROPHILS # BLD AUTO: 4.8 THOU/UL (ref 2–7.7)
NEUTROPHILS # BLD AUTO: 5.7 THOU/UL (ref 2–7.7)
NEUTROPHILS # BLD AUTO: 9.2 THOU/UL (ref 2–7.7)
NEUTROPHILS NFR BLD AUTO: 66 % (ref 50–70)
NEUTROPHILS NFR BLD AUTO: 68 % (ref 50–70)
NEUTROPHILS NFR BLD AUTO: 73 % (ref 50–70)
NEUTROPHILS NFR BLD AUTO: 79 % (ref 50–70)
NITRATE UR QL: NEGATIVE
PH UR STRIP: 6 [PH] (ref 5–8)
PLAT MORPH BLD: NORMAL
PLATELET # BLD AUTO: 235 THOU/UL (ref 140–440)
PLATELET # BLD AUTO: 240 THOU/UL (ref 140–440)
PLATELET # BLD AUTO: 257 THOU/UL (ref 140–440)
PLATELET # BLD AUTO: 271 THOU/UL (ref 140–440)
PLATELET # BLD AUTO: 272 THOU/UL (ref 140–440)
PLATELET # BLD AUTO: 290 THOU/UL (ref 140–440)
PLATELET # BLD AUTO: 321 THOU/UL (ref 140–440)
PMV BLD AUTO: 10 FL (ref 8.5–12.5)
PMV BLD AUTO: 10.5 FL (ref 8.5–12.5)
PMV BLD AUTO: 10.5 FL (ref 8.5–12.5)
PMV BLD AUTO: 10.6 FL (ref 8.5–12.5)
PMV BLD AUTO: 11 FL (ref 8.5–12.5)
PMV BLD AUTO: 11.2 FL (ref 8.5–12.5)
PMV BLD AUTO: 7.4 FL (ref 7–10)
POTASSIUM BLD-SCNC: 4 MMOL/L (ref 3.5–5)
POTASSIUM BLD-SCNC: 4.1 MMOL/L (ref 3.5–5)
POTASSIUM BLD-SCNC: 4.3 MMOL/L (ref 3.5–5)
POTASSIUM BLD-SCNC: 4.4 MMOL/L (ref 3.5–5)
POTASSIUM BLD-SCNC: 4.4 MMOL/L (ref 3.5–5)
POTASSIUM BLD-SCNC: 4.7 MMOL/L (ref 3.5–5)
POTASSIUM BLD-SCNC: 4.9 MMOL/L (ref 3.5–5)
POTASSIUM BLD-SCNC: 5 MMOL/L (ref 3.5–5)
PROT SERPL-MCNC: 5.7 G/DL (ref 6–8)
PROT SERPL-MCNC: 6.1 G/DL (ref 6–8)
PROT SERPL-MCNC: 6.2 G/DL (ref 6–8)
PROT SERPL-MCNC: 6.2 G/DL (ref 6–8)
PROT SERPL-MCNC: 6.7 G/DL (ref 6–8)
PROT SERPL-MCNC: 6.7 G/DL (ref 6–8)
PROT SERPL-MCNC: 6.8 G/DL (ref 6–8)
PROT SERPL-MCNC: 6.9 G/DL (ref 6–8)
PTH-INTACT SERPL-MCNC: 21 PG/ML (ref 10–86)
RBC # BLD AUTO: 3.91 MILL/UL (ref 3.8–5.4)
RBC # BLD AUTO: 4.05 MILL/UL (ref 3.8–5.4)
RBC # BLD AUTO: 4.21 MILL/UL (ref 3.8–5.4)
RBC # BLD AUTO: 4.31 MILL/UL (ref 3.8–5.4)
RBC # BLD AUTO: 4.32 MILL/UL (ref 3.8–5.4)
RBC # BLD AUTO: 4.61 MILL/UL (ref 3.8–5.4)
RBC # BLD AUTO: 4.86 MILL/UL (ref 3.8–5.4)
RBC #/AREA URNS AUTO: ABNORMAL HPF
SODIUM SERPL-SCNC: 138 MMOL/L (ref 136–145)
SODIUM SERPL-SCNC: 138 MMOL/L (ref 136–145)
SODIUM SERPL-SCNC: 139 MMOL/L (ref 136–145)
SODIUM SERPL-SCNC: 140 MMOL/L (ref 136–145)
SODIUM SERPL-SCNC: 141 MMOL/L (ref 136–145)
SODIUM SERPL-SCNC: 141 MMOL/L (ref 136–145)
SP GR UR STRIP: 1.02 (ref 1–1.03)
SPECIMEN STATUS: NORMAL
SQUAMOUS #/AREA URNS AUTO: ABNORMAL LPF
T PALLIDUM AB SER QL: NEGATIVE
TOTAL NEUTROPHILS-ABS(DIFF): 14.9 THOU/UL (ref 2–7.7)
TOTAL NEUTROPHILS-REL(DIFF): 79 % (ref 50–70)
UROBILINOGEN UR STRIP-ACNC: ABNORMAL
VIT B12 SERPL-MCNC: >2000 PG/ML (ref 213–816)
WBC #/AREA URNS AUTO: ABNORMAL HPF
WBC: 11.7 THOU/UL (ref 4–11)
WBC: 18.9 THOU/UL (ref 4–11)
WBC: 19.6 THOU/UL (ref 4–11)
WBC: 7 THOU/UL (ref 4–11)
WBC: 8 THOU/UL (ref 4–11)
WBC: 8.7 THOU/UL (ref 4–11)
WBC: 9.3 THOU/UL (ref 4–11)

## 2019-01-01 ASSESSMENT — MIFFLIN-ST. JEOR
SCORE: 861.81
SCORE: 866.51
SCORE: 864.53

## 2019-08-29 ENCOUNTER — HOME CARE/HOSPICE - HEALTHEAST (OUTPATIENT)
Dept: HOSPICE | Facility: HOSPICE | Age: 84
End: 2019-08-29

## 2019-09-04 ENCOUNTER — COMMUNICATION - HEALTHEAST (OUTPATIENT)
Dept: INTERNAL MEDICINE | Facility: CLINIC | Age: 84
End: 2019-09-04

## 2020-03-10 ENCOUNTER — HEALTH MAINTENANCE LETTER (OUTPATIENT)
Age: 85
End: 2020-03-10

## 2020-12-20 ENCOUNTER — HEALTH MAINTENANCE LETTER (OUTPATIENT)
Age: 85
End: 2020-12-20

## 2021-04-24 ENCOUNTER — HEALTH MAINTENANCE LETTER (OUTPATIENT)
Age: 86
End: 2021-04-24

## 2021-05-24 ENCOUNTER — RECORDS - HEALTHEAST (OUTPATIENT)
Dept: ADMINISTRATIVE | Facility: CLINIC | Age: 86
End: 2021-05-24

## 2021-05-25 ENCOUNTER — RECORDS - HEALTHEAST (OUTPATIENT)
Dept: ADMINISTRATIVE | Facility: CLINIC | Age: 86
End: 2021-05-25

## 2021-05-26 ENCOUNTER — RECORDS - HEALTHEAST (OUTPATIENT)
Dept: ADMINISTRATIVE | Facility: CLINIC | Age: 86
End: 2021-05-26

## 2021-05-26 NOTE — TELEPHONE ENCOUNTER
Refill Approved    Rx renewed per Medication Renewal Policy. Medication was last renewed on 6/19/18.    Deidra Stahl, [facility] Triage/Med Refill 3/23/2019     Requested Prescriptions   Pending Prescriptions Disp Refills     atorvastatin (LIPITOR) 40 MG tablet [Pharmacy Med Name: ATORVASTATIN 40 MG TABLET] 90 tablet 2     Sig: TAKE 1 TABLET BY MOUTH DAILY.    Statins Refill Protocol (Hmg CoA Reductase Inhibitors) Passed - 3/22/2019 10:46 AM       Passed - PCP or prescribing provider visit in past 12 months     Last office visit with prescriber/PCP: 10/26/2018 Marco Garcia MD OR same dept: 1/22/2019 Semaj Cohn DO OR same specialty: 1/22/2019 Semaj Cohn DO  Last physical: Visit date not found Last MTM visit: Visit date not found   Next visit within 3 mo: Visit date not found  Next physical within 3 mo: Visit date not found  Prescriber OR PCP: Marco Garcia MD  Last diagnosis associated with med order: 1. Hyperlipidemia  - atorvastatin (LIPITOR) 40 MG tablet [Pharmacy Med Name: ATORVASTATIN 40 MG TABLET]; TAKE 1 TABLET BY MOUTH DAILY.  Dispense: 90 tablet; Refill: 2    2. Ischemic heart disease  - atorvastatin (LIPITOR) 40 MG tablet [Pharmacy Med Name: ATORVASTATIN 40 MG TABLET]; TAKE 1 TABLET BY MOUTH DAILY.  Dispense: 90 tablet; Refill: 2    If protocol passes may refill for 12 months if within 3 months of last provider visit (or a total of 15 months).

## 2021-05-27 ENCOUNTER — RECORDS - HEALTHEAST (OUTPATIENT)
Dept: ADMINISTRATIVE | Facility: CLINIC | Age: 86
End: 2021-05-27

## 2021-05-27 NOTE — PROGRESS NOTES
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

## 2021-05-27 NOTE — PROGRESS NOTES
Creatinine result has been faxed to Dr Wesley Perez per Dr Evans's request to [phone].    Francisca Ortiz RN

## 2021-05-27 NOTE — PATIENT INSTRUCTIONS - HE
1.  Continue Donepezil    2.  I will notify you of test results    3.  Error to higher rather than lower blood sugars    4.  See in 3 to 4 months

## 2021-05-27 NOTE — TELEPHONE ENCOUNTER
Who is calling:  patient  Reason for Call:  Wanted pcp to know she is still not feeling any better  Date of last appointment with primary care: 4/1/19  Okay to leave a detailed message: Yes

## 2021-05-27 NOTE — PROGRESS NOTES
ASSESSMENT:  1. Memory loss  Blanca has noted problems with intermittent confusion.  This has been attributed to chemotherapy.  She feels it id related to current medications.  She was started on Aricept and seems to be tolerating it well without any GI upset.  She is back home after staying with her daughter.  She states she has no difficulties with activities of daily living or remembering her medications  - donepezil (ARICEPT) 10 MG tablet; Take 1 tablet (10 mg total) by mouth at bedtime.  Dispense: 30 tablet; Refill: 11    2. Type 2 diabetes mellitus with hyperglycemia, with long-term current use of insulin (H)  Globin A1c was 8.7 when checked in October.  It is down to 7.5 today.  She had one blood sugar down into the 50s.  She denies symptomatic hypoglycemia.  Given her multiple medical issues, avoidance of hypoglycemia takes precedence over extremely tight diabetic control.  - Microalbumin, Random Urine  - Basic Metabolic Panel  - Glycosylated Hemoglobin A1c    3. Essential hypertension, benign  Blood pressure is good on current regimen consisting of lisinopril, amlodipine and metoprolol  - metoprolol succinate (TOPROL-XL) 50 MG 24 hr tablet; Take 1.5 tablets (75 mg total) by mouth at bedtime.  Dispense: 135 tablet; Refill: 3    4. Paroxysmal atrial fibrillation with rapid ventricular response (H)  She remains on warfarin.  She denies any problems with symptomatic palpitations.  Remains on metoprolol for rate control  - INR    5.  Metastatic lung cancer  Followed closely by Dr. Evans.  She has appeared to respond to current therapy.  A follow-up CT is scheduled for the near future    PLAN:  Patient Instructions   1.  Continue Donepezil    2.  I will notify you of test results    3.  Error to higher rather than lower blood sugars    4.  See in 3 to 4 months      Orders Placed This Encounter   Procedures     Microalbumin, Random Urine     Basic Metabolic Panel     Glycosylated Hemoglobin A1c     Medications  Discontinued During This Encounter   Medication Reason     metoprolol succinate (TOPROL-XL) 50 MG 24 hr tablet        Return in about 3 months (around 7/1/2019) for Recheck.      ASSESSED PROBLEMS:  1. Memory loss  donepezil (ARICEPT) 10 MG tablet   2. Type 2 diabetes mellitus with hyperglycemia, with long-term current use of insulin (H)  Microalbumin, Random Urine    Basic Metabolic Panel    Glycosylated Hemoglobin A1c   3. Essential hypertension, benign  metoprolol succinate (TOPROL-XL) 50 MG 24 hr tablet   4. Paroxysmal atrial fibrillation with rapid ventricular response (H)  INR       CHIEF COMPLAINT:  Chief Complaint   Patient presents with     Follow-up       HISTORY OF PRESENT ILLNESS:  Blanca is a 85 y.o. female presenting to the clinic today for medication monitoring with ongoing treatment for lung cancer, impaired cognitive function, DMII, and HTN. She is accompanied by her daughter.     Metastatic adenocarcinoma of the lung: 10/6/14 she was diagnosed with her cancer. She is doing quite well with it. She is taking her caner pills, but is taking them about as minimally as she is allowed, taking 1/2 the dose every other day. She was taking a higher dose in the past, and it led to mental forgetful ness, and lack of orientation. She follows with oncology.     Cognitive function: She is also taking donepezil to help with this. She has not taken it long enough to notice improvement, but she is not complaining as much today as yesterday or the day before. She is taking 10mg dose.     DMII: She has had some high blood sugars, but mostly is under control. She has had a couple issues of times with confusion, but when they check her blood sugar she is never low. She has stated that if she gets into the 90s she gets the shakes, but this has not happened lately. About the lowest she has had recently is 112 today before lunch. She is eating less lately.     HTN: Well controlled, 116/58 in office today. 136/58 on recheck.      REVIEW OF SYSTEMS:   Denies nausea/stomach upset,   Admits to occasional episodes of confusion, some dyspnea, high blood sugars,   All other systems are negative.    PFSH:  She has grand children and enjoys her time with them.   Her daughter is involved in her care.   Reviewed as below.     TOBACCO USE:  Social History     Tobacco Use   Smoking Status Former Smoker     Packs/day: 0.10     Years: 30.00     Pack years: 3.00     Last attempt to quit: 10/30/1980     Years since quittin.4   Smokeless Tobacco Never Used   Tobacco Comment    Not a steady smoker       VITALS:  Vitals:    19 1426 19 1502   BP: 116/58 136/58   Patient Site: Left Arm    Patient Position: Sitting    Cuff Size: Adult Regular    Pulse: 60    SpO2: 98%    Weight: 112 lb 7 oz (51 kg)    Height: 5' (1.524 m)      Wt Readings from Last 3 Encounters:   19 112 lb 7 oz (51 kg)   19 117 lb (53.1 kg)   19 114 lb 14.4 oz (52.1 kg)     Body mass index is 21.96 kg/m .    PHYSICAL EXAM:  Constitutional:   Reveals an alert pleasant slender woman, appears pale, affect appropriate, does not appear acutely ill.  136/58 on recheck. Vitals: per nursing notes.  HEENT: atraumatic, thinning hair, Ears:  External canals, TMs clear.    Eyes:  EOMs full, PERRL.  Oropharynx:   Mouth and throat clear, no thrush or exudate.  Neck:  Supple, no carotid bruits or adenopathy.  Back:  No spine or CVA pain.  Thorax:  No bony deformities.  Lungs: Clear to A&P without rales or wheezes.  Respiratory effort normal.  Cardiac:   Regular rate and rhythm, normal S1, S2, no murmur or gallop.  Abdomen:  Soft, active bowel sounds without bruits, mass, or tenderness.  Extremities:  No peripheral edema,     Skin:  Pale but clear, No jaundice, peripheral cyanosis or lesions to suggest malignancy.  Neuro:  Alert and oriented. No dysarthria or aphagia.  No gross focal deficits.  Psychiatric:  Memory intact, mood appropriate.    QUALITY MEASURES:  The  "following high BMI interventions were performed this visit: special diet education    ADDITIONAL HISTORY SUMMARIZED (2): 2/14/19 Dr. Evans oncology note reviewed for current management.   DECISION TO OBTAIN EXTRA INFORMATION (1): None.   RADIOLOGY TESTS (1): 2/12/19 CT chest /abdo/pelvis showed Very mild enlargement of a single anterior mediastinal lymph node and a single left periaortic retroperitoneal lymph node concerning for possible progression of , and previous liver lesion not clearly seen   LABS (1): 3/18/19, 2/28/19, 2/14/19 labs reviewed and ordered labs today.   MEDICINE TESTS (1): None.  INDEPENDENT REVIEW (2 each): None.     The visit lasted a total of 18 minutes face to face with the patient. Over 50% of the time was spent counseling and educating the patient about lung cancer treatment, appetite and diet, HTN, and DMII.    IKamar, am scribing for and in the presence of, Dr. Garcia.    I, Marco Garcia, personally performed the services described in this documentation, as scribed by Kamar Ordaz in my presence, and it is both accurate and complete.    Dragon dictation was used for this note.  Speech recognition errors are a possibility.    MEDICATIONS:  Current Outpatient Medications   Medication Sig Dispense Refill     amLODIPine (NORVASC) 5 MG tablet Take 1 tablet (5 mg total) by mouth daily. 90 tablet 3     atorvastatin (LIPITOR) 40 MG tablet TAKE 1 TABLET BY MOUTH DAILY. 90 tablet 2     BASAGLAR KWIKPEN U-100 INSULIN 100 unit/mL (3 mL) pen INJECT 6 UNITS UNDER THE SKIN AT BEDTIME. 15 adj dose pen 0     BD ULTRA-FINE JOHN PEN NEEDLE 32 gauge x 5/32\" Ndle USE 4 TIMES DAILY AS DIRECTED WITH INSULIN 400 each 2     blood glucose test (CONTOUR NEXT TEST STRIPS) strips Use 1 each As Directed 5 (five) times a day. Dispense brand per patient's insurance at pharmacy discretion. 450 strip 7     dabrafenib 75 mg cap Take 150 mg by mouth every other day. 60 capsule 3     INSULIN ASPART (NOVOLOG " FLEXPEN SUBQ) Inject 0-8 Units under the skin 3 (three) times a day before meals. Dose per sliding scale  BG less than 150 = 0 units  151-200 = 4 units  201- 250 = 6 units  > 251 = 8 units       insulin aspart U-100 (NOVOLOG FLEXPEN U-100 INSULIN) 100 unit/mL injection pen Inject 4 Units under the skin 3 (three) times a day before meals. 15 mL 1     isosorbide mononitrate (IMDUR) 30 MG 24 hr tablet TAKE 1 TABLET (30 MG TOTAL) BY MOUTH DAILY. 90 tablet 1     lisinopril (PRINIVIL,ZESTRIL) 10 MG tablet Take 1 tablet (10 mg total) by mouth daily. 90 tablet 3     LORazepam (ATIVAN) 1 MG tablet Take 1 mg by mouth as needed.  0     metoprolol succinate (TOPROL-XL) 50 MG 24 hr tablet Take 1.5 tablets (75 mg total) by mouth at bedtime. 135 tablet 3     multivitamin (ONE A DAY) per tablet Take 1 tablet by mouth daily.       nitroglycerin (NITROSTAT) 0.4 MG SL tablet Place 1 tablet (0.4 mg total) under the tongue every 5 (five) minutes as needed for chest pain. 20 tablet 1     ranitidine (ZANTAC) 150 MG tablet TAKE 1 TAB BY MOUTH TWICE DAILY. 180 tablet 3     trametinib (MEKINIST) 2 mg Tab Take 2 mg by mouth every other day. 30 tablet 3     warfarin (COUMADIN/JANTOVEN) 4 MG tablet 4 mg daily, dose adjusted per INR (Patient taking differently: 4 mg  alt with 5 mg      ) 90 tablet 3     donepezil (ARICEPT) 10 MG tablet Take 1 tablet (10 mg total) by mouth at bedtime. 30 tablet 11     No current facility-administered medications for this visit.        Total data points: 4

## 2021-05-27 NOTE — PROGRESS NOTES
[facility] Hematology and Oncology Progress Note    Patient: Blanca Oreilly  MRN: [id number]  Date of Service: 4/18/2019           Reason for visit      1. Malignant neoplasm of upper lobe bronchus, right, PD-L1 and [id number] mutation positive.    NSCLC; EGFR and ALK-1 negative. PD-L1 strongly positive for both 22C3, 28-8    Assessment     1. Very pleasant 85 y.o. woman with stage IV NSCLC, adenocarcinoma. Negative for EGFR  And ALK-1.  PD-L1 she is strongly positive.  On strata testing she has BRA [id number] mutation.  November 2017 PET scan suggestive of liver metastases as well as mediastinal lymph node metastases.  She was prescribed and took few days of dabrafenib and Trimetinib in the second week of December 2017.  Stopped after few days.  Surprisingly her PET scan in January 2018 showed very good response.  CT scan in the beginning of April showed progression.  She resumed dabrafenib and Trimetinib.  Took them for a few days.  Then stopped but in April 2018 CT scan again shows that she has responded to the medication. So in May 2018 she went  back on treatment with dabrafenib and trimatinib but only one dose of each once a week.  In June 2018 frequency increased to 1 dose of each pill every 6 day.  Then in July 2018 she increase the frequency of taking one dose of each medication every fifth day.  Then she went to half the dose of Dabarafenib (75mg two times a day ) and full dose of Trametinib (2mg daily).  November 2018 scan showed stable to mild improvement in the mediastinal adenopathy.  She stopped dabrafenib and Trimetinib in January 2019 due to memory issues.  CT scan at that time showed that she may have slight progression in one mediastinal lymph node.  Convinced her to start taking it again and she started taking it again.  Comes in today after another CT scan which shows stable disease.  2.  History of prior treatment include 4 cycles of carboplatin, taxol and avastin. Then was on Alimta and  Avastin.  Later on she has had repeat treatment with 4 cycles Carboplatin and Taxol. PET scan showed good response.  Started on Pembrolizumab but after 4 cycles there was some progression.  She then tried and progressed on Opdivo.  Then she was on Abraxane from November 2016 until April 2017. PET scan showed complete response. Stopped due to side effects. Her May 2017 PET scan showed progression.  She was on Atezulizumab from end of May beginning of October 2017. PET scan in November 2017 showed worsening of the liver metastases.  Was on dabrafenib and Trimetinib since December 2017.  3. History of gastric ulcer pathology is consistent with non-small cell lung cancer.  Clinically she seems to be doing well on that her hemoglobin is stable and she has not had any more blood in her stools.  4. Elevated creatinine.  5. History of DVT for which she is on anticoagulation.  INR is supratherapeutic.  6. Anxiety and depression.  Seems to be better.  7. History of momentary blindness which she is concerned might be due to dabrafenib and try imatinib.  8. Now having significant memory issues.  Has been seen by neurologist.  Currently on Aricept and very happy with the results.    Plan     1. At this time I would recommend continuing treatment with dabrafenib and trimatinib at her current dose which is a reduced dose.  2. RTC in 3-4 months with a scan.    3. Continue anticoagulation per plan.  Advised to increase Coumadin to 4 mg every day.  Check INR in a month's time.  4. Good diet and exercise.    Clinical stage      Lung cancer, Right side    Primary site: Lung (Right)    Staging method: AJCC 7th Edition    Clinical: Stage IV (T4, N0, M1a) signed by Jemal Evans MD on 10/20/2014  2:19 PM    Summary: Stage IV (T4, N0, M1a)      History     Blanca Oreilly is a very pleasant 85 y.o. old female with a history of  non-small cell lung cancer located in the right lung measuring 5.7 cm in size presenting with some shortness  of breath associated with malignant pleural effusion in the month of October 2014.  Pleural tap was positive for malignant cells adenocarcinoma in nature.  CK 7 positive CK 20 negative TTF-1 positive.  Subsequently that she was admitted due to worsening shortness of breath and her pleural effusion was tapped . She had Pleurx catheter placed but then removed as pleural fluid has reduced to few cc per week.   Her PET scan done at [facility] showed disease limited to thorax only. She has started on chemotherapy with carboplatin and Taxol with avastin. Has had 4 doses. Her PET scan showed nearly complete response. So we started her on Alimta and Avastin. She started that in February 2015.    She had CT in September 2015 which showed some worsening of subcrinal LN. Then had pet scan. That shows progression. Started back on Carboplatin and Taxol. We did give her Avastin but then we had to hold it due to protienuria. CT scan after 2 cycles showed improvement. PET scan after 4 showed partial response. She was tested for PD-L1 and was positive for both opdivo and Pembrolizumab. So we started on Pembrolizumab in late January 2016.  However PET scan after 3 cycles showed progression. So in April we started on Opdivo.    Was in hospital in the beginning of September 2016 with GI bleed and was found to have gastric ulcer.  She was started on PPI. Coumadin stopped.  In October 2016 her PET scan  showed progression.  Opdivo has been stopped.  Her PET scan also showed some fullness on her kidney.  Her creatinine had gone up.  She was then admitted and started on some IV fluids and seen by nephrology.  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  High doses of steroids actually give her diabetes.  In the meantime she also lost her  was battling myelodysplastic syndrome.  She had a follow-up endoscopy for the gastric ulcer and that  came back positive for adenocarcinoma consistent with lung primary.  She has been started on salvage chemotherapy with single agent Abraxane.  She had 1 cycle in November.  She is here for her next cycle.    She is managing her diabetes well.  Dr. Mayo started her on insulin.  Her blood sugars are getting better.    Tolerated Abraxane well. The scan after 3 cycles showed significant improvement in the mediastinal as well as abdominal lymphadenopathy.   She did have an abnormal stress test for which she ended up being in the hospital.  A reversible ischemic defect was noted.  She has been put on nitrates.  She is otherwise doing well.  She has not had any EGD since starting her chemotherapy.  She had PET scan in March which actually showed complete response.    She continued Abraxane however her neuropathy seems to have gotten worse.  We stopped treatment in April 2017.     PET scan in May 2017 showed progression. I recommended trying Tecentriq which she started end of May 2017.   PET scan in August 2017 showed partial response.  However in late September 2017 she started to have elevation of her liver function test which required me to stop her treatment.  In spite of high dose of prednisone her liver function test continue to get worse.  She had PET scan done November 2017 which showed that she had developed new liver metastases.      Due to the fact that her tumor molecular profiling on strata testing showed that she had BRAF   mutation I prescribed dabrafenib and Trimetinib.  She started those medications sometime in the first of the second week of December 2017. She was diagnosed to have a DVT in her left calf on December 7, 2017.  She has been started on anticoagulation.  She eventually was unable to handle those medications and stopped them I believe 12 December 2017.  Her PET scan in January 2018 actually showed that she had significant improvement in her disease which was surprising.  In my opinion  it most likely represented delayed response to TECCENTRIQ or some spontaneous regression.  My impression at that time was that it is unlikely that she responded to few pills of Trimetinib and dabrafenib.    Then in the beginning of April her CT scan showed progression mostly in the lymph nodes of the mediastinum and upper abdomen.  We discussed treatment options and interventions.  We discussed going to TECCENTRIQ or resuming dabrafenib and Trimetinib.  She eventually started with the oral pills.  She took them for about a week or so the last dose was sometime on 15 April 2018.  She was admitted at [facility] with fever which were felt to be secondary to these medications.    In May 2018 she was recommended to go back on treatment with dabrafenib and trimatinib but only one dose of each once a week. She is able to take it.  Then we decreased the time between the doses to every 6 day in middle of June 2018.  Then in August 2018 she increased it to half the dose of Dabarafenib (75mg two times a day ) and full dose of Trametinib (2mg daily).  She has been handling it well.  Her scan in November 2018 showed stable disease.    She comes in today for follow-up.  Since her last visit here in November 2018 she has had several issues with memory.  She has significant memory loss and recollection issues.  She has been seen by a neurologist.  She also had an MRI done which was negative for any metastatic disease.  She has been prescribed Namenda for memory issues.  She did stop taking her dabrafenib and Trimetinib in the last week of January 2019 as she felt that they might be causing her memory issue. Her CT showed slight progression. She is doing OK after restarting and she is also taking Aricept. Very happy with Aricept.      Has seen  for her kidney function. Otherwise she is doing well.      Past Medical History     Past Medical History:   Diagnosis Date     A-fib (H)      Anemia      Cancer of lung (H)       Carotid stenosis      Chronic kidney disease      CKD (chronic kidney disease)      Coronary artery disease      Diabetes mellitus (H) 11/1/2016     Diabetes mellitus, type II (H)      Exacerbation of asthma 4/4/2007     High cholesterol      HTN (hypertension)      Hyperlipidemia      Hypertension      Peripheral neuropathy      Pleural effusion      Primary lung adenocarcinoma (H)     Stage IV NSCLC     Upper GI bleed      Review of Systems   Constitutional   Denies any complaints.  Neurosensory     Cardiovascular     Pulmonary     Gastrointestinal     Genitourinary     Integumentary     Patient Coping  Patient Coping: Accepting  ECOG Performance   1  Pain Status  Currently in Pain: No/denies  Accompanied by  Accompanied by: Family Member  Constitutional  Constitutional (WDL): All constitutional elements are within defined limits  Fatigue: No Concerns  Fever: No Concerns  Chills: No Concerns  Weight Gain: No Concerns  Weight Loss: No Concerns  Hot flashes/Night Sweats: No Concerns  Neurosensory  Neurosensory (WDL): Exceptions to WDL  Peripheral Motor Neuropathy: Concerns(worsening in feet)  Ataxia: No Concerns  Peripheral Sensory Neuropathy: No Concerns  Confusion: No Concerns  Dizziness: No Concerns  Syncope: No Concerns  Eye   Eye Disorder (WDL): All eye disorder elements are within defined limits(glasses)  Blurred Vision: No Concerns  Dry Eye: No Concerns  Eye Pain: No Concerns  Watering Eyes: No Concerns  Ear  Ear Disorder (WDL): All ear disorder elements are within defined limits  Ear Pain: No Concerns  Tinnitus: No Concerns  Cardiovascular  Cardiovascular (WDL): All cardiovascular elements are within defined limits  Palpitations: No Concerns  Edema: No Concerns  SVT, DVT/PE: No Concerns  Chest Pain - Cardiac: No Concerns  Pulmonary  Respiratory (WDL): Exceptions to WDL  Cough: Concerns(dry)  Dyspnea: No Concerns  Hypoxia: No Concerns  Gastrointestinal  Gastrointestinal (WDL): All gastrointestinal  elements are within defined limits  Anorexia: No Concerns  Nausea: No Concerns  Vomiting: No Concerns  Dehydration: No Concerns  Dysgeusia: No Concerns  Dysphagia: No Concerns  Mucositis Oral: No Concerns  Esophagitis: No Concerns  Constipation: No Concerns  Diarrhea: No Concerns(using Imodium daily)  Pharyngitis: No Concerns  Dry Mouth: No Concerns  Genitourinary  Genitourinary (WDL): All genitourinary elements are within defined limits  Urinary Frequency: No Concerns  Urinary Retention: No Concerns  Urinary Tract Pain: No Concerns  Lymphatic  Lymph (WDL): All lymph disorder elements are within defined limits  Lymphedema: No Concerns  Lymph Node Discomfort: No Concerns  Musculoskeletal and Connective Tissue  Musculoskeletal and Connetive Tissue Disorders (WDL): All Musculoskeletal and Connetive Tissue Disorder elements are within defined limits  Arthralgia: No Concerns  Bone Pain: No Concerns  Muscle Weakness : No Concerns  Myalgia: No Concerns  Integumentary  Integumentary (WDL): All integumentary elements are within defined limits  Alopecia: No Concerns  Rash Maculo-Papular: No Concerns  Pruritus: No Concerns  Urticaria: No Concerns  Palmar-Plantar Erythrodysesthesia Syndrome: No Concerns  Flushing: No Concerns  Patient Coping  Patient Coping: Accepting  Accompanied by  Accompanied by: Family Member  Oral Chemo Adherence           Vital Signs     Vitals:    04/18/19 1043   BP: 132/59   Pulse: 62   SpO2: 95%       Physical Exam     GENERAL: no acute distress. Cooperative in conversation.   HEENT: Starting to have alopecia. pupils are equal, round and reactive. Oral mucosa is clean and intact. No ulcerations or mucositis noted. No bleeding noted.  RESP:Chest symmetric lungs are clear bilaterally per auscultation. Regular respiratory rate. No wheezes or rhonchi.    CV: Normal S1 S2 Regular, rate and rhythm.   ABD: Nondistended, soft, slight epigastric tenderness. Positive bowel sounds. No organomegaly.    EXTREMITIES: No lower extremity edema.  Noticing slight increased edema around the lower part of her ankles.  NEURO: non focal. Alert and oriented x3.  No obvious neuro deficit.  PSYCH: within normal limits. No depression or anxiety.  SKIN: warm dry intact .      Lab Results     Results for orders placed or performed in visit on 04/18/19   INR   Result Value Ref Range    INR 1.23 (H) 0.90 - 1.10   Comprehensive Metabolic Panel   Result Value Ref Range    Sodium 141 136 - 145 mmol/L    Potassium 4.1 3.5 - 5.0 mmol/L    Chloride 106 98 - 107 mmol/L    CO2 29 22 - 31 mmol/L    Anion Gap, Calculation 6 5 - 18 mmol/L    Glucose 155 (H) 70 - 125 mg/dL    BUN 31 (H) 8 - 28 mg/dL    Creatinine 1.44 (H) 0.60 - 1.10 mg/dL    GFR MDRD Af Amer 42 (L) >60 mL/min/1.73m2    GFR MDRD Non Af Amer 35 (L) >60 mL/min/1.73m2    Bilirubin, Total 0.3 0.0 - 1.0 mg/dL    Calcium 10.5 8.5 - 10.5 mg/dL    Protein, Total 6.7 6.0 - 8.0 g/dL    Albumin 3.5 3.5 - 5.0 g/dL    Alkaline Phosphatase 73 45 - 120 U/L    AST 25 0 - 40 U/L    ALT 17 0 - 45 U/L   HM1 (CBC with Diff)   Result Value Ref Range    WBC 7.0 4.0 - 11.0 thou/uL    RBC 4.21 3.80 - 5.40 mill/uL    Hemoglobin 11.2 (L) 12.0 - 16.0 g/dL    Hematocrit 36.4 35.0 - 47.0 %    MCV 87 80 - 100 fL    MCH 26.6 (L) 27.0 - 34.0 pg    MCHC 30.8 (L) 32.0 - 36.0 g/dL    RDW 16.3 (H) 11.0 - 14.5 %    Platelets 235 140 - 440 thou/uL    MPV 10.6 8.5 - 12.5 fL    Neutrophils % 68 50 - 70 %    Lymphocytes % 8 (L) 20 - 40 %    Monocytes % 16 (H) 2 - 10 %    Eosinophils % 7 (H) 0 - 6 %    Basophils % 1 0 - 2 %    Neutrophils Absolute 4.8 2.0 - 7.7 thou/uL    Lymphocytes Absolute 0.6 (L) 0.8 - 4.4 thou/uL    Monocytes Absolute 1.1 (H) 0.0 - 0.9 thou/uL    Eosinophils Absolute 0.5 (H) 0.0 - 0.4 thou/uL    Basophils Absolute 0.1 0.0 - 0.2 thou/uL     *Note: Due to a large number of results and/or encounters for the requested time period, some results have not been displayed. A complete set of results can  be found in Results Review.         Distress assessment and ECOG status      ECOG Performance:    ECOG Performance Status: 1    Distress Assessment  Distress Assessment Score: Extreme distress(CT results )       Imaging Results       Ct Chest Without Contrast    Result Date: 4/16/2019  EXAM: CT CHEST WO CONTRAST LOCATION: [facility] DATE/TIME: 4/16/2019 10:08 AM INDICATION: Neoplasm: chest, lung, rx monitor or f/u COMPARISON: 12/12/2019 TECHNIQUE: Helical images were obtained through the chest. Multiplanar reformats were obtained. Dose reduction techniques were used. IV CONTRAST: None. FINDINGS: LUNGS AND PLEURA: Stable 5 mm nodule right lower lobe image 86, stable 2 mm nodule left lower lobe image 52. The previous right middle lobe nodule appears unchanged but less well seen measuring 4 mm image 70. No new lung nodules. MEDIASTINUM: Increasing soft tissue fullness throughout the left hilum most suggestive of developing left hilar adenopathy but poorly characterized without the use of IV contrast. Slight decrease in size to a lymph node anterior mediastinum now 12 mm previously 15 mm. Slight increase in size to a left upper posterior mediastinal node adjacent to the esophagus measuring 11 mm, image 33, previously 6 mm. Slight increase in a Station 4L no measuring 11 mm on image 52 previously 6 mm. Subcarinal node mildly increased in size as well. LIMITED UPPER ABDOMEN: Negative. MUSCULOSKELETAL: Negative.     CONCLUSION: 1.  The previously noted tiny lung nodules are unchanged. 2.  Anterior mediastinal lymph node is slightly smaller but the middle and posterior mediastinal nodes are mildly enlarged and there appears to be some new left hilar adenopathy, difficult to define without the use of IV contrast, allergies and chronic kidney disease. 3.  No other changes.           Signed by: Jemal Evans MD

## 2021-05-27 NOTE — TELEPHONE ENCOUNTER
Patient Returning Call  Reason for call:  Returning phone call  Information relayed to patient:  Relayed message from Dr. Garcia  Patient has additional questions:  No  If YES, what are your questions/concerns:  n/a  Okay to leave a detailed message?: No call back needed

## 2021-05-28 ENCOUNTER — RECORDS - HEALTHEAST (OUTPATIENT)
Dept: ADMINISTRATIVE | Facility: CLINIC | Age: 86
End: 2021-05-28

## 2021-05-28 NOTE — TELEPHONE ENCOUNTER
Blanca calls again today regarding her left leg pain, see note dated 5/6/19, She states there is no redness or swelling and no injury.  Patient states heat helps the pain.  She states she is wrapping her leg.  Patient is not taking anything for her pain.  It was recommended for the patient to try tylenol.  Pt states she will try that.  Patient called back an hour later to let us know that there was some red patches above where the pain is.  Per Kay STORY, patient should contact her primary MD to see if she could be seen.  Patient had INR yesterday which was 3.07.

## 2021-05-28 NOTE — TELEPHONE ENCOUNTER
Blanca calls in today stating that she was having some left leg/knee pain yesterday.  She could feel a bump at that area as well.  She is elevating it, using heat and it seems to be much better today, less pain and the bump is gone.  She would feel more comfortable coming in to have her INR checked.  I told her to come on in and we will check it to see how she is doing.  She will be here at 1400.  She was appreciative.    Francisca Ortiz RN

## 2021-05-28 NOTE — TELEPHONE ENCOUNTER
"Patient calling stating \"i'm in cancer treatment and I am taking warfarin and my INR is quite high so that seems to be ok, but what I have is pain in my calf that started the day before yesterday\"    'Pain started the day before yesterday in left calf with a patchy red above where it hurts,it's not hot, I have taken 2 tylenol and it almost knocked me out\"    See assessment below    Patient has a history of DVT's    Per protocol due to prior history of clots, patient to be seen in the ER. Patient is agreeable with the plan, her daughter is with her and will take her to [facility] ED. Care advice given.    Reason for Disposition    History of prior 'blood clot' in leg or lungs (i.e., deep vein thrombosis, pulmonary embolism)    Answer Assessment - Initial Assessment Questions  1. ONSET: \"When did the pain start?\"       \"The day before yesterday\"  2. LOCATION: \"Where is the pain located?\"       Left calf  3. PAIN: \"How bad is the pain?\"    (Scale 1-10; or mild, moderate, severe)    -  MILD (1-3): doesn't interfere with normal activities     -  MODERATE (4-7): interferes with normal activities (e.g., work or school) or awakens from sleep, limping     -  SEVERE (8-10): excruciating pain, unable to do any normal activities, unable to walk      \"I don't feel any pain right now after 2 tylenol\"  4. WORK OR EXERCISE: \"Has there been any recent work or exercise that involved this part of the body?\"       No  5. CAUSE: \"What do you think is causing the leg pain?\"      Unknown  6. OTHER SYMPTOMS: \"Do you have any other symptoms?\" (e.g., chest pain, back pain, breathing difficulty, swelling, rash, fever, numbness, weakness)      \"back pain for the last month\" Red patch above the left calf, \"It's not where the pain is, it's the pain where it was the day before yesterday\"  7. PREGNANCY: \"Is there any chance you are pregnant?\" \"When was your last menstrual period?\"      NO    Protocols used: LEG PAIN-A-OH      "

## 2021-05-29 NOTE — TELEPHONE ENCOUNTER
Refill Approved    Rx renewed per Medication Renewal Policy. Medication was last renewed on 12/12/18.    Deidra Stahl, [facility] Triage/Med Refill 6/5/2019     Requested Prescriptions   Pending Prescriptions Disp Refills     isosorbide mononitrate (IMDUR) 30 MG 24 hr tablet [Pharmacy Med Name: ISOSORBIDE MONONIT ER 30 MG TB] 90 tablet 1     Sig: TAKE 1 TABLET BY MOUTH EVERY DAY       Isosorbide Refill Protocol Passed - 6/4/2019  1:04 PM        Passed - Visit with PCP or prescribing provider visit in last 6 months or next 3 months     Last office visit with prescriber/PCP: Visit date not found OR same dept: 4/1/2019 Marco Garcia MD OR same specialty: 4/1/2019 Marco Garcia MD Last physical: Visit date not found Last MTM visit: Visit date not found     Next appt within 3 mo: Visit date not found  Next physical within 3 mo: Visit date not found  Prescriber OR PCP: Mitch Mayo MD  Last diagnosis associated with med order: 1. Abnormal nuclear stress test  - isosorbide mononitrate (IMDUR) 30 MG 24 hr tablet [Pharmacy Med Name: ISOSORBIDE MONONIT ER 30 MG TB]; TAKE 1 TABLET BY MOUTH EVERY DAY  Dispense: 90 tablet; Refill: 1    If protocol passes may refill for 6 months if within 3 months of last provider visit (or a total of 9 months).              Passed - Blood pressure filed in past 12 months     BP Readings from Last 1 Encounters:   05/08/19 162/65

## 2021-05-29 NOTE — TELEPHONE ENCOUNTER
Medication Request  Medication name:    blood glucose test (CONTOUR NEXT TEST STRIPS) strips     Pharmacy Name and Location:[facility] [id number] [place] [address]     Reason for request: Medicare Part B WILL NOT cover testing 5xs daily .   A new Rx for the limit of 3XS Daily should be sent to Pharmacy as listed or patient will have to pay out of pocket .  Please revise and Advise Patient on changes or next best course of action.  Thank You     When did you use medication last?:  06/20/19    Patient offered appointment:  No     Okay to leave a detailed message: yes

## 2021-05-30 ENCOUNTER — RECORDS - HEALTHEAST (OUTPATIENT)
Dept: ADMINISTRATIVE | Facility: CLINIC | Age: 86
End: 2021-05-30

## 2021-05-30 VITALS — HEIGHT: 61 IN | WEIGHT: 118 LBS | BODY MASS INDEX: 22.28 KG/M2

## 2021-05-30 VITALS — BODY MASS INDEX: 22.6 KG/M2 | WEIGHT: 119.7 LBS | HEIGHT: 61 IN

## 2021-05-30 VITALS — WEIGHT: 119.9 LBS | BODY MASS INDEX: 22.65 KG/M2

## 2021-05-30 VITALS — BODY MASS INDEX: 22.37 KG/M2 | WEIGHT: 118.4 LBS

## 2021-05-30 VITALS — WEIGHT: 118.6 LBS | BODY MASS INDEX: 22.41 KG/M2

## 2021-05-30 VITALS — BODY MASS INDEX: 22.28 KG/M2 | HEIGHT: 61 IN | WEIGHT: 118 LBS

## 2021-05-30 VITALS — WEIGHT: 120.1 LBS | BODY MASS INDEX: 22.69 KG/M2

## 2021-05-30 VITALS — WEIGHT: 118.8 LBS | BODY MASS INDEX: 22.43 KG/M2 | HEIGHT: 61 IN

## 2021-05-30 VITALS — BODY MASS INDEX: 22.09 KG/M2 | HEIGHT: 61 IN | WEIGHT: 117 LBS

## 2021-05-30 VITALS — BODY MASS INDEX: 22.17 KG/M2 | WEIGHT: 117.31 LBS

## 2021-05-30 VITALS — HEIGHT: 61 IN | WEIGHT: 119.3 LBS | BODY MASS INDEX: 22.52 KG/M2

## 2021-05-30 VITALS — HEIGHT: 61 IN | WEIGHT: 119 LBS | BODY MASS INDEX: 22.47 KG/M2

## 2021-05-30 VITALS — BODY MASS INDEX: 22.16 KG/M2 | WEIGHT: 117.3 LBS

## 2021-05-30 VITALS — WEIGHT: 121.7 LBS | BODY MASS INDEX: 22.98 KG/M2 | HEIGHT: 61 IN

## 2021-05-30 NOTE — TELEPHONE ENCOUNTER
She was given the lowest dose of mirtazapine available.  I would monitor her symptoms without adding a new medication at this time

## 2021-05-30 NOTE — TELEPHONE ENCOUNTER
Patients daughter calls today and says patient will have oral surgery on 7/31/19 and her INR needs to be below 3.0.  Per Dr Evans, patient should HOLD her warfarin for three days and have INR checked day before procedure.  Pt daughter advised to hold warfarin Sun, Mon, Tues, check INR Tues, resume warfarin evening on day of procedure and recheck INR 1 week after resuming.  Pt daughter verbalized understanding and transferred to scheduling the make lab appt.

## 2021-05-30 NOTE — PROGRESS NOTES
Blanca came in today for a lab draw.  She had complaints of shortness of breath.  At rest she was at 93% without oxygen.  When Blanca was walking her oxygen level went down to 74%.  She walked 1/2 block.  With oxygen at 2 L at rest, her oxygen level went to 98%.  With walking on 2 L of oxygen, her sats went to 95%.  Blanca stated she felt so much better on the oxygen.    Blanca was given her lab results before she left with her daughter.

## 2021-05-30 NOTE — PROGRESS NOTES
[facility] Hematology and Oncology Progress Note    Patient: Blanca Oreilly  MRN: [id number]  Date of Service: 7/24/2019           Reason for visit      1. Malignant neoplasm of upper lobe bronchus, right, PD-L1 and [id number] mutation positive.    NSCLC; EGFR and ALK-1 negative. PD-L1 strongly positive for both 22C3, 28-8    Assessment     1. Very pleasant 85 y.o. woman with stage IV NSCLC, adenocarcinoma. Negative for EGFR  And ALK-1.  PD-L1 she is strongly positive.  On strata testing she has BRA [id number] mutation.  November 2017 PET scan suggestive of liver metastases as well as mediastinal lymph node metastases.  She was prescribed and took few days of dabrafenib and Trimetinib in the second week of December 2017.  Stopped after few days.  Surprisingly her PET scan in January 2018 showed very good response.  CT scan in the beginning of April showed progression.  She resumed dabrafenib and Trimetinib.  Took them for a few days.  Then stopped but in April 2018 CT scan again shows that she has responded to the medication. So in May 2018 she went  back on treatment with dabrafenib and trimatinib but only one dose of each once a week.  In June 2018 frequency increased to 1 dose of each pill every 6 day.  Then in July 2018 she increase the frequency of taking one dose of each medication every fifth day.  Then she went to half the dose of Dabarafenib (75mg two times a day ) and full dose of Trametinib (2mg daily).  November 2018 scan showed stable to mild improvement in the mediastinal adenopathy.  She stopped dabrafenib and Trimetinib in January 2019 due to memory issues.  CT scan at that time showed that she may have slight progression in one mediastinal lymph node.  Convinced her to start taking it again and she started taking it again.  CT scan in April 2019 showed slight improvement.  However her CT scan in July 2019 showed worsening.  Now back on full dose of Trimetinib half dose of dabrafenib.  Seems to be  tolerating okay.  2.  History of prior treatment include 4 cycles of carboplatin, taxol and avastin. Then was on Alimta and Avastin.  Later on she has had repeat treatment with 4 cycles Carboplatin and Taxol. PET scan showed good response.  Started on Pembrolizumab but after 4 cycles there was some progression.  She then tried and progressed on Opdivo.  Then she was on Abraxane from November 2016 until April 2017. PET scan showed complete response. Stopped due to side effects. Her May 2017 PET scan showed progression.  She was on Atezulizumab from end of May beginning of October 2017. PET scan in November 2017 showed worsening of the liver metastases.  Was on dabrafenib and Trimetinib since December 2017.  3. History of gastric ulcer pathology is consistent with non-small cell lung cancer.  Clinically she seems to be doing well on that her hemoglobin is stable and she has not had any more blood in her stools.  4. Elevated creatinine.  5. History of DVT for which she is on anticoagulation.    6. Anxiety and depression.      Plan     1. I again had a lengthy discussion with the patient and her daughter regarding her overall situation.  We again talked about hospice.  Today she is somewhat more receptive.  She is actually willing to consider hospice visit for informational purposes.  We will make that referral.  In the meantime she will continue dabrafenib 150 mg p.o. daily and Trimetinib 2 mg daily.  We are also going to see that there are new BRAF inhibitors and MEK inhibitors approved for malignant melanoma.  We will see if we can obtain them for her.  2. RTC in another week to 10 days to see how she is handling the treatment.  3. Continue anticoagulation per plan.    4. Good diet and exercise.    Clinical stage      Lung cancer, Right side    Primary site: Lung (Right)    Staging method: AJCC 7th Edition    Clinical: Stage IV (T4, N0, M1a) signed by Jemal Evans MD on 10/20/2014  2:19 PM    Summary: Stage IV  (T4, N0, M1a)      History     Blanca Oreilly is a very pleasant 85 y.o. old female with a history of  non-small cell lung cancer located in the right lung measuring 5.7 cm in size presenting with some shortness of breath associated with malignant pleural effusion in the month of October 2014.  Pleural tap was positive for malignant cells adenocarcinoma in nature.  CK 7 positive CK 20 negative TTF-1 positive.  Subsequently that she was admitted due to worsening shortness of breath and her pleural effusion was tapped . She had Pleurx catheter placed but then removed as pleural fluid has reduced to few cc per week.   Her PET scan done at [facility] showed disease limited to thorax only. She has started on chemotherapy with carboplatin and Taxol with avastin. Has had 4 doses. Her PET scan showed nearly complete response. So we started her on Alimta and Avastin. She started that in February 2015.    She had CT in September 2015 which showed some worsening of subcrinal LN. Then had pet scan. That shows progression. Started back on Carboplatin and Taxol. We did give her Avastin but then we had to hold it due to protienuria. CT scan after 2 cycles showed improvement. PET scan after 4 showed partial response. She was tested for PD-L1 and was positive for both opdivo and Pembrolizumab. So we started on Pembrolizumab in late January 2016.  However PET scan after 3 cycles showed progression. So in April we started on Opdivo.    Was in hospital in the beginning of September 2016 with GI bleed and was found to have gastric ulcer.  She was started on PPI. Coumadin stopped.  In October 2016 her PET scan  showed progression.  Opdivo has been stopped.  Her PET scan also showed some fullness on her kidney.  Her creatinine had gone up.  She was then admitted and started on some IV fluids and seen by nephrology.  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      She has somewhat recovered from  her nephritis.  High doses of steroids actually give her diabetes.  In the meantime she also lost her  was battling myelodysplastic syndrome.  She had a follow-up endoscopy for the gastric ulcer and that came back positive for adenocarcinoma consistent with lung primary.  She has been started on salvage chemotherapy with single agent Abraxane.  She had 1 cycle in November.  She is here for her next cycle.    She is managing her diabetes well.  Dr. Mayo started her on insulin.  Her blood sugars are getting better.    Tolerated Abraxane well. The scan after 3 cycles showed significant improvement in the mediastinal as well as abdominal lymphadenopathy.   She did have an abnormal stress test for which she ended up being in the hospital.  A reversible ischemic defect was noted.  She has been put on nitrates.  She is otherwise doing well.  She has not had any EGD since starting her chemotherapy.  She had PET scan in March which actually showed complete response.    She continued Abraxane however her neuropathy seems to have gotten worse.  We stopped treatment in April 2017.     PET scan in May 2017 showed progression. I recommended trying Tecentriq which she started end of May 2017.   PET scan in August 2017 showed partial response.  However in late September 2017 she started to have elevation of her liver function test which required me to stop her treatment.  In spite of high dose of prednisone her liver function test continue to get worse.  She had PET scan done November 2017 which showed that she had developed new liver metastases.      Due to the fact that her tumor molecular profiling on strata testing showed that she had BRAF   mutation I prescribed dabrafenib and Trimetinib.  She started those medications sometime in the first of the second week of December 2017. She was diagnosed to have a DVT in her left calf on December 7, 2017.  She has been started on anticoagulation.  She eventually was  unable to handle those medications and stopped them I believe 12 December 2017.  Her PET scan in January 2018 actually showed that she had significant improvement in her disease which was surprising.  In my opinion it most likely represented delayed response to TECCENTRIQ or some spontaneous regression.  My impression at that time was that it is unlikely that she responded to few pills of Trimetinib and dabrafenib.    Then in the beginning of April her CT scan showed progression mostly in the lymph nodes of the mediastinum and upper abdomen.  We discussed treatment options and interventions.  We discussed going to TECCENTRIQ or resuming dabrafenib and Trimetinib.  She eventually started with the oral pills.  She took them for about a week or so the last dose was sometime on 15 April 2018.  She was admitted at [facility] with fever which were felt to be secondary to these medications.    In May 2018 she was recommended to go back on treatment with dabrafenib and trimatinib but only one dose of each once a week. She is able to take it.  Then we decreased the time between the doses to every 6 day in middle of June 2018.  Then in August 2018 she increased it to half the dose of Dabarafenib (75mg two times a day ) and full dose of Trametinib (2mg daily).  She has been handling it well.  Her scan in November 2018 showed stable disease.    She comes in today for follow-up.  Since her last visit here in November 2018 she has had several issues with memory.  She has significant memory loss and recollection issues.  She has been seen by a neurologist.  She also had an MRI done which was negative for any metastatic disease.  She has been prescribed Namenda for memory issues.  She did stop taking her dabrafenib and Trimetinib in the last week of January 2019 as she felt that they might be causing her memory issue. Her CT showed slight progression. She is doing OK after restarting and she is also taking Aricept. Very  happy with Aricept.      Her CT scan in April showed some improvement in the mediastinal lymph adenopathy.  She continue the treatment.  She came in last week after CT scan done on 12 July 2019 which actually showed significant progression.  We had a lengthy discussion.  I had recommended hospice care as the first choice.  The patient is very adamantly opposed to that.  She wanted to continue treatment.  So has a first resort I asked her to go up to closer to the full dose of dabrafenib and Trimetinib.  She has been taking that and comes in today for follow-up.  She thinks she is doing well.      Past Medical History     Past Medical History:   Diagnosis Date     A-fib (H)      Anemia      Cancer of lung (H)      Carotid stenosis      Chronic kidney disease      CKD (chronic kidney disease)      Coronary artery disease      Diabetes mellitus (H) 11/1/2016     Diabetes mellitus, type II (H)      Exacerbation of asthma 4/4/2007     High cholesterol      HTN (hypertension)      Hyperlipidemia      Hypertension      Peripheral neuropathy      Pleural effusion      Primary lung adenocarcinoma (H)     Stage IV NSCLC     Upper GI bleed      Review of Systems   Constitutional   Denies any complaints.  Neurosensory     Cardiovascular     Pulmonary     Gastrointestinal     Genitourinary     Integumentary     Patient Coping  Patient Coping: Accepting;Open/discussion;Anxiety  ECOG Performance   1  Pain Status  Currently in Pain: No/denies  Accompanied by  Accompanied by: Family Member  Constitutional  Constitutional (WDL): Exceptions to WDL  Fatigue: Concerns  Fever: No Concerns  Chills: No Concerns  Weight Gain: No Concerns  Weight Loss: No Concerns  Hot flashes/Night Sweats: No Concerns  Neurosensory  Neurosensory (WDL): All neurosensory elements are within defined limits  Peripheral Motor Neuropathy: No Concerns  Ataxia: No Concerns  Peripheral Sensory Neuropathy: No Concerns  Confusion: No Concerns  Dizziness: No  Concerns  Syncope: No Concerns  Eye   Eye Disorder (WDL): Exceptions to WDL(glasses)  Blurred Vision: Concerns(floaters)  Dry Eye: No Concerns  Eye Pain: No Concerns  Watering Eyes: No Concerns  Ear  Ear Disorder (WDL): All ear disorder elements are within defined limits  Ear Pain: No Concerns  Tinnitus: No Concerns  Cardiovascular  Cardiovascular (WDL): All cardiovascular elements are within defined limits  Palpitations: No Concerns  Edema: No Concerns  SVT, DVT/PE: No Concerns  Chest Pain - Cardiac: No Concerns  Pulmonary  Respiratory (WDL): Within Defined Limits  Cough: No Concerns  Dyspnea: No Concerns  Hypoxia: No Concerns  Gastrointestinal  Gastrointestinal (WDL): All gastrointestinal elements are within defined limits  Anorexia: No Concerns  Nausea: No Concerns  Vomiting: No Concerns  Dehydration: No Concerns  Dysgeusia: No Concerns  Dysphagia: No Concerns  Mucositis Oral: No Concerns  Esophagitis: No Concerns  Constipation: No Concerns  Diarrhea: No Concerns  Pharyngitis: No Concerns  Dry Mouth: No Concerns  Genitourinary  Genitourinary (WDL): All genitourinary elements are within defined limits  Urinary Frequency: No Concerns  Urinary Retention: No Concerns  Urinary Tract Pain: No Concerns  Lymphatic  Lymph (WDL): All lymph disorder elements are within defined limits  Lymphedema: No Concerns  Lymph Node Discomfort: No Concerns  Musculoskeletal and Connective Tissue  Musculoskeletal and Connetive Tissue Disorders (WDL): Exceptions to WDL  Arthralgia: No Concerns  Bone Pain: No Concerns  Muscle Weakness : Concerns  Myalgia: No Concerns  Integumentary  Integumentary (WDL): All integumentary elements are within defined limits  Alopecia: No Concerns  Rash Maculo-Papular: No Concerns  Pruritus: No Concerns  Urticaria: No Concerns  Palmar-Plantar Erythrodysesthesia Syndrome: No Concerns  Flushing: No Concerns  Patient Coping  Patient Coping: Accepting;Open/discussion;Anxiety  Accompanied by  Accompanied by: Family  Member  Oral Chemo Adherence           Vital Signs     There were no vitals filed for this visit.    Physical Exam     GENERAL: no acute distress. Cooperative in conversation.   HEENT: Starting to have alopecia. pupils are equal, round and reactive. Oral mucosa is clean and intact. No ulcerations or mucositis noted. No bleeding noted.  RESP:Chest symmetric lungs are clear bilaterally per auscultation. Regular respiratory rate. No wheezes or rhonchi.    CV: Normal S1 S2 Regular, rate and rhythm.   ABD: Nondistended, soft, slight epigastric tenderness. Positive bowel sounds. No organomegaly.   EXTREMITIES: No lower extremity edema.  Noticing slight increased edema around the lower part of her ankles.  NEURO: non focal. Alert and oriented x3.  No obvious neuro deficit.  PSYCH: within normal limits. No depression or anxiety.  SKIN: warm dry intact .      Lab Results     Results for orders placed or performed in visit on 07/18/19   Comprehensive Metabolic Panel   Result Value Ref Range    Sodium 138 136 - 145 mmol/L    Potassium 4.4 3.5 - 5.0 mmol/L    Chloride 100 98 - 107 mmol/L    CO2 31 22 - 31 mmol/L    Anion Gap, Calculation 7 5 - 18 mmol/L    Glucose 236 (H) 70 - 125 mg/dL    BUN 42 (H) 8 - 28 mg/dL    Creatinine 1.47 (H) 0.60 - 1.10 mg/dL    GFR MDRD Af Amer 41 (L) >60 mL/min/1.73m2    GFR MDRD Non Af Amer 34 (L) >60 mL/min/1.73m2    Bilirubin, Total 0.3 0.0 - 1.0 mg/dL    Calcium 10.1 8.5 - 10.5 mg/dL    Protein, Total 6.2 6.0 - 8.0 g/dL    Albumin 3.1 (L) 3.5 - 5.0 g/dL    Alkaline Phosphatase 86 45 - 120 U/L    AST 23 0 - 40 U/L    ALT 15 0 - 45 U/L   INR   Result Value Ref Range    INR 3.28 (H) 0.90 - 1.10   HM1 (CBC with Diff)   Result Value Ref Range    WBC 18.9 (H) 4.0 - 11.0 thou/uL    RBC 4.61 3.80 - 5.40 mill/uL    Hemoglobin 12.3 12.0 - 16.0 g/dL    Hematocrit 40.2 35.0 - 47.0 %    MCV 87 80 - 100 fL    MCH 26.7 (L) 27.0 - 34.0 pg    MCHC 30.6 (L) 32.0 - 36.0 g/dL    RDW 14.8 (H) 11.0 - 14.5 %     Platelets 321 140 - 440 thou/uL    MPV 10.5 8.5 - 12.5 fL   Manual Differential   Result Value Ref Range    Total Neutrophils % 79 (H) 50 - 70 %    Lymphocytes % 3 (L) 20 - 40 %    Monocytes % 4 2 - 10 %    Eosinophils %  13 (H) 0 - 6 %    Basophils % 1 0 - 2 %    Total Neutrophils Absolute 14.9 (H) 2.0 - 7.7 thou/ul    Lymphocytes Absolute 0.6 (L) 0.8 - 4.4 thou/uL    Monocytes Absolute 0.8 0.0 - 0.9 thou/uL    Eosinophils Absolute 2.5 (H) 0.0 - 0.4 thou/uL    Basophils Absolute 0.2 0.0 - 0.2 thou/uL    Platelet Estimate Normal Normal     *Note: Due to a large number of results and/or encounters for the requested time period, some results have not been displayed. A complete set of results can be found in Results Review.         Distress assessment and ECOG status      ECOG Performance:    ECOG Performance Status: 2    Distress Assessment  Distress Assessment Score: Extreme distress       Imaging Results       Ct Chest Without Contrast    Result Date: 7/12/2019  EXAM: CT CHEST WO CONTRAST LOCATION: [facility] DATE/TIME: 7/12/2019 4:03 PM INDICATION: Neoplasm: chest, lung, rx monitor or f/u COMPARISON: None. TECHNIQUE: Helical images were obtained through the chest. Multiplanar reformats were obtained. Dose reduction techniques were used. CONTRAST: None. FINDINGS: LUNGS AND PLEURA: No significant progression of tumor since 04/16/2019. Multiple tiny nodules throughout the left lower lobe and upper lobe with nodular pleural thickening consistent with lymphangitic spread of tumor throughout the left lung most prominent in the left lower lobe. New tiny left pleural effusion. MEDIASTINUM: Progression of mediastinal lymphadenopathy and left hilar lymphadenopathy. The previous 11 mm lymph node adjacent previous lower esophagus now measures 2.0 cm. Bulky AP window lymph nodes with a conglomeration of 2 nodes on image 96 measuring 3.2 x 2.5 cm. Bulky left hilar infiltrative adenopathy has markedly progressed. LIMITED  UPPER ABDOMEN: No significant findings. MUSCULOSKELETAL: No concerning bone lesions. Mild compression fracture T11 vertebrae, this has not significantly changed.     CONCLUSION: 1.  Marked progression of malignancy with lymphangitic tumor throughout the left lung centrally most prominent in the left lower lobe. 2.  Marked progression of mediastinal and left hilar lymphadenopathy.     Total time spent was 40 minutes, more than half of it was in face-to-face counseling regarding disease state, treatment, side effects and management.        Signed by: Jemal Evans MD

## 2021-05-30 NOTE — TELEPHONE ENCOUNTER
Fax received from [facility] pharmacy requesting Prior Authorization    Medication Name:   blood glucose test (CONTOUR NEXT TEST STRIPS) strips 500 strip 6 7/12/2019     Sig - Route: Use 1 each As Directed 5 (five) times a day. Dispense brand per patient's insurance at pharmacy discretion. - Miscellaneous    Sent to pharmacy as: blood glucose test (CONTOUR NEXT TEST STRIPS) strips    Notes to Pharmacy: DX Code- E11.9    E-Prescribing Status: Receipt confirmed by pharmacy (7/12/2019 10:52 AM CDT)          Insurance Plan: Medicare Part D   PBM: Caren   Patient ID Number: [id number]    Please start PA process for 5 times daily testing

## 2021-05-30 NOTE — TELEPHONE ENCOUNTER
New Appointment Needed  What is the reason for the visit:    Weak and having a hard time breathing.   Provider Preference: PCP only  How soon do you need to be seen?: tomorrow  Waitlist offered?: No  Okay to leave a detailed message:  Yes

## 2021-05-30 NOTE — TELEPHONE ENCOUNTER
RN cannot approve Refill Request    RN can NOT refill this medication med is not covered by policy/route to provider.       Deidra Stahl, [facility] Triage/Med Refill 7/22/2019    Requested Prescriptions   Pending Prescriptions Disp Refills     warfarin (COUMADIN/JANTOVEN) 1 MG tablet [Pharmacy Med Name: WARFARIN SODIUM 1 MG TABLET] 60 tablet 11     Sig: TAKE 1 TABLET BY MOUTH ONCE DAILY (IN ADDITION TO 4MG FOR TOTAL DOSE OF 5MG DAILY)ADJUST AS DIRECTED       Warfarin Refill Protocol  Failed - 7/21/2019 10:16 PM        Failed -  Route to appropriate pool/provider     Last Anticoagulation Summary:   Anticoagulation Episode Summary     Current INR goal:   2.0-3.0   TTR:   44.0 % (1.4 y)   Next INR check:   7/24/2019   INR from last check:   3.28! (7/18/2019)   Weekly max warfarin dose:      Target end date:      INR check location:      Preferred lab:      Send INR reminders to:    MEDICAL ONCOLOGY SUPPORT POOL       Comments:                      Passed - Provider visit in last year     Last office visit with prescriber/PCP: 7/9/2019 Marco Garcia MD OR same dept: Visit date not found OR same specialty: Visit date not found  Last physical: Visit date not found Last MTM visit: 3/26/2018 Chauncey Zuñiga, PharmD    Next appt within 3 mo: Visit date not found Next physical within 3 mo: Visit date not found  Prescriber OR PCP: Marco Garcia MD  Last diagnosis associated with med order: 1. DVT (deep venous thrombosis) (H)  - warfarin (COUMADIN/JANTOVEN) 1 MG tablet [Pharmacy Med Name: WARFARIN SODIUM 1 MG TABLET]; TAKE 1 TABLET BY MOUTH ONCE DAILY (IN ADDITION TO 4MG FOR TOTAL DOSE OF 5MG DAILY)ADJUST AS DIRECTED  Dispense: 60 tablet; Refill: 11    If protocol passes may refill for 6 months if within 3 months of last provider visit (or a total of 9 months).

## 2021-05-30 NOTE — TELEPHONE ENCOUNTER
Spoke with pt's daughter and relayed PCP messages.  Pt's daughter stated pt is refusing the mirtazapine and will not take it.

## 2021-05-30 NOTE — TELEPHONE ENCOUNTER
Sent in a new prescription for test strips with a 5 time a day testing specification.  We will see if this changes coverage for strips

## 2021-05-30 NOTE — PROGRESS NOTES
ASSESSMENT:  1. Frequency of urination  She notes frequent urination with minor dysuria.  Urine analysis will be checked to make sure there is not an infection    2. Type 2 diabetes mellitus with hyperglycemia, with long-term current use of insulin (H)  Blood sugars are fairly labile.  She reports she is taking insulin most days.  She denies symptomatic hypoglycemia.  Current hemoglobin A1c is higher than in the past at 8.6.  Given her age and comorbidities, avoidance of hypoglycemia takes precedence over extremely tight control  - Glycosylated Hemoglobin A1c  - Comprehensive Metabolic Panel    3. Generalized muscle weakness  She notes easy fatigability shortness of breath with activity and weakness.  I suspect this is multifactorial.  She appears rather pale.  Hemoglobin and monitoring labs will be checked.  She has an upcoming CT already scheduled by Dr. Evans for follow-up of her metastatic lung cancer.  She does not appear fluid overloaded  - HM2(CBC w/o Differential)    4. Dysuria  UA will be checked as noted above  - Urinalysis-UC if Indicated    5. Primary insomnia  She had been on mirtazapine back in 2015.  It was stopped for unclear reasons.  She notes problems with insomnia and poor appetite.  She also acknowledges intermittent depressive symptoms.  I would favor trying mirtazapine again at a lower dose  - mirtazapine (REMERON) 7.5 MG tablet; Take 1 tablet (7.5 mg total) by mouth at bedtime.  Dispense: 30 tablet; Refill: 11    6.  Essential hypertension  Blood pressure is acceptable on current regimen.    7.  Paroxysmal atrial fibrillation:  Clinically she is in sinus rhythm.  She remains on warfarin as an anticoagulant.    8.  Metastatic lung cancer:  Monitored closely by Dr. Evans.  She has a CT scheduled for the near future.  PLAN:  Patient Instructions   1.  Start Mirtazapine 7.5 mg in evening.  Note effect on sleep and appetite.    2.  Labs as outlined.  I will notify you of test results    3.  See  in 3 months or as needed  Orders Placed This Encounter   Procedures     Glycosylated Hemoglobin A1c     Comprehensive Metabolic Panel     HM2(CBC w/o Differential)     Urinalysis-UC if Indicated     There are no discontinued medications.    Return in about 3 months (around 10/9/2019) for Recheck.      ASSESSED PROBLEMS:  1. Frequency of urination  CANCELED: Urinalysis-UC if Indicated   2. Type 2 diabetes mellitus with hyperglycemia, with long-term current use of insulin (H)  Glycosylated Hemoglobin A1c    Comprehensive Metabolic Panel   3. Generalized muscle weakness  HM2(CBC w/o Differential)   4. Dysuria  Urinalysis-UC if Indicated   5. Primary insomnia  mirtazapine (REMERON) 7.5 MG tablet       CHIEF COMPLAINT:  Chief Complaint   Patient presents with     Fatigue     Abdominal Pain     right lower sbdominal pain, denied diarrhea and constipation     Breathing Problem     SOB,      Urinary Tract Infection     freqquency urination       HISTORY OF PRESENT ILLNESS:  Blanca is a 85 y.o. female presenting to the clinic today with complaints of fatigue, abdominal pain, shortness of breath, and urinary frequency. She is accompanied by her daughter.     Insomnia: She does not sleep well and has felt very weak and fatigued lately. She finds herself watching television frequently during the night after failing to fall asleep. She finally starts to feel tired around the time she would normally get up. She does not think she has taken mirtazapine. There was a medication she tried a couple years ago that altered her thinking, so she threw it away, but she does not remember what it was. She notes she did not sleep particularly well as a child either.     Abdominal Pain: She has intermittent pain in her right lower abdomen. It feels like a cramp when she has it, but it comes and goes. She denies diarrhea and has been more constipated than anything. She was taking Imodium for a while, but she has not been lately. She has not had  "any blood in her stool.     UTI: She was given doxycycline earlier this year for a suspected UTI, but she did not tolerate it and did not finish the prescription. She called in and was given Keflex instead. She would like to have a UA checked today to make sure her urine is clear now. She has some pain prior to urination but frequency is her biggest complaint.     Diabetes: Her blood sugar has been in an okay range when she checks, but she claims to have had a few low readings. Her last hemoglobin A1c was 7.5% on 2019.     Shortness of Breath: She has been short of breath lately. She struggles with the humidity. She is scheduled for a follow up CT of the chest for Dr. Evans soon.     REVIEW OF SYSTEMS:   She feels like she is probably depressed. She admits that she could eat better. Her knees start to hurt and feel \"like Jell-O\" while walking around the grocery store. She finds herself needing to hold onto something for stabilization. All other systems are negative.    PFSH:  [place].     TOBACCO USE:  Social History     Tobacco Use   Smoking Status Former Smoker     Packs/day: 0.10     Years: 30.00     Pack years: 3.00     Last attempt to quit: 10/30/1980     Years since quittin.7   Smokeless Tobacco Never Used   Tobacco Comment    Not a steady smoker       VITALS:  Vitals:    19 0756   BP: 110/64   Patient Site: Left Arm   Patient Position: Sitting   Cuff Size: Adult Regular   Pulse: 64   Temp: 97.7  F (36.5  C)   TempSrc: Tympanic   SpO2: 97%   Weight: 112 lb (50.8 kg)   Height: 5' (1.524 m)     Wt Readings from Last 3 Encounters:   19 112 lb (50.8 kg)   19 110 lb (49.9 kg)   19 112 lb 7 oz (51 kg)     Body mass index is 21.87 kg/m .    PHYSICAL EXAM:  Constitutional:   Reveals an alert, pleasant, talkative woman. She appears pale and rather frail but not acutely ill.  Vitals: per nursing notes.  HEENT: Atraumatic. Thinning hair.    Eyes: No scleral icterus. " "  Oropharynx:   Mouth and throat clear, no mouth ulcers, thrush or exudate.  Neck:  Supple, no carotid bruits or adenopathy.  Back:  No spine or CVA pain.  Thorax:  No bony deformities.  Lungs: Clear to A&P without rales or wheezes.  Respiratory effort normal.  Cardiac:   Regular rate and rhythm, normal S1, S2, no murmur or gallop.  Abdomen:  Soft, active bowel sounds without bruits, mass, or tenderness.  Extremities:   No peripheral edema    Skin: Pale.   Neuro:  No gross focal deficits. Detailed exam not done   Psychiatric:  Memory intact, mood appropriate.    ADDITIONAL HISTORY SUMMARIZED (2): Reviewed notes from 2015 looking for mention of why mirtazapine was discontinued.   DECISION TO OBTAIN EXTRA INFORMATION (1): None.   RADIOLOGY TESTS (1): Reviewed 4/16/2019 CT chest - unchanged lung nodules, slight lymph node changes.  LABS (1): Labs from April 2019 reviewed. Labs ordered.   MEDICINE TESTS (1): None.  INDEPENDENT REVIEW (2 each): None.     The visit lasted a total of 21 minutes face to face with the patient. Over 50% of the time was spent counseling and educating the patient about her insomnia, abdominal pain, diabetes, and UTI.    I, Maximiliano Morrissey, am scribing for and in the presence of, Dr. Garcia.    I, Marco Garcia, personally performed the services described in this documentation, as scribed by Maximiliano Morrissey in my presence, and it is both accurate and complete.    Dragon dictation was used for this note.  Speech recognition errors are a possibility.    MEDICATIONS:  Current Outpatient Medications   Medication Sig Dispense Refill     amLODIPine (NORVASC) 5 MG tablet Take 1 tablet (5 mg total) by mouth daily. 90 tablet 3     atorvastatin (LIPITOR) 40 MG tablet TAKE 1 TABLET BY MOUTH DAILY. 90 tablet 2     BASAGLAR KWIKPEN U-100 INSULIN 100 unit/mL (3 mL) pen INJECT 6 UNITS UNDER THE SKIN AT BEDTIME. 15 adj dose pen 0     BD ULTRA-FINE JOHN PEN NEEDLE 32 gauge x 5/32\" Ndle USE 4 TIMES DAILY AS " DIRECTED WITH INSULIN 400 each 2     blood glucose test (CONTOUR NEXT TEST STRIPS) strips Use 1 each As Directed 3 (three) times a day. Dispense brand per patient's insurance at pharmacy discretion. 300 strip 6     dabrafenib 75 mg cap Take 150 mg by mouth every other day. 60 capsule 3     donepezil (ARICEPT) 10 MG tablet Take 1 tablet (10 mg total) by mouth at bedtime. 30 tablet 11     INSULIN ASPART (NOVOLOG FLEXPEN SUBQ) Inject 0-8 Units under the skin 3 (three) times a day before meals. Dose per sliding scale  BG less than 150 = 0 units  151-200 = 4 units  201- 250 = 6 units  > 251 = 8 units       insulin aspart U-100 (NOVOLOG FLEXPEN U-100 INSULIN) 100 unit/mL injection pen Inject 4 Units under the skin 3 (three) times a day before meals. 15 mL 1     isosorbide mononitrate (IMDUR) 30 MG 24 hr tablet TAKE 1 TABLET BY MOUTH EVERY DAY 90 tablet 1     lisinopril (PRINIVIL,ZESTRIL) 10 MG tablet Take 1 tablet (10 mg total) by mouth daily. 90 tablet 3     LORazepam (ATIVAN) 1 MG tablet Take 1 mg by mouth as needed.  0     metoprolol succinate (TOPROL-XL) 50 MG 24 hr tablet Take 1.5 tablets (75 mg total) by mouth at bedtime. 135 tablet 3     multivitamin (ONE A DAY) per tablet Take 1 tablet by mouth daily.       nitroglycerin (NITROSTAT) 0.4 MG SL tablet Place 1 tablet (0.4 mg total) under the tongue every 5 (five) minutes as needed for chest pain. 20 tablet 1     ranitidine (ZANTAC) 150 MG tablet TAKE 1 TAB BY MOUTH TWICE DAILY. 180 tablet 3     trametinib (MEKINIST) 2 mg Tab Take 2 mg by mouth every other day. 30 tablet 3     warfarin (COUMADIN/JANTOVEN) 4 MG tablet 4 mg  alt with 5 mg 90 tablet 3     mirtazapine (REMERON) 7.5 MG tablet Take 1 tablet (7.5 mg total) by mouth at bedtime. 30 tablet 11     No current facility-administered medications for this visit.        Total data points: 4

## 2021-05-30 NOTE — TELEPHONE ENCOUNTER
Spoke with pt's daughter and relayed PCP message.  Assisted in scheduling pt for an appt tomorrow at 8am.

## 2021-05-30 NOTE — PATIENT INSTRUCTIONS - HE
1.  Start Mirtazapine 7.5 mg in evening.  Note effect on sleep and appetite.    2.  I will notify you of test results    3.  See in 3 months or as needed

## 2021-05-30 NOTE — TELEPHONE ENCOUNTER
Please notify Blanca that diabetic testing is only covered for a maximum of 3 times daily on her insurance plan.

## 2021-05-30 NOTE — TELEPHONE ENCOUNTER
Patient Returning Call  Reason for call:  Patient called back.  Information relayed to patient:  Informed of Dr Garcia's message listed below.  Patient states understanding.  Patient has additional questions:  No  If YES, what are your questions/concerns:    Okay to leave a detailed message?: No call back needed

## 2021-05-30 NOTE — TELEPHONE ENCOUNTER
Blanca calls in and wants to know how long she should be off of her warfarin prior to having a tooth pulled. Per Dr Evans, she should be off of it for 3 days prior.  I called her to report this.  I was able to give her the instruction of that and then to restart the evening of the procedure.  She told me that she can't get in to have it pulled until the end of July but then our conversation/the line was dropped and I could not hear her.  I was unable to get back to her.  If she has other questions, she will call.  She also has an appt in our office prior to the end of July.    Francisca Ortiz RN

## 2021-05-30 NOTE — PROGRESS NOTES
[facility] Hematology and Oncology Progress Note    Patient: Blanca Oreilly  MRN: [id number]  Date of Service: 7/18/2019           Reason for visit      1. Malignant neoplasm of upper lobe bronchus, right, PD-L1 and [id number] mutation positive.    2. Pain in extremity     3. Abnormal coagulation profile     4. Malignant neoplasm of upper lobe bronchus, right (H)    NSCLC; EGFR and ALK-1 negative. PD-L1 strongly positive for both 22C3, 28-8    Assessment     1. Very pleasant 85 y.o. woman with stage IV NSCLC, adenocarcinoma. Negative for EGFR  And ALK-1.  PD-L1 she is strongly positive.  On strata testing she has BRA [id number] mutation.  November 2017 PET scan suggestive of liver metastases as well as mediastinal lymph node metastases.  She was prescribed and took few days of dabrafenib and Trimetinib in the second week of December 2017.  Stopped after few days.  Surprisingly her PET scan in January 2018 showed very good response.  CT scan in the beginning of April showed progression.  She resumed dabrafenib and Trimetinib.  Took them for a few days.  Then stopped but in April 2018 CT scan again shows that she has responded to the medication. So in May 2018 she went  back on treatment with dabrafenib and trimatinib but only one dose of each once a week.  In June 2018 frequency increased to 1 dose of each pill every 6 day.  Then in July 2018 she increase the frequency of taking one dose of each medication every fifth day.  Then she went to half the dose of Dabarafenib (75mg two times a day ) and full dose of Trametinib (2mg daily).  November 2018 scan showed stable to mild improvement in the mediastinal adenopathy.  She stopped dabrafenib and Trimetinib in January 2019 due to memory issues.  CT scan at that time showed that she may have slight progression in one mediastinal lymph node.  Convinced her to start taking it again and she started taking it again.  Her follow-up CT scan in April 2019 was showing some  improvement.  Unfortunately now her CT scan shows clear signs of progression.  2.  History of prior treatment include 4 cycles of carboplatin, taxol and avastin. Then was on Alimta and Avastin.  Later on she has had repeat treatment with 4 cycles Carboplatin and Taxol. PET scan showed good response.  Started on Pembrolizumab but after 4 cycles there was some progression.  She then tried and progressed on Opdivo.  Then she was on Abraxane from November 2016 until April 2017. PET scan showed complete response. Stopped due to side effects. Her May 2017 PET scan showed progression.  She was on Atezulizumab from end of May beginning of October 2017. PET scan in November 2017 showed worsening of the liver metastases.  Was on dabrafenib and Trimetinib since December 2017.  3. History of gastric ulcer pathology is consistent with non-small cell lung cancer.  Clinically she seems to be doing well on that her hemoglobin is stable and she has not had any more blood in her stools.  4. She has elevated creatinine.  5. History of DVT for which she is on anticoagulation.  INR is supratherapeutic.  6. Anxiety and depression.  Seems to be better.  7. History of momentary blindness which she is concerned might be due to dabrafenib and try imatinib.  8. Now having significant memory issues.  Has been seen by neurologist.  Currently on Aricept and very happy with the results.    Plan     1. I had lengthy discussion with the patient regarding her CT scan finding.  These are clearly showing progression.  Her options are getting limited.  She has previously been treated with platinum based chemotherapy and on her last treatment had minor response is not of side effects.  We have also tried immunotherapy in she has failed that.  So the options are quite limited.  We did talk about hospice care and palliative care.  The patient is adamantly opposed to any hospice.  She wants to try to treat this cancer.  Therefore at this time I would  recommend that she should continue with the current targeted therapy but increase the dose of dabrafenib to 150 mg daily along with Trimetinib 2 mg daily.  We will see how she tolerates that.  If she is not able to tolerate then consider cytotoxic chemotherapy in the form of Topotecan.  I did give the patient printed information about Topotecan.  2. RTC in in a week to see how she is handling increased dosing of dabrafenib and Trimetinib.    3. Continue anticoagulation per plan.  The patient was advised regarding the change in her Coumadin dosing.  4. Good diet and exercise.    Clinical stage      Lung cancer, Right side    Primary site: Lung (Right)    Staging method: AJCC 7th Edition    Clinical: Stage IV (T4, N0, M1a) signed by Jemal Evans MD on 10/20/2014  2:19 PM    Summary: Stage IV (T4, N0, M1a)      History     Blanca Oreilly is a very pleasant 85 y.o. old female with a history of  non-small cell lung cancer located in the right lung measuring 5.7 cm in size presenting with some shortness of breath associated with malignant pleural effusion in the month of October 2014.  Pleural tap was positive for malignant cells adenocarcinoma in nature.  CK 7 positive CK 20 negative TTF-1 positive.  Subsequently that she was admitted due to worsening shortness of breath and her pleural effusion was tapped . She had Pleurx catheter placed but then removed as pleural fluid has reduced to few cc per week.   Her PET scan done at [facility] showed disease limited to thorax only. She has started on chemotherapy with carboplatin and Taxol with avastin. Has had 4 doses. Her PET scan showed nearly complete response. So we started her on Alimta and Avastin. She started that in February 2015.    She had CT in September 2015 which showed some worsening of subcrinal LN. Then had pet scan. That shows progression. Started back on Carboplatin and Taxol. We did give her Avastin but then we had to hold it due to protienuria. CT scan after  2 cycles showed improvement. PET scan after 4 showed partial response. She was tested for PD-L1 and was positive for both opdivo and Pembrolizumab. So we started on Pembrolizumab in late January 2016.  However PET scan after 3 cycles showed progression. So in April we started on Opdivo.    Was in hospital in the beginning of September 2016 with GI bleed and was found to have gastric ulcer.  She was started on PPI. Coumadin stopped.  In October 2016 her PET scan  showed progression.  Opdivo has been stopped.  Her PET scan also showed some fullness on her kidney.  Her creatinine had gone up.  She was then admitted and started on some IV fluids and seen by nephrology.  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  High doses of steroids actually give her diabetes.  In the meantime she also lost her  was battling myelodysplastic syndrome.  She had a follow-up endoscopy for the gastric ulcer and that came back positive for adenocarcinoma consistent with lung primary.  She has been started on salvage chemotherapy with single agent Abraxane.  She had 1 cycle in November.  She is here for her next cycle.    She is managing her diabetes well.  Dr. Mayo started her on insulin.  Her blood sugars are getting better.    Tolerated Abraxane well. The scan after 3 cycles showed significant improvement in the mediastinal as well as abdominal lymphadenopathy.   She did have an abnormal stress test for which she ended up being in the hospital.  A reversible ischemic defect was noted.  She has been put on nitrates.  She is otherwise doing well.  She has not had any EGD since starting her chemotherapy.  She had PET scan in March which actually showed complete response.    She continued Abraxane however her neuropathy seems to have gotten worse.  We stopped treatment in April 2017.     PET scan in May 2017 showed progression. I  recommended trying Tecentriq which she started end of May 2017.   PET scan in August 2017 showed partial response.  However in late September 2017 she started to have elevation of her liver function test which required me to stop her treatment.  In spite of high dose of prednisone her liver function test continue to get worse.  She had PET scan done November 2017 which showed that she had developed new liver metastases.      Due to the fact that her tumor molecular profiling on strata testing showed that she had BRAF   mutation I prescribed dabrafenib and Trimetinib.  She started those medications sometime in the first of the second week of December 2017. She was diagnosed to have a DVT in her left calf on December 7, 2017.  She has been started on anticoagulation.  She eventually was unable to handle those medications and stopped them I believe 12 December 2017.  Her PET scan in January 2018 actually showed that she had significant improvement in her disease which was surprising.  In my opinion it most likely represented delayed response to TECCENTRIQ or some spontaneous regression.  My impression at that time was that it is unlikely that she responded to few pills of Trimetinib and dabrafenib.    Then in the beginning of April her CT scan showed progression mostly in the lymph nodes of the mediastinum and upper abdomen.  We discussed treatment options and interventions.  We discussed going to TECCENTRIQ or resuming dabrafenib and Trimetinib.  She eventually started with the oral pills.  She took them for about a week or so the last dose was sometime on 15 April 2018.  She was admitted at [facility] with fever which were felt to be secondary to these medications.    In May 2018 she was recommended to go back on treatment with dabrafenib and trimatinib but only one dose of each once a week. She is able to take it.  Then we decreased the time between the doses to every 6 day in middle of June 2018.   Then in August 2018 she increased it to half the dose of Dabarafenib (75mg two times a day ) and full dose of Trametinib (2mg daily).  She has been handling it well.  Her scan in November 2018 showed stable disease.    In November 2018 she has had several issues with memory.  She has significant memory loss and recollection issues.  She has been seen by a neurologist.  She also had an MRI done which was negative for any metastatic disease.  She has been prescribed Namenda for memory issues.  She did stop taking her dabrafenib and Trimetinib in the last week of January 2019 as she felt that they might be causing her memory issue. Her CT showed slight progression. She is doing OK after restarting and she is also taking Aricept. Very happy with Aricept.      She comes in today for follow-up after CT scan.  Overall she is doing okay.  Getting little bit weaker.  Little bit more short of breath as well.      Past Medical History     Past Medical History:   Diagnosis Date     A-fib (H)      Anemia      Cancer of lung (H)      Carotid stenosis      Chronic kidney disease      CKD (chronic kidney disease)      Coronary artery disease      Diabetes mellitus (H) 11/1/2016     Diabetes mellitus, type II (H)      Exacerbation of asthma 4/4/2007     High cholesterol      HTN (hypertension)      Hyperlipidemia      Hypertension      Peripheral neuropathy      Pleural effusion      Primary lung adenocarcinoma (H)     Stage IV NSCLC     Upper GI bleed      Review of Systems   Constitutional  Constitutional (WDL): Exceptions to WDL  Fatigue: Fatigue relieved by rest(sleeping issues)Denies any complaints.  Neurosensory  Neurosensory (WDL): Exceptions to WDL  Peripheral Motor Neuropathy: Asymptomatic, clinical or diagnostic observations only, intervention not indicated(hands)  Ataxia: Moderate symptoms, limiting instrumental ADL  Peripheral Sensory Neuropathy: Moderate symptoms, limiting instrumental ADL(feet and hands and around  mouth)  Cardiovascular  Cardiovascular (WDL): All cardiovascular elements are within defined limits  Pulmonary  Dyspnea: Shortness of breath at rest, limiting self care ADL  Hypoxia: Decreased oxygen saturation with exercise (e.g., pulse oximeter <88%), intermittent supplemental oxygen  Gastrointestinal  Gastrointestinal (WDL): Exceptions to WDL  Anorexia: Loss of appetite without alteration in eating habits  Genitourinary  Genitourinary (WDL): All genitourinary elements are within defined limits  Integumentary  Integumentary (WDL): All integumentary elements are within defined limits  Patient Coping  Patient Coping: Accepting;Anxiety  ECOG Performance   1  Pain Status  Currently in Pain: No/denies  Accompanied by  Accompanied by: Family Member(daughter)  Constitutional     Neurosensory     Eye      Ear     Cardiovascular     Pulmonary     Gastrointestinal     Genitourinary     Lymphatic     Musculoskeletal and Connective Tissue     Integumentary     Patient Coping  Patient Coping: Accepting;Anxiety  Accompanied by  Accompanied by: Family Member(daughter)  Oral Chemo Adherence           Vital Signs     Vitals:    07/18/19 1024   BP: 147/63   Pulse: 60   Temp: 97.4  F (36.3  C)   SpO2: 90%       Physical Exam     GENERAL: no acute distress. Cooperative in conversation.   HEENT: Starting to have alopecia. pupils are equal, round and reactive. Oral mucosa is clean and intact. No ulcerations or mucositis noted. No bleeding noted.  RESP:Chest symmetric lungs are clear bilaterally per auscultation. Regular respiratory rate. No wheezes or rhonchi.    CV: Normal S1 S2 Regular, rate and rhythm.   ABD: Nondistended, soft, slight epigastric tenderness. Positive bowel sounds. No organomegaly.   EXTREMITIES: No lower extremity edema.  Noticing slight increased edema around the lower part of her ankles.  NEURO: non focal. Alert and oriented x3.  No obvious neuro deficit.  PSYCH: within normal limits. No depression or  anxiety.  SKIN: warm dry intact .      Lab Results     Results for orders placed or performed in visit on 07/18/19   INR   Result Value Ref Range    INR 3.28 (H) 0.90 - 1.10   HM1 (CBC with Diff)   Result Value Ref Range    WBC 18.9 (H) 4.0 - 11.0 thou/uL    RBC 4.61 3.80 - 5.40 mill/uL    Hemoglobin 12.3 12.0 - 16.0 g/dL    Hematocrit 40.2 35.0 - 47.0 %    MCV 87 80 - 100 fL    MCH 26.7 (L) 27.0 - 34.0 pg    MCHC 30.6 (L) 32.0 - 36.0 g/dL    RDW 14.8 (H) 11.0 - 14.5 %    Platelets 321 140 - 440 thou/uL    MPV 10.5 8.5 - 12.5 fL   Manual Differential   Result Value Ref Range    Total Neutrophils % 79 (H) 50 - 70 %    Lymphocytes % 3 (L) 20 - 40 %    Monocytes % 4 2 - 10 %    Eosinophils %  13 (H) 0 - 6 %    Basophils % 1 0 - 2 %    Total Neutrophils Absolute 14.9 (H) 2.0 - 7.7 thou/ul    Lymphocytes Absolute 0.6 (L) 0.8 - 4.4 thou/uL    Monocytes Absolute 0.8 0.0 - 0.9 thou/uL    Eosinophils Absolute 2.5 (H) 0.0 - 0.4 thou/uL    Basophils Absolute 0.2 0.0 - 0.2 thou/uL    Platelet Estimate Normal Normal     *Note: Due to a large number of results and/or encounters for the requested time period, some results have not been displayed. A complete set of results can be found in Results Review.         Distress assessment and ECOG status      ECOG Performance:    ECOG Performance Status: 3    Distress Assessment  Distress Assessment Score: Extreme distress       Imaging Results       Ct Chest Without Contrast    Result Date: 7/12/2019  EXAM: CT CHEST WO CONTRAST LOCATION: [facility] DATE/TIME: 7/12/2019 4:03 PM INDICATION: Neoplasm: chest, lung, rx monitor or f/u COMPARISON: None. TECHNIQUE: Helical images were obtained through the chest. Multiplanar reformats were obtained. Dose reduction techniques were used. CONTRAST: None. FINDINGS: LUNGS AND PLEURA: No significant progression of tumor since 04/16/2019. Multiple tiny nodules throughout the left lower lobe and upper lobe with nodular pleural thickening  consistent with lymphangitic spread of tumor throughout the left lung most prominent in the left lower lobe. New tiny left pleural effusion. MEDIASTINUM: Progression of mediastinal lymphadenopathy and left hilar lymphadenopathy. The previous 11 mm lymph node adjacent previous lower esophagus now measures 2.0 cm. Bulky AP window lymph nodes with a conglomeration of 2 nodes on image 96 measuring 3.2 x 2.5 cm. Bulky left hilar infiltrative adenopathy has markedly progressed. LIMITED UPPER ABDOMEN: No significant findings. MUSCULOSKELETAL: No concerning bone lesions. Mild compression fracture T11 vertebrae, this has not significantly changed.     CONCLUSION: 1.  Marked progression of malignancy with lymphangitic tumor throughout the left lung centrally most prominent in the left lower lobe. 2.  Marked progression of mediastinal and left hilar lymphadenopathy.       Total time spent was 40 minutes, more than half of it was in face-to-face counseling regarding disease state, treatment, side effects and management.      Signed by: Jemal Evans MD

## 2021-05-30 NOTE — TELEPHONE ENCOUNTER
[facility] PA team  [phone]  Pool: HE PA MED ([id number])          PA has been initiated.       PA form completed and faxed insurance via Cover My Meds     Key:  [id number]       Medication:  Contour Next Test strips    Insurance: [employer]           Response will be received via fax and may take up to 5-10 business days depending on plan

## 2021-05-30 NOTE — TELEPHONE ENCOUNTER
"Question following Office Visit  When did you see your provider: 7/9/2019  What is your question: Daughter states the mirtazapine made her sleep thru the day and when got up was very drowsy and \"dopey\". Will not take it again.  Also daughter states Medicare will only pay for testing of 3 times a day and her mother tests 5 times a day. Daughter is wondering if there is something provider can do to get this changed to 5 times a day.  Please advise.  Okay to leave a detailed message: Yes    "

## 2021-05-31 ENCOUNTER — RECORDS - HEALTHEAST (OUTPATIENT)
Dept: ADMINISTRATIVE | Facility: CLINIC | Age: 86
End: 2021-05-31

## 2021-05-31 VITALS — HEIGHT: 60 IN | BODY MASS INDEX: 21.65 KG/M2 | WEIGHT: 110.3 LBS

## 2021-05-31 VITALS — WEIGHT: 114.1 LBS | BODY MASS INDEX: 21.56 KG/M2

## 2021-05-31 VITALS — WEIGHT: 113.9 LBS | BODY MASS INDEX: 21.5 KG/M2 | HEIGHT: 61 IN

## 2021-05-31 VITALS — BODY MASS INDEX: 22.15 KG/M2 | HEIGHT: 61 IN | WEIGHT: 117.3 LBS

## 2021-05-31 VITALS — BODY MASS INDEX: 21.35 KG/M2 | WEIGHT: 113 LBS

## 2021-05-31 VITALS — BODY MASS INDEX: 20.62 KG/M2 | HEIGHT: 60 IN | WEIGHT: 105 LBS

## 2021-05-31 VITALS — BODY MASS INDEX: 21.6 KG/M2 | HEIGHT: 60 IN | WEIGHT: 110 LBS

## 2021-05-31 VITALS — WEIGHT: 115 LBS | BODY MASS INDEX: 21.71 KG/M2 | HEIGHT: 61 IN

## 2021-05-31 VITALS — BODY MASS INDEX: 22.01 KG/M2 | WEIGHT: 116.5 LBS

## 2021-05-31 VITALS — WEIGHT: 115.3 LBS | BODY MASS INDEX: 21.79 KG/M2

## 2021-05-31 VITALS — HEIGHT: 61 IN | WEIGHT: 113 LBS | BODY MASS INDEX: 21.34 KG/M2

## 2021-05-31 VITALS — WEIGHT: 106.3 LBS | BODY MASS INDEX: 20.09 KG/M2

## 2021-05-31 VITALS — BODY MASS INDEX: 21.66 KG/M2 | HEIGHT: 61 IN | WEIGHT: 114.7 LBS

## 2021-05-31 VITALS — BODY MASS INDEX: 21.88 KG/M2 | WEIGHT: 115.8 LBS

## 2021-05-31 VITALS — WEIGHT: 109.5 LBS | BODY MASS INDEX: 21.39 KG/M2

## 2021-05-31 VITALS — BODY MASS INDEX: 21.29 KG/M2 | WEIGHT: 109 LBS

## 2021-05-31 VITALS — BODY MASS INDEX: 20.32 KG/M2 | WEIGHT: 107.6 LBS | HEIGHT: 61 IN

## 2021-05-31 VITALS — BODY MASS INDEX: 19.99 KG/M2 | WEIGHT: 105.8 LBS

## 2021-05-31 VITALS — HEIGHT: 61 IN | WEIGHT: 114 LBS | BODY MASS INDEX: 21.52 KG/M2

## 2021-05-31 NOTE — TELEPHONE ENCOUNTER
Orders being requested: provider to agree to follow patient thru hospice care and sign death certificate when needed.  Reason service is needed/diagnosis: hospice   When are orders needed by: asap  Where to send Orders: Phone:  [facility] [phone]  Okay to leave detailed message?  Yes    Nurse is requesting the covering provider to address this message today.

## 2021-05-31 NOTE — PROGRESS NOTES
[facility] Hematology and Oncology Progress Note    Patient: Blanca Oreilly  MRN: [id number]  Date of Service: 8/19/2019           Reason for visit      1. Malignant neoplasm of upper lobe bronchus, right, PD-L1 and [id number] mutation positive.    NSCLC; EGFR and ALK-1 negative. PD-L1 strongly positive for both 22C3, 28-8    Assessment     1. Very pleasant 85 y.o. woman with stage IV NSCLC, adenocarcinoma. Negative for EGFR  And ALK-1.  PD-L1 she is strongly positive.  On strata testing she has BRA [id number] mutation.  November 2017 PET scan suggestive of liver metastases as well as mediastinal lymph node metastases.  She was prescribed and took few days of dabrafenib and Trimetinib in the second week of December 2017.  Stopped after few days.  Surprisingly her PET scan in January 2018 showed very good response.  CT scan in the beginning of April showed progression.  She resumed dabrafenib and Trimetinib.  Took them for a few days.  Then stopped but in April 2018 CT scan again shows that she has responded to the medication. So in May 2018 she went  back on treatment with dabrafenib and trimatinib but only one dose of each once a week.  In June 2018 frequency increased to 1 dose of each pill every 6 day.  Then in July 2018 she increase the frequency of taking one dose of each medication every fifth day.  Then she went to half the dose of Dabarafenib (75mg two times a day ) and full dose of Trametinib (2mg daily).  November 2018 scan showed stable to mild improvement in the mediastinal adenopathy.  She stopped dabrafenib and Trimetinib in January 2019 due to memory issues.  CT scan at that time showed that she may have slight progression in one mediastinal lymph node.  Convinced her to start taking it again and she started taking it again.  CT scan in April 2019 showed slight improvement.  However her CT scan in July 2019 showed worsening.  Now back on full dose of Trimetinib half dose of dabrafenib.  Seems to be  tolerating okay.  2.  History of prior treatment include 4 cycles of carboplatin, taxol and avastin. Then was on Alimta and Avastin.  Later on she has had repeat treatment with 4 cycles Carboplatin and Taxol. PET scan showed good response.  Started on Pembrolizumab but after 4 cycles there was some progression.  She then tried and progressed on Opdivo.  Then she was on Abraxane from November 2016 until April 2017. PET scan showed complete response. Stopped due to side effects. Her May 2017 PET scan showed progression.  She was on Atezulizumab from end of May beginning of October 2017. PET scan in November 2017 showed worsening of the liver metastases.  Was on dabrafenib and Trimetinib since December 2017.  3. History of gastric ulcer pathology was consistent with non-small cell lung cancer.   4. Elevated creatinine.  5. History of DVT and paroxysmal atrial fibrillation for which she is on anticoagulation.    6. Hyperglycemia. Partly due to Encorafenib.  7. Anxiety and depression      Plan     1. She will continue Binimetnib at 45 mg p.o. twice daily and Encorafenib Take 450 mg by mouth daily .  We will plan to see her back in 3 weeks.  Will be doing another CT scan in 2 months time.  Her CT scan done today was the baseline scan.  2. Continue supplemental oxygen  3. Continue anticoagulation but we will decrease the dose.    4. Good diet and exercise. Use insulin per scale.    Clinical stage      Lung cancer, Right side    Primary site: Lung (Right)    Staging method: AJCC 7th Edition    Clinical: Stage IV (T4, N0, M1a) signed by Jemal Evans MD on 10/20/2014  2:19 PM    Summary: Stage IV (T4, N0, M1a)      History     Blanca Oreilly is a very pleasant 85 y.o. old female with a history of  non-small cell lung cancer located in the right lung measuring 5.7 cm in size presenting with some shortness of breath associated with malignant pleural effusion in the month of October 2014.  Pleural tap was positive for  malignant cells adenocarcinoma in nature.  CK 7 positive CK 20 negative TTF-1 positive.  Subsequently that she was admitted due to worsening shortness of breath and her pleural effusion was tapped . She had Pleurx catheter placed but then removed as pleural fluid has reduced to few cc per week.   Her PET scan done at [facility] showed disease limited to thorax only. She has started on chemotherapy with carboplatin and Taxol with avastin. Has had 4 doses. Her PET scan showed nearly complete response. So we started her on Alimta and Avastin. She started that in February 2015.    She had CT in September 2015 which showed some worsening of subcrinal LN. Then had pet scan. That shows progression. Started back on Carboplatin and Taxol. We did give her Avastin but then we had to hold it due to protienuria. CT scan after 2 cycles showed improvement. PET scan after 4 showed partial response. She was tested for PD-L1 and was positive for both opdivo and Pembrolizumab. So we started on Pembrolizumab in late January 2016.  However PET scan after 3 cycles showed progression. So in April we started on Opdivo.    Was in hospital in the beginning of September 2016 with GI bleed and was found to have gastric ulcer.  She was started on PPI. Coumadin stopped.  In October 2016 her PET scan  showed progression.  Opdivo has been stopped.  Her PET scan also showed some fullness on her kidney.  Her creatinine had gone up.  She was then admitted and started on some IV fluids and seen by nephrology.  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  High doses of steroids actually give her diabetes.  In the meantime she also lost her  was battling myelodysplastic syndrome.  She had a follow-up endoscopy for the gastric ulcer and that came back positive for adenocarcinoma consistent with lung primary.  She has been started on salvage chemotherapy  with single agent Abraxane.  She had 1 cycle in November.  She is here for her next cycle.    She is managing her diabetes well.  Dr. Mayo started her on insulin.  Her blood sugars are getting better.    Tolerated Abraxane well. The scan after 3 cycles showed significant improvement in the mediastinal as well as abdominal lymphadenopathy.   She did have an abnormal stress test for which she ended up being in the hospital.  A reversible ischemic defect was noted.  She has been put on nitrates.  She is otherwise doing well.  She has not had any EGD since starting her chemotherapy.  She had PET scan in March which actually showed complete response.    She continued Abraxane however her neuropathy seems to have gotten worse.  We stopped treatment in April 2017.     PET scan in May 2017 showed progression. I recommended trying Tecentriq which she started end of May 2017.   PET scan in August 2017 showed partial response.  However in late September 2017 she started to have elevation of her liver function test which required me to stop her treatment.  In spite of high dose of prednisone her liver function test continue to get worse.  She had PET scan done November 2017 which showed that she had developed new liver metastases.      Due to the fact that her tumor molecular profiling on strata testing showed that she had BRAF   mutation I prescribed dabrafenib and Trimetinib.  She started those medications sometime in the first of the second week of December 2017. She was diagnosed to have a DVT in her left calf on December 7, 2017.  She has been started on anticoagulation.  She eventually was unable to handle those medications and stopped them I believe 12 December 2017.  Her PET scan in January 2018 actually showed that she had significant improvement in her disease which was surprising.  In my opinion it most likely represented delayed response to TECCENTRIQ or some spontaneous regression.  My impression at that  time was that it is unlikely that she responded to few pills of Trimetinib and dabrafenib.    Then in the beginning of April her CT scan showed progression mostly in the lymph nodes of the mediastinum and upper abdomen.  We discussed treatment options and interventions.  We discussed going to TECCENTRIQ or resuming dabrafenib and Trimetinib.  She eventually started with the oral pills.  She took them for about a week or so the last dose was sometime on 15 April 2018.  She was admitted at [facility] with fever which were felt to be secondary to these medications.    In May 2018 she was recommended to go back on treatment with dabrafenib and trimatinib but only one dose of each once a week. She is able to take it.  Then we decreased the time between the doses to every 6 day in middle of June 2018.  Then in August 2018 she increased it to half the dose of Dabarafenib (75mg two times a day ) and full dose of Trametinib (2mg daily).  She has been handling it well.  Her scan in November 2018 showed stable disease.    She comes in today for follow-up.  Since her last visit here in November 2018 she has had several issues with memory.  She has significant memory loss and recollection issues.  She has been seen by a neurologist.  She also had an MRI done which was negative for any metastatic disease.  She has been prescribed Namenda for memory issues.  She did stop taking her dabrafenib and Trimetinib in the last week of January 2019 as she felt that they might be causing her memory issue. Her CT showed slight progression. She is doing OK after restarting and she is also taking Aricept. Very happy with Aricept.      Her CT scan in April showed some improvement in the mediastinal lymph adenopathy.  She continue the treatment.  She came in last week after CT scan done on 12 July 2019 which actually showed significant progression.  We had a lengthy discussion.  I had recommended hospice care as the first choice.  The  patient is very adamantly opposed to that.  She wanted to continue treatment.  So as a first resort I asked her to go up to closer to the full dose of dabrafenib and Trimetinib.  She has been taking that since 19 July 2019.  Stopped that in the beginning of August.    We started her on  Binimetnib at 45 mg p.o. twice daily and Encorafenib Take 450 mg by mouth daily on the 9th of August. Comes in today after a scan. Having some nausea and upset stomach. But able to take full dose.  Her blood sugars have been slowly going up.  She is not eating very well.  Her weight however has gone up a little bit.  Minimal lower extremity edema.      Past Medical History     Past Medical History:   Diagnosis Date     A-fib (H)      Anemia      Cancer of lung (H)      Carotid stenosis      Chronic kidney disease      CKD (chronic kidney disease)      Coronary artery disease      Diabetes mellitus (H) 11/1/2016     Diabetes mellitus, type II (H)      Exacerbation of asthma 4/4/2007     High cholesterol      HTN (hypertension)      Hyperlipidemia      Hypertension      Peripheral neuropathy      Pleural effusion      Primary lung adenocarcinoma (H)     Stage IV NSCLC     Upper GI bleed      Review of Systems   Constitutional  Constitutional (WDL): Exceptions to WDL  Fatigue: Fatigue not relieved by rest, limiting self care ADLDenies any complaints.  Neurosensory  Neurosensory (WDL): Exceptions to WDL  Peripheral Motor Neuropathy: Moderate symptoms, limiting instrumental ADL(legs)  Ataxia: Moderate symptoms, limiting instrumental ADL(using a wheelchair)  Peripheral Sensory Neuropathy: Moderate symptoms, limiting instrumental ADL(feet and hands and around mouth)  Cardiovascular  Cardiovascular (WDL): Exceptions to WDL  Edema: Yes  Edema Limbs: 5 - 10% inter-limb discrepancy in volume or circumference at point of greatest visible difference, swelling or obscuration of anatomic architecture on close inspection(ankles)  Pulmonary  Dyspnea:  Shortness of breath at rest, limiting self care ADL  Hypoxia: Decreased oxygen saturation at rest (e.g., pulse oximeter <88% or PaO2 <(using 3 L of O2)  Gastrointestinal  Gastrointestinal (WDL): Exceptions to WDL  Anorexia: Oral intake altered without significant weight loss or malnutrition, oral nutritional supplements indicated  Dry Mouth: Symptomatic (e.g., dry or thick saliva) without significant dietary alteration, unstimulated saliva flow >0.2 ml/min  Genitourinary  Genitourinary (WDL): All genitourinary elements are within defined limits  Integumentary  Integumentary (WDL): All integumentary elements are within defined limits  Patient Coping  Patient Coping: Accepting  ECOG Performance   1  Pain Status  Currently in Pain: No/denies  Accompanied by  Accompanied by: Family Member(daughter)  Constitutional     Neurosensory     Eye      Ear     Cardiovascular     Pulmonary     Gastrointestinal     Genitourinary     Lymphatic     Musculoskeletal and Connective Tissue     Integumentary     Patient Coping  Patient Coping: Accepting  Accompanied by  Accompanied by: Family Member(daughter)  Oral Chemo Adherence           Vital Signs     Vitals:    08/19/19 1421   BP: (!) 203/75   Pulse: (!) 58   Temp: 97.5  F (36.4  C)   SpO2: 92%       Physical Exam     GENERAL: no acute distress. Cooperative in conversation.   HEENT: Starting to have alopecia. pupils are equal, round and reactive. Oral mucosa is clean and intact. No ulcerations or mucositis noted. No bleeding noted.  RESP:Chest symmetric lungs are clear bilaterally per auscultation. Regular respiratory rate. No wheezes or rhonchi.  Decreased air entry on the left side.  CV: Normal S1 S2 Regular, rate and rhythm.   ABD: Nondistended, soft, slight epigastric tenderness. Positive bowel sounds. No organomegaly.   EXTREMITIES: Trace bilateral lower extremity edema.  Noticing slight increased edema around the lower part of her ankles.  NEURO: non focal. Alert and oriented  x3.  No obvious neuro deficit.  PSYCH: within normal limits. No depression or anxiety.  SKIN: warm dry intact .      Lab Results     Results for orders placed or performed in visit on 08/19/19   Comprehensive Metabolic Panel   Result Value Ref Range    Sodium 138 136 - 145 mmol/L    Potassium 4.9 3.5 - 5.0 mmol/L    Chloride 96 (L) 98 - 107 mmol/L    CO2 37 (H) 22 - 31 mmol/L    Anion Gap, Calculation 5 5 - 18 mmol/L    Glucose 391 (H) 70 - 125 mg/dL    BUN 30 (H) 8 - 28 mg/dL    Creatinine 1.34 (H) 0.60 - 1.10 mg/dL    GFR MDRD Af Amer 46 (L) >60 mL/min/1.73m2    GFR MDRD Non Af Amer 38 (L) >60 mL/min/1.73m2    Bilirubin, Total 0.2 0.0 - 1.0 mg/dL    Calcium 9.3 8.5 - 10.5 mg/dL    Protein, Total 5.7 (L) 6.0 - 8.0 g/dL    Albumin 2.5 (L) 3.5 - 5.0 g/dL    Alkaline Phosphatase 59 45 - 120 U/L    AST 17 0 - 40 U/L    ALT 23 0 - 45 U/L   INR   Result Value Ref Range    INR 5.52 (HH) 0.90 - 1.10   HM1 (CBC with Diff)   Result Value Ref Range    WBC 19.6 (H) 4.0 - 11.0 thou/uL    RBC 3.91 3.80 - 5.40 mill/uL    Hemoglobin 10.5 (L) 12.0 - 16.0 g/dL    Hematocrit 34.6 (L) 35.0 - 47.0 %    MCV 89 80 - 100 fL    MCH 26.9 (L) 27.0 - 34.0 pg    MCHC 30.3 (L) 32.0 - 36.0 g/dL    RDW 15.2 (H) 11.0 - 14.5 %    Platelets 271 140 - 440 thou/uL    MPV 11.2 8.5 - 12.5 fL    Neutrophils % 73 (H) 50 - 70 %    Lymphocytes % 6 (L) 20 - 40 %    Monocytes % 10 2 - 10 %    Eosinophils % 11 (H) 0 - 6 %    Basophils % 1 0 - 2 %    Neutrophils Absolute 14.2 (H) 2.0 - 7.7 thou/uL    Lymphocytes Absolute 1.1 0.8 - 4.4 thou/uL    Monocytes Absolute 2.0 (H) 0.0 - 0.9 thou/uL    Eosinophils Absolute 2.1 (H) 0.0 - 0.4 thou/uL    Basophils Absolute 0.1 0.0 - 0.2 thou/uL     *Note: Due to a large number of results and/or encounters for the requested time period, some results have not been displayed. A complete set of results can be found in Results Review.         Distress assessment and ECOG status      ECOG Performance:    ECOG Performance Status:  "3    Distress Assessment  Distress Assessment Score: Extreme distress(\"my situation\")       Imaging Results       Ct Chest Without Contrast    Result Date: 8/14/2019  EXAM: CT CHEST WO CONTRAST LOCATION: [facility] DATE/TIME: 8/14/2019 10:21 AM INDICATION: Malignant neoplasm upper lobe bronchus, postinflammatory pulmonary fibrosis COMPARISON: CT chest exams 07/12/2019, 04/16/2019 TECHNIQUE: Helical images were obtained through the chest. Multiplanar reformats were obtained. Dose reduction techniques were used. CONTRAST: None. FINDINGS: LUNGS AND PLEURA: The central airways are clear. Increased small left-sided and new trace right-sided pleural effusions. Previously seen left perihilar masslike opacity measures 4.0 x 5.7 cm image 102 series 4 versus 4.1 x 5.2 cm previously. Associated diffuse left lung septal thickening and peribronchial vascular nodularity likely reflecting lymphangitic carcinomatosis is mildly increased. There is new right middle and lower lobe predominant septal thickening and peribronchial vascular nodularity. Increased medial right middle lobe and right upper lobe consolidation. Stable dominant 5 mm right lower lobe nodule image 78 series 3. MEDIASTINUM: Prominent heterogeneous thyroid. Superior mediastinal adenopathy again noted including a dominant prevascular node measuring 1.7 x 2.7 cm image 96 series 4, stable. A dominant enlarged left infrahilar node measuring 1.4 x 2.2 cm versus 1.4 x  2.0 cm previously. Lower right paraesophageal node measuring 1.5 x 2.4 cm versus 1.5 x 2.0 cm previously. Normal heart size and no pericardial effusion. Moderate LAD coronary artery calcifications. LIMITED UPPER ABDOMEN: Stable low-density left adrenal gland nodule measuring 1.2 cm, likely an adenoma. Stable severe right renal atrophy. MUSCULOSKELETAL: Stable T11 vertebral body compression deformity. Osteopenia. No definite suspicious osseous lesion.     CONCLUSION: 1.  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 2.  Mildly increased consolidation in the medial right upper and right middle lobes. New right middle and right lower lobar lymphangitic carcinomatosis. 3.  Increased small left-sided and trace right-sided pleural effusions. 4.  Mediastinal and left hilar adenopathy, slightly progressed since the previous exam.             Signed by: Jemal Evans MD

## 2021-05-31 NOTE — TELEPHONE ENCOUNTER
Dr Evans did get back to me today.  He stated that Blanca should be starting her medications.  She can start them tomorrow.  She should be taking the Binimetinib/Mektovi 45mg two times a day and the Encorafenib/Braftovi 450mg daily.  He reports that she can titrate the oxygen between 2 and 3 liters as needed.  They should  how she is doing, not by the oxygen saturation level that is showing but how she looks while she is breathing.  Deepika verbalized understanding and will call back if they need anything else.    Francisca Ortiz RN

## 2021-05-31 NOTE — TELEPHONE ENCOUNTER
Deepika calls in stating that the 2 new medications have been delivered.  They want to know how and when to take them.  These do not have as clear of instructions on them as the previous oral chemo she has taken.  I told them that Dr Evans has not been in clinic today.  As soon as I am able to touch base with him, which may be tomorrow I will get back to them.  She verbalized understanding.    She calls back in stating that her mom's oxygen level is sitting at 81-82% on 2 liters nasal cannula.  Her machine at home goes up to 3 liter's so I asked that she increase it to that.  We chatted a little more and by the time I was getting off the phone with her it was up to 92-93%.  I said that if she continued to increase into the higher 90's, that she could decrease her back down to 2 liters.  I did let her know that if her mother drops down to the low 80's to mid 80's again and is on the 3 liters, they need to seek emergency care in the ER.  Deepika verbalized understanding.  I told her that I would discuss this with Dr Evans as well and update them on his thoughts.    Francisca Ortiz RN

## 2021-05-31 NOTE — TELEPHONE ENCOUNTER
FYI - Status Update  Who is Calling: Child  Update: Wanted provider to know that her mother started hospice today.  Okay to leave a detailed message?:  No return call needed

## 2021-05-31 NOTE — TELEPHONE ENCOUNTER
Refill Approved    Rx renewed per Medication Renewal Policy. Medication was last renewed on 11/4/18.    Deidra Stahl, [facility] Triage/Med Refill 8/2/2019     Requested Prescriptions   Pending Prescriptions Disp Refills     NOVOLOG FLEXPEN U-100 INSULIN 100 unit/mL (3 mL) injection pen [Pharmacy Med Name: NOVOLOG 100 UNIT/ML FLEXPEN]  1     Sig: INJECT 4 UNITS UNDER THE SKIN 3 TIMES A DAY BEFORE MEALS.       Insulin/GLP-1 Refill Protocol Passed - 8/2/2019  7:06 AM        Passed - Visit with PCP or prescribing provider visit in last 6 months     Last office visit with prescriber/PCP: 7/9/2019 OR same dept: 7/9/2019 aMrco Garcia MD OR same specialty: 7/9/2019 Marco Garcia MD Last physical: Visit date not found Last MTM visit: Visit date not found     Next appt within 3 mo: Visit date not found  Next physical within 3 mo: Visit date not found  Prescriber OR PCP: Marco Garcia MD  Last diagnosis associated with med order: 1. Diabetes mellitus (H)  - NOVOLOG FLEXPEN U-100 INSULIN 100 unit/mL (3 mL) injection pen [Pharmacy Med Name: NOVOLOG 100 UNIT/ML FLEXPEN]; INJECT 4 UNITS UNDER THE SKIN 3 TIMES A DAY BEFORE MEALS.; Refill: 1    If protocol passes may refill for 6 months if within 3 months of last provider visit (or a total of 9 months).              Passed - A1C in last 6 months     Hemoglobin A1c   Date Value Ref Range Status   07/09/2019 8.6 (H) 3.5 - 6.0 % Final               Passed - Microalbumin in last year     Microalbumin, Random Urine   Date Value Ref Range Status   04/01/2019 3.99 (H) 0.00 - 1.99 mg/dL Final                  Passed - Blood pressure in last year     BP Readings from Last 1 Encounters:   08/01/19 (!) 216/84             Passed - Creatinine done in last year     Creatinine   Date Value Ref Range Status   07/30/2019 1.39 (H) 0.60 - 1.10 mg/dL Final

## 2021-05-31 NOTE — PATIENT INSTRUCTIONS - HE
At this time do not take Coumadin for 2 days.  After that start taking 3 mg of Coumadin every day.  Check INR one week after that.

## 2021-05-31 NOTE — TELEPHONE ENCOUNTER
PA for Braftovi and Mektovi were approved.     Medication Prior Authorization    Start Date:  7/31/19  Status:  Approved  Approval Date:  7/31/19  Authorization Effective Date:  5/2/19  Authorization Expiration Date:  7/30/22  Type:  New  Urgency:  Urgent  Med Type:  Specialty  Insurance Info:  Medicare Blue - Phone [phone] Fax [phone]  Method:  ePA  Reference #:  Key: [id number] - PA Case ID: [id number], (Key: [id number] - [id number]  Pharmacy Notified:  Yes  Patient Notified:  Yes  ___________________________________________________________________________________________________________________:

## 2021-05-31 NOTE — TELEPHONE ENCOUNTER
Deepika calls in to see what her mom is supposed to do regarding her coumadin dosing going forward.  She was off of it Monday, Tuesday and Wednesday in preparation for a tooth being pulled.  Per Dr Evans, she should resume at the same dosing she was on (alternating 3mg and 4mg) and have it rechecked next time she is in the clinic.  I told her to arrive at 1330 instead of 1400 on 8/19/19 so we can recheck her INR.  I have asked the schedulers to add the appt to her appt desk.  Deepika verbalized understanding.    Francisca Ortiz RN

## 2021-05-31 NOTE — PROGRESS NOTES
[facility] Hematology and Oncology Progress Note    Patient: Blanca Oreilly  MRN: [id number]  Date of Service: 8/1/2019           Reason for visit      1. Malignant neoplasm of upper lobe bronchus, right, PD-L1 and [id number] mutation positive.    NSCLC; EGFR and ALK-1 negative. PD-L1 strongly positive for both 22C3, 28-8    Assessment     1. Very pleasant 85 y.o. woman with stage IV NSCLC, adenocarcinoma. Negative for EGFR  And ALK-1.  PD-L1 she is strongly positive.  On strata testing she has BRA [id number] mutation.  November 2017 PET scan suggestive of liver metastases as well as mediastinal lymph node metastases.  She was prescribed and took few days of dabrafenib and Trimetinib in the second week of December 2017.  Stopped after few days.  Surprisingly her PET scan in January 2018 showed very good response.  CT scan in the beginning of April showed progression.  She resumed dabrafenib and Trimetinib.  Took them for a few days.  Then stopped but in April 2018 CT scan again shows that she has responded to the medication. So in May 2018 she went  back on treatment with dabrafenib and trimatinib but only one dose of each once a week.  In June 2018 frequency increased to 1 dose of each pill every 6 day.  Then in July 2018 she increase the frequency of taking one dose of each medication every fifth day.  Then she went to half the dose of Dabarafenib (75mg two times a day ) and full dose of Trametinib (2mg daily).  November 2018 scan showed stable to mild improvement in the mediastinal adenopathy.  She stopped dabrafenib and Trimetinib in January 2019 due to memory issues.  CT scan at that time showed that she may have slight progression in one mediastinal lymph node.  Convinced her to start taking it again and she started taking it again.  CT scan in April 2019 showed slight improvement.  However her CT scan in July 2019 showed worsening.  Now back on full dose of Trimetinib half dose of dabrafenib.  Seems to be  tolerating okay.  2.  History of prior treatment include 4 cycles of carboplatin, taxol and avastin. Then was on Alimta and Avastin.  Later on she has had repeat treatment with 4 cycles Carboplatin and Taxol. PET scan showed good response.  Started on Pembrolizumab but after 4 cycles there was some progression.  She then tried and progressed on Opdivo.  Then she was on Abraxane from November 2016 until April 2017. PET scan showed complete response. Stopped due to side effects. Her May 2017 PET scan showed progression.  She was on Atezulizumab from end of May beginning of October 2017. PET scan in November 2017 showed worsening of the liver metastases.  Was on dabrafenib and Trimetinib since December 2017.  3. History of gastric ulcer pathology is consistent with non-small cell lung cancer.  Clinically she seems to be doing well on that her hemoglobin is stable and she has not had any more blood in her stools.  4. Elevated creatinine.  5. History of DVT and paroxysmal atrial fibrillation for which she is on anticoagulation.    6. Anxiety and depression.      Plan     1. She will continue dabrafenib 150 mg p.o. daily and Trimetinib 2 mg daily.  I have also sent in a new prescription for Binimetnib at 45 mg p.o. twice daily and Encorafenib Take 450 mg by mouth daily for her.  Our clinic is trying to obtain the preauthorization and assistance for that.  We will plan to see her back in 2 weeks with a CT scan of the chest.  2. We also discussed the need for supplemental oxygen.  She is quite symptomatic from shortness of breath.  Her oxygen saturations when tested on 30 July 2019 were 93% on room air at rest.  However after it was did a little bit of a walk in the room it went down to 74% on room air.  With oxygen at 2 L/min her oxygen level went up to 98%.  With walking 2 L of oxygen her sats were still up to 95%.  She felt significantly better.  Therefore I think she requires oxygen at 2 L/min both during rest and  activity.  She is more than likely going to require this indefinitely.     I certify that this patient, Blanca Oreilly has been under my care and that I,  had a face-to-face encounter that meets face-to-face encounter requirements with this patient on 8/1/2019.    Blanca Oreilly is now in a chronic stable state and continues to require supplemental oxygen due to continued oxygen desaturation.  This patient has been treated in part, or in whole for the following medical condition(s) lung cancer with fibrosis.  Treatments tried and failed or ruled out to treat hypoxemia include none.  If portability is ordered, is the patient mobile within the home?  Yes    3. Continue anticoagulation per plan.    4. Good diet and exercise.    Clinical stage      Lung cancer, Right side    Primary site: Lung (Right)    Staging method: AJCC 7th Edition    Clinical: Stage IV (T4, N0, M1a) signed by Jemal Evans MD on 10/20/2014  2:19 PM    Summary: Stage IV (T4, N0, M1a)      History     Blanca Oreilly is a very pleasant 85 y.o. old female with a history of  non-small cell lung cancer located in the right lung measuring 5.7 cm in size presenting with some shortness of breath associated with malignant pleural effusion in the month of October 2014.  Pleural tap was positive for malignant cells adenocarcinoma in nature.  CK 7 positive CK 20 negative TTF-1 positive.  Subsequently that she was admitted due to worsening shortness of breath and her pleural effusion was tapped . She had Pleurx catheter placed but then removed as pleural fluid has reduced to few cc per week.   Her PET scan done at [facility] showed disease limited to thorax only. She has started on chemotherapy with carboplatin and Taxol with avastin. Has had 4 doses. Her PET scan showed nearly complete response. So we started her on Alimta and Avastin. She started that in February 2015.    She had CT in September 2015 which showed some worsening of subcrinal LN. Then had pet  scan. That shows progression. Started back on Carboplatin and Taxol. We did give her Avastin but then we had to hold it due to protienuria. CT scan after 2 cycles showed improvement. PET scan after 4 showed partial response. She was tested for PD-L1 and was positive for both opdivo and Pembrolizumab. So we started on Pembrolizumab in late January 2016.  However PET scan after 3 cycles showed progression. So in April we started on Opdivo.    Was in hospital in the beginning of September 2016 with GI bleed and was found to have gastric ulcer.  She was started on PPI. Coumadin stopped.  In October 2016 her PET scan  showed progression.  Opdivo has been stopped.  Her PET scan also showed some fullness on her kidney.  Her creatinine had gone up.  She was then admitted and started on some IV fluids and seen by nephrology.  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  High doses of steroids actually give her diabetes.  In the meantime she also lost her  was battling myelodysplastic syndrome.  She had a follow-up endoscopy for the gastric ulcer and that came back positive for adenocarcinoma consistent with lung primary.  She has been started on salvage chemotherapy with single agent Abraxane.  She had 1 cycle in November.  She is here for her next cycle.    She is managing her diabetes well.  Dr. Mayo started her on insulin.  Her blood sugars are getting better.    Tolerated Abraxane well. The scan after 3 cycles showed significant improvement in the mediastinal as well as abdominal lymphadenopathy.   She did have an abnormal stress test for which she ended up being in the hospital.  A reversible ischemic defect was noted.  She has been put on nitrates.  She is otherwise doing well.  She has not had any EGD since starting her chemotherapy.  She had PET scan in March which actually showed complete response.    She  continued Abraxane however her neuropathy seems to have gotten worse.  We stopped treatment in April 2017.     PET scan in May 2017 showed progression. I recommended trying Tecentriq which she started end of May 2017.   PET scan in August 2017 showed partial response.  However in late September 2017 she started to have elevation of her liver function test which required me to stop her treatment.  In spite of high dose of prednisone her liver function test continue to get worse.  She had PET scan done November 2017 which showed that she had developed new liver metastases.      Due to the fact that her tumor molecular profiling on strata testing showed that she had BRAF   mutation I prescribed dabrafenib and Trimetinib.  She started those medications sometime in the first of the second week of December 2017. She was diagnosed to have a DVT in her left calf on December 7, 2017.  She has been started on anticoagulation.  She eventually was unable to handle those medications and stopped them I believe 12 December 2017.  Her PET scan in January 2018 actually showed that she had significant improvement in her disease which was surprising.  In my opinion it most likely represented delayed response to TECCENTRIQ or some spontaneous regression.  My impression at that time was that it is unlikely that she responded to few pills of Trimetinib and dabrafenib.    Then in the beginning of April her CT scan showed progression mostly in the lymph nodes of the mediastinum and upper abdomen.  We discussed treatment options and interventions.  We discussed going to TECCENTRIQ or resuming dabrafenib and Trimetinib.  She eventually started with the oral pills.  She took them for about a week or so the last dose was sometime on 15 April 2018.  She was admitted at [facility] with fever which were felt to be secondary to these medications.    In May 2018 she was recommended to go back on treatment with dabrafenib and  trimatinib but only one dose of each once a week. She is able to take it.  Then we decreased the time between the doses to every 6 day in middle of June 2018.  Then in August 2018 she increased it to half the dose of Dabarafenib (75mg two times a day ) and full dose of Trametinib (2mg daily).  She has been handling it well.  Her scan in November 2018 showed stable disease.    She comes in today for follow-up.  Since her last visit here in November 2018 she has had several issues with memory.  She has significant memory loss and recollection issues.  She has been seen by a neurologist.  She also had an MRI done which was negative for any metastatic disease.  She has been prescribed Namenda for memory issues.  She did stop taking her dabrafenib and Trimetinib in the last week of January 2019 as she felt that they might be causing her memory issue. Her CT showed slight progression. She is doing OK after restarting and she is also taking Aricept. Very happy with Aricept.      Her CT scan in April showed some improvement in the mediastinal lymph adenopathy.  She continue the treatment.  She came in last week after CT scan done on 12 July 2019 which actually showed significant progression.  We had a lengthy discussion.  I had recommended hospice care as the first choice.  The patient is very adamantly opposed to that.  She wanted to continue treatment.  So as a first resort I asked her to go up to closer to the full dose of dabrafenib and Trimetinib.  She has been taking that since 19 July 2019.  She comes in today for follow-up.    She is complaining of some increased shortness of breath.  She feels winded.  She thinks he needs oxygen.  She felt significantly better after she was given supplemental oxygen in the clinic.  Her oxygen saturation on 30 July 2019 was documented to be 73% with activity.  It was 90% on room air.  She also had a tooth pulled.  She had held her Coumadin because of that.    Past Medical History      Past Medical History:   Diagnosis Date     A-fib (H)      Anemia      Cancer of lung (H)      Carotid stenosis      Chronic kidney disease      CKD (chronic kidney disease)      Coronary artery disease      Diabetes mellitus (H) 11/1/2016     Diabetes mellitus, type II (H)      Exacerbation of asthma 4/4/2007     High cholesterol      HTN (hypertension)      Hyperlipidemia      Hypertension      Peripheral neuropathy      Pleural effusion      Primary lung adenocarcinoma (H)     Stage IV NSCLC     Upper GI bleed      Review of Systems   Constitutional  Constitutional (WDL): Exceptions to WDL  Fatigue: Fatigue not relieved by rest - Limiting instrumental ADLDenies any complaints.  Neurosensory  Neurosensory (WDL): Exceptions to WDL  Peripheral Motor Neuropathy: Moderate symptoms, limiting instrumental ADL(legs)  Ataxia: Moderate symptoms, limiting instrumental ADL(using a walker)  Peripheral Sensory Neuropathy: Moderate symptoms, limiting instrumental ADL(feet and hands and occ around mouth)  Cardiovascular  Cardiovascular (WDL): Exceptions to WDL  Edema: Yes  Edema Limbs: 5 - 10% inter-limb discrepancy in volume or circumference at point of greatest visible difference, swelling or obscuration of anatomic architecture on close inspection(left ankle)  Pulmonary  Dyspnea: Shortness of breath at rest, limiting self care ADL  Hypoxia: Decreased oxygen saturation with exercise (e.g., pulse oximeter <88%), intermittent supplemental oxygen  Gastrointestinal  Gastrointestinal (WDL): Exceptions to WDL  Anorexia: Loss of appetite without alteration in eating habits  Genitourinary  Genitourinary (WDL): All genitourinary elements are within defined limits  Integumentary  Integumentary (WDL): All integumentary elements are within defined limits  Patient Coping  Patient Coping: Accepting  ECOG Performance   1  Pain Status  Currently in Pain: No/denies  Accompanied by  Accompanied by: Family Member(daughter)  Constitutional      Neurosensory     Eye      Ear     Cardiovascular     Pulmonary     Gastrointestinal     Genitourinary     Lymphatic     Musculoskeletal and Connective Tissue     Integumentary     Patient Coping  Patient Coping: Accepting  Accompanied by  Accompanied by: Family Member(daughter)  Oral Chemo Adherence           Vital Signs     Vitals:    08/01/19 1023   BP: (!) 216/84   Pulse: 68   Temp: 97.3  F (36.3  C)   SpO2: 92%       Physical Exam     GENERAL: no acute distress. Cooperative in conversation.   HEENT: Starting to have alopecia. pupils are equal, round and reactive. Oral mucosa is clean and intact. No ulcerations or mucositis noted. No bleeding noted.  RESP:Chest symmetric lungs are clear bilaterally per auscultation. Regular respiratory rate. No wheezes or rhonchi.    CV: Normal S1 S2 Regular, rate and rhythm.   ABD: Nondistended, soft, slight epigastric tenderness. Positive bowel sounds. No organomegaly.   EXTREMITIES: No lower extremity edema.  Noticing slight increased edema around the lower part of her ankles.  NEURO: non focal. Alert and oriented x3.  No obvious neuro deficit.  PSYCH: within normal limits. No depression or anxiety.  SKIN: warm dry intact .      Lab Results     Results for orders placed or performed in visit on 07/30/19   Comprehensive Metabolic Panel   Result Value Ref Range    Sodium 139 136 - 145 mmol/L    Potassium 4.3 3.5 - 5.0 mmol/L    Chloride 100 98 - 107 mmol/L    CO2 32 (H) 22 - 31 mmol/L    Anion Gap, Calculation 7 5 - 18 mmol/L    Glucose 268 (H) 70 - 125 mg/dL    BUN 34 (H) 8 - 28 mg/dL    Creatinine 1.39 (H) 0.60 - 1.10 mg/dL    GFR MDRD Af Amer 44 (L) >60 mL/min/1.73m2    GFR MDRD Non Af Amer 36 (L) >60 mL/min/1.73m2    Bilirubin, Total 0.3 0.0 - 1.0 mg/dL    Calcium 10.3 8.5 - 10.5 mg/dL    Protein, Total 6.8 6.0 - 8.0 g/dL    Albumin 3.0 (L) 3.5 - 5.0 g/dL    Alkaline Phosphatase 81 45 - 120 U/L    AST 21 0 - 40 U/L    ALT 19 0 - 45 U/L   INR   Result Value Ref Range    INR  1.20 (H) 0.90 - 1.10   HM1 (CBC with Diff)   Result Value Ref Range    WBC 8.7 4.0 - 11.0 thou/uL    RBC 4.86 3.80 - 5.40 mill/uL    Hemoglobin 12.9 12.0 - 16.0 g/dL    Hematocrit 42.6 35.0 - 47.0 %    MCV 88 80 - 100 fL    MCH 26.5 (L) 27.0 - 34.0 pg    MCHC 30.3 (L) 32.0 - 36.0 g/dL    RDW 15.0 (H) 11.0 - 14.5 %    Platelets 240 140 - 440 thou/uL    MPV 10.5 8.5 - 12.5 fL    Neutrophils % 66 50 - 70 %    Lymphocytes % 8 (L) 20 - 40 %    Monocytes % 11 (H) 2 - 10 %    Eosinophils % 14 (H) 0 - 6 %    Basophils % 1 0 - 2 %    Neutrophils Absolute 5.7 2.0 - 7.7 thou/uL    Lymphocytes Absolute 0.7 (L) 0.8 - 4.4 thou/uL    Monocytes Absolute 1.0 (H) 0.0 - 0.9 thou/uL    Eosinophils Absolute 1.2 (H) 0.0 - 0.4 thou/uL    Basophils Absolute 0.1 0.0 - 0.2 thou/uL     *Note: Due to a large number of results and/or encounters for the requested time period, some results have not been displayed. A complete set of results can be found in Results Review.         Distress assessment and ECOG status      ECOG Performance:    ECOG Performance Status: 2    Distress Assessment  Distress Assessment Score: 8(trouble breathing)       Imaging Results       Ct Chest Without Contrast    Result Date: 7/12/2019  EXAM: CT CHEST WO CONTRAST LOCATION: [facility] DATE/TIME: 7/12/2019 4:03 PM INDICATION: Neoplasm: chest, lung, rx monitor or f/u COMPARISON: None. TECHNIQUE: Helical images were obtained through the chest. Multiplanar reformats were obtained. Dose reduction techniques were used. CONTRAST: None. FINDINGS: LUNGS AND PLEURA: No significant progression of tumor since 04/16/2019. Multiple tiny nodules throughout the left lower lobe and upper lobe with nodular pleural thickening consistent with lymphangitic spread of tumor throughout the left lung most prominent in the left lower lobe. New tiny left pleural effusion. MEDIASTINUM: Progression of mediastinal lymphadenopathy and left hilar lymphadenopathy. The previous 11 mm lymph  node adjacent previous lower esophagus now measures 2.0 cm. Bulky AP window lymph nodes with a conglomeration of 2 nodes on image 96 measuring 3.2 x 2.5 cm. Bulky left hilar infiltrative adenopathy has markedly progressed. LIMITED UPPER ABDOMEN: No significant findings. MUSCULOSKELETAL: No concerning bone lesions. Mild compression fracture T11 vertebrae, this has not significantly changed.     CONCLUSION: 1.  Marked progression of malignancy with lymphangitic tumor throughout the left lung centrally most prominent in the left lower lobe. 2.  Marked progression of mediastinal and left hilar lymphadenopathy.     Total time spent was 40 minutes, more than half of it was in face-to-face counseling regarding disease state, treatment, side effects and management.  There is also additional time spent in face-to-face documentation regarding her oxygen requirement.        Signed by: Jemal Evans MD

## 2021-06-01 VITALS — WEIGHT: 113.2 LBS | BODY MASS INDEX: 22.86 KG/M2

## 2021-06-01 VITALS — HEIGHT: 61 IN | WEIGHT: 111 LBS | BODY MASS INDEX: 20.96 KG/M2

## 2021-06-01 VITALS — BODY MASS INDEX: 23.17 KG/M2 | WEIGHT: 114.7 LBS

## 2021-06-01 VITALS — WEIGHT: 109.4 LBS | BODY MASS INDEX: 22.1 KG/M2

## 2021-06-01 VITALS — BODY MASS INDEX: 21.97 KG/M2 | HEIGHT: 59 IN | WEIGHT: 109 LBS

## 2021-06-01 VITALS — WEIGHT: 112.6 LBS | BODY MASS INDEX: 22.74 KG/M2

## 2021-06-01 VITALS — WEIGHT: 113.2 LBS | BODY MASS INDEX: 21.39 KG/M2

## 2021-06-01 VITALS — WEIGHT: 110.3 LBS | BODY MASS INDEX: 22.28 KG/M2

## 2021-06-02 VITALS — HEIGHT: 59 IN | WEIGHT: 114.9 LBS | BODY MASS INDEX: 23.16 KG/M2

## 2021-06-02 VITALS — WEIGHT: 112 LBS | BODY MASS INDEX: 22.62 KG/M2

## 2021-06-02 VITALS — BODY MASS INDEX: 23.23 KG/M2 | WEIGHT: 115 LBS

## 2021-06-02 VITALS — WEIGHT: 112.44 LBS | HEIGHT: 60 IN | BODY MASS INDEX: 22.07 KG/M2

## 2021-06-02 VITALS — BODY MASS INDEX: 23.21 KG/M2 | WEIGHT: 114.9 LBS

## 2021-06-02 VITALS — WEIGHT: 117 LBS | BODY MASS INDEX: 23.63 KG/M2

## 2021-06-03 VITALS — BODY MASS INDEX: 22.23 KG/M2 | WEIGHT: 113.8 LBS

## 2021-06-03 VITALS — BODY MASS INDEX: 22.77 KG/M2 | WEIGHT: 116.6 LBS

## 2021-06-03 VITALS — WEIGHT: 111.2 LBS | BODY MASS INDEX: 21.72 KG/M2

## 2021-06-03 VITALS — BODY MASS INDEX: 21.99 KG/M2 | HEIGHT: 60 IN | WEIGHT: 112 LBS

## 2021-06-03 VITALS — HEIGHT: 60 IN | BODY MASS INDEX: 22.77 KG/M2

## 2021-06-08 NOTE — PROGRESS NOTES
"ACUPUNCTURIST TREATMENT NOTE    Name: Blanca Oreilly  :  1933  MRN:  [id number]    Acupuncture Treatment  Patient Type: WW CCC  Intervention Reason: Neuropathy, Neuropathy 2  Pre-session Neuropathy ratin  Post-session Neuropathy ratin  Pre-session Neuropathy 2 rating: 10  Post-session Neuropathy 2 rating: 10  Patient complaint:: neuropathy in hands and feet  Acupunture (Points):: LI 10, TW 4, Ba Lurdes, St 36, Ki 10, Sp 9, Ki 7, Ki 3, Ba Kranthi  Practitioner Observed: 30 minute treatment  Checklist: Progress Note Completed, Consent Reveiwed    \"Risks and benefits of acupuncture were discussed with patient. Consent for treatment was given. We thank you for the referral.\"     Mirian Dawson L.Ac.    Date:  2017  Time:  3:08 PM    "

## 2021-06-08 NOTE — PROGRESS NOTES
"ACUPUNCTURIST TREATMENT NOTE    Name: Blanca Oreilly  :  1933  MRN:  [id number]    Acupuncture Treatment  Patient Type: WW CCC  Intervention Reason: Neuropathy, Neuropathy 2  Pre-session Neuropathy rating: 10  Post-session Neuropathy ratin  Pre-session Neuropathy 2 rating: 10  Post-session Neuropathy 2 rating: 10  Patient complaint:: Neuropathy of hands and feet  Acupunture (Points):: Ba marion, Tb 5, 6, Ba lois, SP 3, Gb 41  Practitioner Observed: 30 minute treatment.  Checklist: Progress Note Completed, Consent Reveiwed    \"Risks and benefits of acupuncture were discussed with patient. Consent for treatment was given. We thank you for the referral.\"     Shar Sandoval    Date:  1/10/2017  Time:  12:06 PM    "

## 2021-06-08 NOTE — PROGRESS NOTES
"ACUPUNCTURIST TREATMENT NOTE    Name: Blanca Oreilly  :  1933  MRN:  [id number]    Acupuncture Treatment  Patient Type: WW CCC  Intervention Reason: Neuropathy, Neuropathy 2  Pre-session Neuropathy ratin  Post-session Neuropathy ratin  Pre-session Neuropathy 2 ratin  Post-session Neuropathy 2 ratin  Patient complaint:: neuropathy in hands and feet  Acupunture (Points):: LI 10, LI 4, Ba Lurdes, St 36, GB 34, Maida 6, Maida 3, Ba Kranthi  Practitioner Observed: 30 minute treatment  Checklist: Progress Note Completed, Consent Reveiwed    \"Risks and benefits of acupuncture were discussed with patient. Consent for treatment was given. We thank you for the referral.\"     Mirian Dawson L.Ac.    Date:  2017  Time:  2:19 PM    "

## 2021-06-08 NOTE — PROGRESS NOTES
ASSESSMENT:  1.  Hospital follow-up:  Blanca's hospital course was reviewed.  She originally had been scheduled for an outpatient pharmacologic stress test due to complaints of chest pain.  She developed SVT on 2 occasions following the test,  and was hospitalized for observation and management.  A transient episode of paroxysmal atrial fibrillation also was noted.  The stress test did reveal a small area of reversible ischemia.  She was placed on isosorbide mononitrate for medical management of angina.  Metoprolol dose also was increased.  She was advised to start low-dose aspirin.  She has been reluctant to do so due to a history of upper GI bleeding related to a metastatic lesion in the stomach due to lung cancer.  2.  Metastatic lesion in the stomach:  Repeat upper GI endoscopy had been advised.  I would favor proceeding with this when okay with Dr. Evans.  3.  Metastatic adenocarcinoma of the lung  I have reconciled all medications.    PLAN:  1.  Continue metoprolol succinate 75 mg daily.  The dose could be increased higher if tachyarrhythmias are noted in the future.  2.  Continue amlodipine and isosorbide mononitrate at current doses  3.  Try sublingual nitroglycerin if chest discomfort recurs.  This  should be taken sitting or lying down  4.  Clinic follow-up in one month    There are no discontinued medications.        ASSESSED PROBLEMS:  No diagnosis found.    CHIEF COMPLAINT:  Chief Complaint   Patient presents with     Hospital Visit Follow Up     from stress test       HISTORY OF PRESENT ILLNESS:  Blanca is a 83 y.o. female presenting to the clinic today for a hospital visit follow up. She presented to [facility] on 1/18/17 with syncope and tachycardia. She was undergoing a stress test when she went into SVT and felt faint. She was assisted to the ER at [place]. She had been expericing chest pain one week prior which is why she was undergoing the stress test. She had a ventricular rate of 221  "when she vent into SVT. An EKG showed atrial fibrillation with rapid ventricular response and non-specific ST abnormality. Chest x-ray was normal. A CT of her chest was taken and showed coronary artery calcification. It was suggestive of pulmonary hypertension. Nuclear stress test was normal. She was admitted or observation. Metroprolol was increased to 100 mg. She was discharged yesterday, 1/19/17, with recommendation to continue aspirin, Imdur, metroprolol and Lipitor. She is going to hold aspirin because she had a repeat EGD. She was told to follow up with cardiology. She is still experiencing chest pain and admits she said she was not during her hospital discharge. She is not taking Carafate and is decreasing amlodipine.       REVIEW OF SYSTEMS:   Blood sugar controlled. All other systems are negative.    PFSH:  She did not feel like she could trust anyone at the hospital.     TOBACCO USE:  History   Smoking Status     Former Smoker     Packs/day: 0.10     Years: 30.00     Quit date: 10/30/1980   Smokeless Tobacco     Never Used     Comment: Not a steady smoker       VITALS:  Vitals:    01/20/17 1550 01/20/17 1628   BP: 124/50    Patient Site: Left Arm    Patient Position: Sitting    Cuff Size: Adult Regular    Pulse: 88 72   Weight: 121 lb 11.2 oz (55.2 kg)    Height: 5' 1\" (1.549 m)      Wt Readings from Last 3 Encounters:   01/20/17 121 lb 11.2 oz (55.2 kg)   01/19/17 113 lb 9.6 oz (51.5 kg)   01/16/17 118 lb (53.5 kg)     Body mass index is 23 kg/(m^2).    PHYSICAL EXAM:  Constitutional:   Reveals an alert, pleasant elderly woman. Affect appropriate. No acutely ill.  Vitals: per nursing notes.  HEENT:  Chemo induced alopecia   Back:  No spine or CVA pain.  Neck: No carotid bruits or adenopathy.   Lungs: Clear to A&P.   Cardiac: Regular rhythm.  I-II/VI systolic murmur left sternal border.  Pulse 72  Abdomen: soft  Extremities: No edema   Skin: Pale     ADDITIONAL HISTORY SUMMARIZED (2): Reviewed note from " 1/16/17 regarding stress trest. Reviewed cardiology note from 1/19/17 regarding SVT.   DECISION TO OBTAIN EXTRA INFORMATION (1): None.   RADIOLOGY TESTS (1): Chest x-ray report from 1/18/17.  LABS (1): None.  MEDICINE TESTS (1): Stress test from 1/18/17.  INDEPENDENT REVIEW (2 each): None.       The visit lasted a total of 30 minutes face to face with the patient. Over 50% of the time was spent counseling and educating the patient about supraventricular tachycardia.    I, Caitlin Sweenye, am scribing for and in the presence of, Dr. Garcia.    I, Dr. Garcia, personally performed the services described in this documentation, as scribed by Caitlin Sweeney in my presence, and it is both accurate and complete.    Dragon dictation was used for this note.  Speech recognition errors are a possibility.    MEDICATIONS:  Current Outpatient Prescriptions   Medication Sig Dispense Refill     amLODIPine (NORVASC) 5 MG tablet Take 0.5 tablets (2.5 mg total) by mouth daily. 90 tablet 3     aspirin 81 MG EC tablet Take 1 tablet (81 mg total) by mouth daily with breakfast.  0     atorvastatin (LIPITOR) 80 MG tablet One half daily (Patient taking differently: Take 40 mg by mouth bedtime. ) 90 tablet 3     isosorbide mononitrate (IMDUR) 30 MG 24 hr tablet Take 1 tablet (30 mg total) by mouth daily. 30 tablet 1     LANTUS SOLOSTAR 100 unit/mL (3 mL) pen Inject 10 Units under the skin bedtime. 11.65 Type 2 with hyperglycemia 3 mL PRN     magnesium 250 mg Tab Take 500 mg by mouth daily.        metoprolol succinate (TOPROL-XL) 50 MG 24 hr tablet Take 1.5 tablets (75 mg total) by mouth bedtime. 90 tablet 3     multivitamin (ONE A DAY) per tablet Take 1 tablet by mouth daily.       nitroglycerin (NITROSTAT) 0.4 MG SL tablet Place 1 tablet (0.4 mg total) under the tongue every 5 (five) minutes as needed for chest pain. 10 tablet 1     ranitidine (ZANTAC) 150 MG tablet TAKE 1 TABLET BY MOUTH TWICE DAILY (Patient taking differently: Take  150 mg by mouth 2 (two) times a day. ) 180 tablet 3     sucralfate (CARAFATE) 100 mg/mL suspension Take 10 mL (1 g total) by mouth 4 (four) times a day with meals and bedtime. (Patient taking differently: Take 1 g by mouth 4 (four) times a day as needed. ) 420 mL 1     No current facility-administered medications for this visit.        Total data points: 5

## 2021-06-08 NOTE — PROGRESS NOTES
ASSESSMENT:  1.  Type 2 diabetes mellitus:  Blood sugars are much improved off steroids.  She primarily notes a bump with chemotherapy.  Hemoglobin A1c today is in the normal range at 5.6.  I would favor stopping her short acting insulin aside from her day of chemotherapy.  2.  Chest pain:  Causes uncertain.  She did have coronary calcification noted on her CT and is concerned about her risk for ischemic heart disease.  A pharmacologic stress test seems reasonable.  3.  Hyperlipidemia:  Lipitor was cut from 80-40 mg daily.  A lipid profile will be checked today  4.  Abdominal discomfort:  This was transient and has not recurred.  She was noted to have metastatic adenocarcinoma to the abdominal wall which caused previous bleeding.  She was advised to try Carafate if abdominal pain recurs    PLAN:  1.  Lipid profile and hemoglobin A1c.  Recent labs ordered by Dr. Evans were reviewed  2.  Stop short acting insulin except for the day of chemotherapy  3.  Schedule pharmacologic stress test.  Skip metoprolol the night before the test  4.  Continue ranitidine.  Add Carafate 1 g slurry 4 times daily if abdominal pain recurs  5.  She will be notified of lab and stress test results.  Clinic follow-up in 2 months or as needed  Orders Placed This Encounter   Procedures     Glycosylated Hemoglobin A1c     [facility] RED     Lipid Cascade     Order Specific Question:   Fasting is required?     Answer:   Unknown     There are no discontinued medications.    No Follow-up on file.    ASSESSED PROBLEMS:  1. Diabetes mellitus  Glycosylated Hemoglobin A1c    [facility] RED    Lipid Cascade   2. Chest pain  NM Pharmacologic Stress Test   3. Gastric ulcer  sucralfate (CARAFATE) 100 mg/mL suspension   4. Hyperlipidemia  Lipid Cascade       CHIEF COMPLAINT:  Chief Complaint   Patient presents with     Shoulder Pain     left shoulder pain, chest discomfort, also abdominal discomfort       HISTORY OF PRESENT ILLNESS:  Blanca is a 83 y.o. female presenting  to the clinic today with her daughter for a general follow up with complaints of chest and abdominal pain. She had one episode of chest pain that she believes is cardiac. It last several hours but did not vary with a deep breath. The pain moved around a little and there was some heavy pressure. The pain varied in severity but caused her to lay awake at night. Chest pains come and go but she has not experienced any in the last two days. She is able to move around without pain. She also had abdominal discomfort where her ulcer is located. The abdominal pain was steady and did not move. Abdominal pain has since resolved but it was painful enough to keep her awake at night.     Diabetes: Blood glucose is labile. She eats constantly by grazing. Her last A1c in October was 5.6 and is the same today. Her last glucose was 59 on January 12. She denies any symptomatic hypoglycemia. She admits she does not follow a schedule well but takes her insulin in the morning and then checks her glucose 3-4 hours later. She finds using insulin inconvenient. In the morning her glucose is between . However in the afternoon glucose is labile ranging anywhere from 76 to 282. Blood glucose rises after chemotherapy. She has constant numbness and burning in her hands and feet.     Metastatic Cancer: Her last session of chemotherapy was January 12. She is tolerating the later chemo times better. She notes her last scan showed some receding lymph nodes.     Hyperlipidemia: Atorvastatin was decreased to 40 mg at her last visit.     Ulcer: She will have a subsequent endoscopy per Dr. Evans. She remains on Zantac. She had one bout of abdominal pain as above.     REVIEW OF SYSTEMS:   She is otherwise feeling well. She has trouble sleeping. All other systems are negative.    PFSH:  She will be visiting her other daughter in [place], [place] in March.     TOBACCO USE:  History   Smoking Status     Former Smoker     Packs/day: 0.10     Years: 30.00  "    Quit date: 10/30/1980   Smokeless Tobacco     Never Used     Comment: Not a steady smoker       VITALS:  Vitals:    01/16/17 0850   BP: 132/56   Patient Site: Left Arm   Patient Position: Sitting   Cuff Size: Adult Regular   Pulse: 88   Weight: 118 lb 12.8 oz (53.9 kg)   Height: 5' 1\" (1.549 m)     Wt Readings from Last 3 Encounters:   01/16/17 118 lb 12.8 oz (53.9 kg)   01/12/17 118 lb 6.4 oz (53.7 kg)   12/21/16 115 lb (52.2 kg)     Body mass index is 22.45 kg/(m^2).    PHYSICAL EXAM:  Constitutional:   Reveals an alert, pleasant, slender elderly woman. Affect appropriate. Does not appear acutely ill.  Vitals: per nursing notes.  HEENT: Atraumatic.    Oropharynx:   Mouth and throat clear, no thrush or ulcers.  Neck: No carotid bruits or adenopathy.  Back:  No spine or CVA pain.  Thorax:  No bony deformities.  Lungs: Clear to A&P.   Cardiac:   Regular rate and rhythm, normal S1, S2, no murmur.  Abdomen:  Soft, no mass or tenderness.  Extremities:   No peripheral edema, pulses in the feet intact.    Skin: Somewhat pale but clear.   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   Psychiatric:  Memory intact, mood appropriate.    Diabetic Foot Exam: Pulses in the feet intact, no foot ulcers, stocking neuropathy to light touch in the feet.    ADDITIONAL HISTORY SUMMARIZED (2): Oncology note 1/12/17.   DECISION TO OBTAIN EXTRA INFORMATION (1): None.   RADIOLOGY TESTS (1): CT report 12/21/16.   LABS (1): Labs ordered.   MEDICINE TESTS (1): None.  INDEPENDENT REVIEW (2 each): None.     The visit lasted a total of 26 minutes face to face with the patient. Over 50% of the time was spent counseling and educating the patient about chest pain and diabetes.    ISilva, am scribing for and in the presence of, Dr. Garcia.    I, Dr. Garcia, personally performed the services described in this documentation, as scribed by Silva Doran in my presence, and it is both accurate and complete.    Dragon dictation " was used for this note.  Speech recognition errors are a possibility.    MEDICATIONS:  Current Outpatient Prescriptions   Medication Sig Dispense Refill     amLODIPine (NORVASC) 5 MG tablet Take 1 tablet (5 mg total) by mouth daily. 90 tablet 3     atorvastatin (LIPITOR) 80 MG tablet One half daily 90 tablet 3     LANTUS SOLOSTAR 100 unit/mL (3 mL) pen Inject 10 Units under the skin bedtime. 11.65 Type 2 with hyperglycemia 3 mL PRN     magnesium 250 mg Tab Take 500 mg by mouth daily.        metoprolol succinate (TOPROL-XL) 50 MG 24 hr tablet Take 1 tablet (50 mg total) by mouth bedtime. 90 tablet 3     NOVOLOG FLEXPEN 100 unit/mL injection pen Check blood sugar four (4) times daily.  11.65 Type 2 with hyperglycemia  Flex Pen Needles - BD Ultra-fine Mary Pen Needles - NDC [address]-30 - dispense 1 case, refill PRN for 1 year  No supplies needed 3 mL PRN     ranitidine (ZANTAC) 150 MG tablet TAKE 1 TABLET BY MOUTH TWICE DAILY 180 tablet 3     sucralfate (CARAFATE) 100 mg/mL suspension Take 10 mL (1 g total) by mouth 4 (four) times a day with meals and bedtime. 420 mL 1     No current facility-administered medications for this visit.        Total data points: 4

## 2021-06-08 NOTE — PROGRESS NOTES
"Outpatient here for nuclear lexiscan stress test, walking protocol.  About 1 min after lexiscan injected ecg showed onset of SVT with rate 221/min.  Pt alert, c/o SOB and \"I'm going to pass out\".  Pt immediately assisted off treadmill and instructed to cough (vagal maneuver).  Internal Code 9 called.  After 44 secs the SVT broke, with HR 130s and Code 9 team present. /77.   ECG show sinus tachy, freq PACs and occ. Burst (2-4 beats) PAT.  Pt alert, c/o chest pain 1 of 10.  50mg aminophylline given to reverse lexiscan effects.  Pt's daughter in room with patient.  Code 9 M.D. requests pt be brought to ED for evaluation.  Chest pain present at 1 of 10 with /72 and 181/83.  While pt being changed to portable ecg the pt stood, became pale, & quickly passed out.  MD still at beside.  Pt was laid down and quickly regained consiusness.  Second Code 9 called as prior team members except MD had left.  Pt immed. Back on ecg.  Continues in sinus tachy with PACs.  Pt now pink, warm and dry.  A&O.  Pt transported to ED via cart and on monitor and report given.  Pt's daughter and belongings with pt.  "

## 2021-06-08 NOTE — PROGRESS NOTES
Patient arrived ambulatory, accompanied by daughter, after seeing MD.  IV started in right forearm vein without difficulty - excellent blood return noted.  IV of NS initiated for treatment.  Given premed IVP as ordered.  Given Abraxane IVPB over 30 minutes.  Vein flushed with NS until tubing clear.  Needle dced and dressing applied.  Patient left ambulatory, with daughter, in stable condition.  Instructed to call with questions/concerns/problems.  Patient verbalized understanding.

## 2021-06-08 NOTE — PROGRESS NOTES
"ACUPUNCTURIST TREATMENT NOTE    Name: Blanca Oreilly  :  1933  MRN:  [id number]    Acupuncture Treatment  Patient Type: WW CCC  Intervention Reason: Neuropathy, Neuropathy 2  Pre-session Neuropathy ratin  Post-session Neuropathy ratin  Pre-session Neuropathy 2 ratin  Post-session Neuropathy 2 ratin  Patient complaint:: neuropahy in fingers and feet  Acupunture (Points):: LI [address], [place], [address], [address],   Practitioner Observed: 30 minute treatment  Checklist: Progress Note Completed, Consent Reveiwed    \"Risks and benefits of acupuncture were discussed with patient. Consent for treatment was given. We thank you for the referral.\"     Mirian Dawson L.Ac.    Date:  2017  Time:  2:24 PM    "

## 2021-06-08 NOTE — PROGRESS NOTES
[facility] Hematology and Oncology Progress Note    Patient: Blanca Oreilly  MRN: [id number]  Date of Service: 1/12/2017            Reason for visit      1. Chronic Renal Insufficiency    2. Malignant neoplasm of upper lobe bronchus, right    NSCLC; EGFR and ALK-1 negative. PD-L1 strongly positive for both 22C3, 28-8    Assessment     1. Very pleasant 83 y.o. woman with stage IV NSCLC, adenocarcinoma. Negative for EGFR  And ALK-1. We also checked PD-L1 and she is strongly positive.  2. Initially had 4 cycles of carboplatin, taxol and avastin. Then was on Alimta and Avastin.  Her CT scan showed slight increased precrinal and subcrinal nodes.  PET scan also confirmed progression. She has had 4 cycles Carboplatin and Taxol. PET scan showed good response.  Started on Pembrolizumab but after 4 cycles there was some progression.  She then tried and progressed on Opdivo.  Currently she is on Abraxane since November 2016. CT scan is showing good response.  3. Gastric ulcer pathology is consistent with non-small cell lung cancer .  4. Renal failure for which she was on prednisone and it is getting stable.  5. Diabetes which is most likely secondary to steroids that she was on.  This has made her quite upset.  6. Anxiety and depression.  7. Lack of proper sleep due to her 's passing away as well as her diabetes.    Plan     1. Continue Abraxane.  She will come back in 3 weeks. Plan to do another scan after 6th cycle.  2. RTC in 3 weeks.  3. Continue insulin as directed to manage her blood sugars.  4. Continue with her other medication.    Clinical stage      Lung cancer, Right side    Primary site: Lung (Right)    Staging method: AJCC 7th Edition    Clinical: Stage IV (T4, N0, M1a) signed by Jemal Evans MD on 10/20/2014  2:19 PM    Summary: Stage IV (T4, N0, M1a)      History     Blanca Oreilly is a very pleasant 83 y.o. old female with a history of  non-small cell lung cancer located in the right lung measuring  5.7 cm in size presenting with some shortness of breath associated with malignant pleural effusion in the month of October 2014.  Pleural tap was positive for malignant cells adenocarcinoma in nature.  CK 7 positive CK 20 negative TTF-1 positive.  Subsequently that she was admitted due to worsening shortness of breath and her pleural effusion was tapped . She had Pleurx catheter placed but then removed as pleural fluid has reduced to few cc per week.   Her PET scan done at [facility] showed disease limited to thorax only. She has started on chemotherapy with carboplatin and Taxol with avastin. Has had 4 doses. Her PET scan showed nearly complete response. So we started her on Alimta and Avastin. She started that in February 2015.    She had CT in September 2015 which showed some worsening of subcrinal LN. Then had pet scan. That shows progression. Started back on Carboplatin and Taxol. We did give her Avastin but then we had to hold it due to protienuria. CT scan after 2 cycles showed improvement. PET scan after 4 showed partial response. She was tested for PD-L1 and was positive for both opdivo and Pembrolizumab. So we started on Pembrolizumab in late January 2016.  However PET scan after 3 cycles showed progression. So in April we started on Opdivo.    Was in hospital in the beginning of September 2016 with GI bleed and was found to have gastric ulcer.  She was started on PPI. Coumadin stopped.  In October 2016 her PET scan  showed progression.  Opdivo has been stopped.  Her PET scan also showed some fullness on her kidney.  Her creatinine had gone up.  She was then admitted and started on some IV fluids and seen by nephrology.  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  High doses of steroids actually give her diabetes.  In the meantime she also lost her  was battling myelodysplastic syndrome.  She had a  "follow-up endoscopy for the gastric ulcer and that came back positive for adenocarcinoma consistent with lung primary.  She has been started on salvage chemotherapy with single agent Abraxane.  She had 1 cycle in November.  She is here for her next cycle.    She is managing her diabetes well.  Dr. Mayo started her on insulin.  Her blood sugars are getting better.    Tolerating abraxane well. Blood sugars ok. Comes in after CT scan. Significant anxiety due to scans. 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     Past Medical History     Past Medical History   Diagnosis Date     A-fib      Anemia      Cancer of lung      Carotid stenosis      Chronic kidney disease      CKD (chronic kidney disease)      Coronary artery disease      Diabetes mellitus 11/1/2016     Exacerbation of asthma 4/4/2007     HTN (hypertension)      Hyperlipidemia      Hypertension      Peripheral neuropathy      Pleural effusion      Primary lung adenocarcinoma      Stage IV NSCLC     Upper GI bleed      Review of Systems   Constitutional  Constitutional (WDL): All constitutional elements are within defined limitsDenies any complaints.  Neurosensory  Neurosensory (WDL): Exceptions to WDL  Peripheral Motor Neuropathy: Asymptomatic, clinical or diagnostic observations only, intervention not indicated (getting better/ generalized)  Peripheral Sensory Neuropathy: Moderate symptoms, limiting instrumental ADL (hands and feet using accupunture)  Cardiovascular  Cardiovascular (WDL): Exceptions to WDL  Edema: Yes  Edema Limbs: 5 - 10% inter-limb discrepancy in volume or circumference at point of greatest visible difference, swelling or obscuration of anatomic architecture on close inspection (left ankle)  Pulmonary  Cough: Mild symptoms, nonprescription intervention indicated  Gastrointestinal  Gastrointestinal (WDL): Exceptions to WDL  Anorexia: Loss of appetite without alteration in eating habits (\"forcing the " "issue\")  Genitourinary  Genitourinary (WDL): Exceptions to WDL  Integumentary  Integumentary (WDL): Exceptions to WDL  Alopecia: Hair loss of >50% normal for that individual that is readily apparent to others, a wig or hair piece is necessary if the patient desires to completely camouflage the hair loss, associated with psychosocial impact  Patient Coping  Patient Coping: Accepting  ECOG Performance   1  Pain Status  Currently in Pain: No/denies  Accompanied by  Accompanied by: Family Member      Vital Signs     Vitals:    01/12/17 0955   BP: 126/56   Pulse: 70   Temp: 97.4  F (36.3  C)   SpO2: 98%       Physical Exam     GENERAL: no acute distress. Cooperative in conversation.   HEENT: Starting to have alopecia. pupils are equal, round and reactive. Oral mucosa is clean and intact. No ulcerations or mucositis noted. No bleeding noted.  RESP:Chest symmetric lungs are clear bilaterally per auscultation. Regular respiratory rate. No wheezes or rhonchi.    CV: Normal S1 S2 Regular, rate and rhythm. No murmurs.  ABD: Nondistended, soft, slight epigastric tenderness. Positive bowel sounds. No organomegaly.   EXTREMITIES: No lower extremity edema.   NEURO: non focal. Alert and oriented x3.   PSYCH: within normal limits. No depression or anxiety.  SKIN: warm dry intact .      Lab Results     Results for orders placed or performed in visit on 01/12/17   Comprehensive Metabolic Panel   Result Value Ref Range    Sodium 144 136 - 145 mmol/L    Potassium 3.8 3.5 - 5.0 mmol/L    Chloride 109 (H) 98 - 107 mmol/L    CO2 29 22 - 31 mmol/L    Anion Gap, Calculation 6 5 - 18 mmol/L    Glucose 59 (LL) 70 - 125 mg/dL    BUN 21 8 - 28 mg/dL    Creatinine 1.19 (H) 0.60 - 1.10 mg/dL    GFR MDRD Af Amer 53 (L) >60 mL/min/1.73m2    GFR MDRD Non Af Amer 43 (L) >60 mL/min/1.73m2    Bilirubin, Total 0.4 0.0 - 1.0 mg/dL    Calcium 10.1 8.5 - 10.5 mg/dL    Protein, Total 6.3 6.0 - 8.0 g/dL    Albumin 3.5 3.5 - 5.0 g/dL    Alkaline Phosphatase 83 " 45 - 120 U/L    AST 21 0 - 40 U/L    ALT 19 0 - 45 U/L   HM1 (CBC with Diff)   Result Value Ref Range    WBC 10.0 4.0 - 11.0 thou/uL    RBC 3.48 (L) 3.80 - 5.40 mill/uL    Hemoglobin 10.4 (L) 12.0 - 16.0 g/dL    Hematocrit 31.2 (L) 35.0 - 47.0 %    MCV 90 80 - 100 fL    MCH 29.9 27.0 - 34.0 pg    MCHC 33.3 32.0 - 36.0 g/dL    RDW 19.4 (H) 11.0 - 14.5 %    Platelets 244 140 - 440 thou/uL    MPV 7.0 7.0 - 10.0 fL    Neutrophils % 78 (H) 50 - 70 %    Lymphocytes % 8 (L) 20 - 40 %    Monocytes % 12 (H) 2 - 10 %    Eosinophils % 2 0 - 6 %    Basophils % 0 0 - 2 %    Neutrophils Absolute 7.8 (H) 2.0 - 7.7 thou/uL    Lymphocytes Absolute 0.8 0.8 - 4.4 thou/uL    Monocytes Absolute 1.1 (H) 0.0 - 0.9 thou/uL    Eosinophils Absolute 0.2 0.0 - 0.4 thou/uL    Basophils Absolute 0.0 0.0 - 0.2 thou/uL     Her stomach biopsy has also shown     Distress assessment and ECOG status      ECOG Performance:    ECOG Performance Status: 1    Distress Assessment  Distress Assessment Score: Extreme distress (Scan results)       Imaging Results       Ct Chest Abdomen Pelvis With Oral Wo Iv    Result Date: 1/10/2017  CT CHEST, ABDOMEN, AND PELVIS 1/10/2017 1:28 PM      INDICATION: Lung Cancer TECHNIQUE: CT chest, abdomen, and pelvis. Dose reduction techniques were used. IV CONTRAST: None COMPARISON: 11/8/2016 study FINDINGS: CHEST: A confluent opacity in the medial right middle lobe with adjacent architectural distortion is 20 mm x 10 mm (previously 21 mm x 13 mm). A 5 mm x 3 mm nodule in the posterior right lower lobe has not changed (series 3 image 79). Scattered small 3 mm nodules throughout the lungs have not changed (images 32, 47, 69, 70, 74, and 85, for example). No new pulmonary nodules. The left heart is at the upper limits of normal in size. Low-attenuation within the cardiac ventricles is consistent with anemia.  There is atherosclerotic calcification including coronary artery calcification. The left and right pulmonary arteries  are mildly enlarged, which can be seen with pulmonary hypertension. Multiple enlarged mediastinal lymph nodes have decreased from the prior study. For example, an 11 mm para-aortic lymph node previously measured 18 mm. An 8 mm right lower paratracheal lymph node previously measured 13 mm. A 7 mm right upper paratracheal lymph node previously measured 14 mm. A few additional mediastinal  and hilar lymph nodes have not significantly changed in size. ABDOMEN: Normal spleen, pancreas, adrenal glands, and liver. The gallbladder is not distended. The right pancreas is atrophic, which is unchanged. Unchanged appearance of the left kidney. No hydronephrosis or hydroureter. Normal stomach and small bowel. Mild stranding noted in the mesentery and vipin hepatis. Multiple vipin hepatis, mesenteric, and retroperitoneal lymph nodes have significantly decreased in size from the prior study. For example, a 6 mm retroperitoneal lymph node previously measured 20 mm (series 4 image 2-36). There is advanced atherosclerotic calcification. PELVIS: Normal uterus. No ovarian masses. The colon is normal in appearance. Normal appendix. No pelvic lymphadenopathy. MUSCULOSKELETAL: Negative.     CONCLUSION: 1.  A right middle lobe opacity has minimally decreased in size. Multiple smaller pulmonary nodules have not changed in size. 2.  Significant decrease in size of multiple mediastinal, retroperitoneal, vipin hepatis, and mesenteric lymph nodes. A few mesenteric lymph nodes have not significantly changed in size. 3.  Atherosclerotic disease including coronary artery calcification. Suggestion of pulmonary hypertension. Atrophic right kidney.          Signed by: Jemal Evans MD

## 2021-06-08 NOTE — PROGRESS NOTES
Pt here for abraxane after seeing MD and receiving favorable scan results.  Treatment administered as ordered without incident and IV removed upon completion and pt d/c ambulatory to lobby with her daughter    Pt does have order for B12 injections, she will receive when here for chemo, she does not want to make a seperate trip

## 2021-06-08 NOTE — PROGRESS NOTES
"ACUPUNCTURIST TREATMENT NOTE    Name: Blanca Oreilly  :  1933  MRN:  [id number]    Acupuncture Treatment  Patient Type: WW CCC  Intervention Reason: Neuropathy, Neuropathy 2  Pre-session Neuropathy ratin  Post-session Neuropathy ratin  Pre-session Neuropathy 2 rating: 10  Post-session Neuropathy 2 rating: 10  Patient complaint:: neuropathy in hands and feet  Acupunture (Points):: St 36, GB 34, Sp 9, Ki 7, Maida 3, Sp 4, Ba Kranthi  Practitioner Observed: 30 minute treatment  Checklist: Progress Note Completed, Consent Reveiwed    \"Risks and benefits of acupuncture were discussed with patient. Consent for treatment was given. We thank you for the referral.\"     Mirian Dawson L.Ac.    Date:  2017  Time:  2:31 PM    "

## 2021-06-08 NOTE — PROGRESS NOTES
"ACUPUNCTURIST TREATMENT NOTE    Name: Blanca Oreilly  :  1933  MRN:  [id number]    Acupuncture Treatment  Patient Type: WW CCC  Intervention Reason: Neuropathy, Neuropathy 2  Pre-session Neuropathy ratin  Post-session Neuropathy ratin  Pre-session Neuropathy 2 ratin  Post-session Neuropathy 2 ratin  Patient complaint:: neuropathy in hands and feet  Acupunture (Points):: LI 4, SI 3, Ht 7, Ba Lurdes, St 36, Sp 9, Sp 6, Maida 3, Ba Kranthi  Practitioner Observed: 30 minute treatment  Checklist: Progress Note Completed, Consent Reveiwed    \"Risks and benefits of acupuncture were discussed with patient. Consent for treatment was given. We thank you for the referral.\"     Mirian Dawson L.Ac.    Date:  2017  Time:  3:17 PM    "

## 2021-06-08 NOTE — PROGRESS NOTES
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    PLAN: Toenails were debrided manually and mechanically x10. Return to clinic in nine weeks.         SUBJECTIVE: The patient presents to the [facility] as a new patient. Toenails are long, thick and painful. She has altered sensation in fingers and toes following chemotherapy treatments for cancer. She cannot cut the nails herself because of poor manual dexterity. Family members also have not been able to cut the toenails. She has not seen bleeding or drainage. She denies any foot trauma.    OBJECTIVE:  Visit Vitals     Ht 5' 1\" (1.549 m)     Wt 118 lb (53.5 kg)     BMI 22.3 kg/m2     General: Pleasant 83 y.o. female in no acute distress.  Vascular: DP pulses are palpable. PT pulses are palpable. Pedal hair is diminished. Feet are cool to the touch.  Neuro: Sensation in the feet is altered. Patient describes paresthesias.  Derm: Toenails are elongated, thickened and dystrophic with discoloration and subungual debris. Skin is thin and shiny but intact.   Musculoskeletal: Hammertoes.       Medical History  Past Medical History   Diagnosis Date     A-fib      Anemia      Cancer of lung      Carotid stenosis      Chronic kidney disease      CKD (chronic kidney disease)      Coronary artery disease      Diabetes mellitus 11/1/2016     Exacerbation of asthma 4/4/2007     HTN (hypertension)      Hyperlipidemia      Hypertension      Peripheral neuropathy      Pleural effusion      Primary lung adenocarcinoma      Upper GI bleed     Surgical History   has a past surgical history that includes excise breast cyst; Cataract extraction w/ intraocular lens  implant, bilateral; Tonsillectomy; Adenoidectomy; pluerex catheter placement; and Esophagogastroduodenoscopy (N/A, 9/6/2016).   Social History  Reviewed, and she  reports that she quit smoking about 36 years ago. She has a 3.00 pack-year smoking history. She has never used smokeless tobacco. " "She reports that she does not drink alcohol or use illicit drugs.   Allergies  Allergies   Allergen Reactions     Ciprofloxacin Other (See Comments)     \"Ankle pain\"     Iodinated Contrast Media - Oral And Iv Dye Hives and Other (See Comments)     CKD     Penicillins Swelling     Lip swelling.      Protonix [Pantoprazole] Other (See Comments)     Renal failure    Family History  family history includes Heart disease in her father and sister; Kidney disease in her mother and sister; No Medical Problems in her daughter, daughter, son, and son; Stroke in her sister.      Medications  Current Outpatient Prescriptions   Medication Sig Dispense Refill     amLODIPine (NORVASC) 5 MG tablet Take 1 tablet (5 mg total) by mouth daily. 90 tablet 3     atorvastatin (LIPITOR) 80 MG tablet One half daily 90 tablet 3     LANTUS SOLOSTAR 100 unit/mL (3 mL) pen Inject 10 Units under the skin bedtime. 11.65 Type 2 with hyperglycemia 3 mL PRN     magnesium 250 mg Tab Take 500 mg by mouth daily.        metoprolol succinate (TOPROL-XL) 50 MG 24 hr tablet Take 1 tablet (50 mg total) by mouth bedtime. 90 tablet 3     NOVOLOG FLEXPEN 100 unit/mL injection pen Check blood sugar four (4) times daily.  11.65 Type 2 with hyperglycemia  Flex Pen Needles - BD Ultra-fine Mary Pen Needles - NDC [address]-72 - dispense 1 case, refill PRN for 1 year  No supplies needed 3 mL PRN     ranitidine (ZANTAC) 150 MG tablet TAKE 1 TABLET BY MOUTH TWICE DAILY 180 tablet 3     sucralfate (CARAFATE) 100 mg/mL suspension Take 10 mL (1 g total) by mouth 4 (four) times a day with meals and bedtime. 420 mL 1     No current facility-administered medications for this visit.          Review of Systems:  A 12 point comprehensive review of systems was negative except as noted.          "

## 2021-06-08 NOTE — PROGRESS NOTES
"ACUPUNCTURIST TREATMENT NOTE    Name: Blanca Oreilly  :  1933  MRN:  [id number]    Acupuncture Treatment  Patient Type: WW CCC  Intervention Reason: Neuropathy, Neuropathy 2  Pre-session Neuropathy ratin  Post-session Neuropathy ratin  Pre-session Neuropathy 2 ratin  Post-session Neuropathy 2 ratin  Patient complaint:: neuropathy in hands and feet  Acupunture (Points):: SI 3, SI 4, TW 4, LI 4, Ba Lurdes, St 36, Peroneous longus, brevis, and tertious MP, Lio Crisostomo, Ki 1, Ki 3, Sp 4  Practitioner Observed: 30 minute treatment  Checklist: Progress Note Completed, Consent Reveiwed    \"Risks and benefits of acupuncture were discussed with patient. Consent for treatment was given. We thank you for the referral.\"     Mirian Dawson L.Ac.    Date:  2017  Time:  3:23 PM    "

## 2021-06-08 NOTE — PROGRESS NOTES
[facility] Hematology and Oncology Progress Note    Patient: Blanca Oreilly  MRN: [id number]  Date of Service: 2/2/2017            Reason for visit      1. Malignant neoplasm of upper lobe bronchus, right    NSCLC; EGFR and ALK-1 negative. PD-L1 strongly positive for both 22C3, 28-8    Assessment     1. Very pleasant 83 y.o. woman with stage IV NSCLC, adenocarcinoma. Negative for EGFR  And ALK-1. We also checked PD-L1 and she is strongly positive.  2. Initially had 4 cycles of carboplatin, taxol and avastin. Then was on Alimta and Avastin.  Her CT scan showed slight increased precrinal and subcrinal nodes.  PET scan also confirmed progression. She has had 4 cycles Carboplatin and Taxol. PET scan showed good response.  Started on Pembrolizumab but after 4 cycles there was some progression.  She then tried and progressed on Opdivo.  Currently she is on Abraxane since November 2016. CT scan after 3 cycles is showing good response.  3. Gastric ulcer pathology is consistent with non-small cell lung cancer .  4. Renal failure for which she was on prednisone and it is getting stable.  5. 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  6. Anxiety and depression.  7. Lack of proper sleep due to her 's passing away as well as her diabetes.  8. Positive stress test for which now she is on nitrates.    Plan     1. Continue Abraxane.  She will come back in 3 weeks. Plan to do PET scan after 6th cycle.  2. RTC in 3 weeks.  3. Continue insulin as directed to manage her blood sugars.  4. Continue with her her heart medications as well.  She will need an EGD and also to check on the gastric ulcer.    Clinical stage      Lung cancer, Right side    Primary site: Lung (Right)    Staging method: AJCC 7th Edition    Clinical: Stage IV (T4, N0, M1a) signed by Jemal Evans MD on 10/20/2014  2:19 PM    Summary: Stage IV (T4, N0, M1a)      History     Blanca Oreilly is a very pleasant 83 y.o. old female with a history of   non-small cell lung cancer located in the right lung measuring 5.7 cm in size presenting with some shortness of breath associated with malignant pleural effusion in the month of October 2014.  Pleural tap was positive for malignant cells adenocarcinoma in nature.  CK 7 positive CK 20 negative TTF-1 positive.  Subsequently that she was admitted due to worsening shortness of breath and her pleural effusion was tapped . She had Pleurx catheter placed but then removed as pleural fluid has reduced to few cc per week.   Her PET scan done at [facility] showed disease limited to thorax only. She has started on chemotherapy with carboplatin and Taxol with avastin. Has had 4 doses. Her PET scan showed nearly complete response. So we started her on Alimta and Avastin. She started that in February 2015.    She had CT in September 2015 which showed some worsening of subcrinal LN. Then had pet scan. That shows progression. Started back on Carboplatin and Taxol. We did give her Avastin but then we had to hold it due to protienuria. CT scan after 2 cycles showed improvement. PET scan after 4 showed partial response. She was tested for PD-L1 and was positive for both opdivo and Pembrolizumab. So we started on Pembrolizumab in late January 2016.  However PET scan after 3 cycles showed progression. So in April we started on Opdivo.    Was in hospital in the beginning of September 2016 with GI bleed and was found to have gastric ulcer.  She was started on PPI. Coumadin stopped.  In October 2016 her PET scan  showed progression.  Opdivo has been stopped.  Her PET scan also showed some fullness on her kidney.  Her creatinine had gone up.  She was then admitted and started on some IV fluids and seen by nephrology.  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  High doses of steroids actually give her diabetes.  In the meantime she also lost her   was battling myelodysplastic syndrome.  She had a follow-up endoscopy for the gastric ulcer and that came back positive for adenocarcinoma consistent with lung primary.  She has been started on salvage chemotherapy with single agent Abraxane.  She had 1 cycle in November.  She is here for her next cycle.    She is managing her diabetes well.  Dr. Mayo started her on insulin.  Her blood sugars are getting better.    Tolerating abraxane well. Blood sugars ok.  The scan after 3 cycles showed significant improvement in the mediastinal as well as abdominal lymphadenopathy.  She is not having any more GI bleeding.  Her hemoglobin is going up.  She did have an abnormal stress test for which she ended up being in the hospital.  A reversible ischemic defect was noted.  She has been put on nitrates.     Past Medical History     Past Medical History:   Diagnosis Date     A-fib      Anemia      Cancer of lung      Carotid stenosis      Chronic kidney disease      CKD (chronic kidney disease)      Coronary artery disease      Diabetes mellitus 11/1/2016     Diabetes mellitus, type II      Exacerbation of asthma 4/4/2007     High cholesterol      HTN (hypertension)      Hyperlipidemia      Hypertension      Peripheral neuropathy      Pleural effusion      Primary lung adenocarcinoma     Stage IV NSCLC     Upper GI bleed      Review of Systems   Constitutional  Constitutional (WDL): All constitutional elements are within defined limitsDenies any complaints.  Neurosensory  Neurosensory (WDL): Exceptions to WDL  Peripheral Motor Neuropathy: Asymptomatic, clinical or diagnostic observations only, intervention not indicated (generalized)  Peripheral Sensory Neuropathy: Moderate symptoms, limiting instrumental ADL (hands and feet/using accupunture)  Cardiovascular  Cardiovascular (WDL): All cardiovascular elements are within defined limits  Pulmonary  Dyspnea: Shortness of breath with moderate  exertion  Gastrointestinal  Gastrointestinal (WDL): Exceptions to WDL  Anorexia: Loss of appetite without alteration in eating habits  Dry Mouth: Symptomatic (e.g., dry or thick saliva) without significant dietary alteration, unstimulated saliva flow >0.2 ml/min  Genitourinary  Genitourinary (WDL): All genitourinary elements are within defined limits (urgency)  Integumentary  Integumentary (WDL): Exceptions to WDL  Alopecia: Hair loss of >50% normal for that individual that is readily apparent to others, a wig or hair piece is necessary if the patient desires to completely camouflage the hair loss, associated with psychosocial impact  Patient Coping  Patient Coping: Accepting  ECOG Performance   1  Pain Status  Currently in Pain: No/denies  Accompanied by  Accompanied by: Family Member      Vital Signs     Vitals:    02/02/17 1106   BP: 115/53   Pulse: 64   Temp: 98  F (36.7  C)   SpO2: 98%       Physical Exam     GENERAL: no acute distress. Cooperative in conversation.   HEENT: Starting to have alopecia. pupils are equal, round and reactive. Oral mucosa is clean and intact. No ulcerations or mucositis noted. No bleeding noted.  RESP:Chest symmetric lungs are clear bilaterally per auscultation. Regular respiratory rate. No wheezes or rhonchi.    CV: Normal S1 S2 Regular, rate and rhythm. No murmurs.  ABD: Nondistended, soft, slight epigastric tenderness. Positive bowel sounds. No organomegaly.   EXTREMITIES: No lower extremity edema.  Noticing slight increased edema around the lower part of her ankles.  NEURO: non focal. Alert and oriented x3.   PSYCH: within normal limits. No depression or anxiety.  SKIN: warm dry intact .      Lab Results     Results for orders placed or performed in visit on 02/02/17   Comprehensive Metabolic Panel   Result Value Ref Range    Sodium 141 136 - 145 mmol/L    Potassium 3.6 3.5 - 5.0 mmol/L    Chloride 108 (H) 98 - 107 mmol/L    CO2 27 22 - 31 mmol/L    Anion Gap, Calculation 6 5 -  18 mmol/L    Glucose 172 (H) 70 - 125 mg/dL    BUN 27 8 - 28 mg/dL    Creatinine 1.25 (H) 0.60 - 1.10 mg/dL    GFR MDRD Af Amer 50 (L) >60 mL/min/1.73m2    GFR MDRD Non Af Amer 41 (L) >60 mL/min/1.73m2    Bilirubin, Total 0.3 0.0 - 1.0 mg/dL    Calcium 9.8 8.5 - 10.5 mg/dL    Protein, Total 6.0 6.0 - 8.0 g/dL    Albumin 3.3 (L) 3.5 - 5.0 g/dL    Alkaline Phosphatase 84 45 - 120 U/L    AST 28 0 - 40 U/L    ALT 26 0 - 45 U/L   HM1 (CBC with Diff)   Result Value Ref Range    WBC 10.9 4.0 - 11.0 thou/uL    RBC 3.41 (L) 3.80 - 5.40 mill/uL    Hemoglobin 10.0 (L) 12.0 - 16.0 g/dL    Hematocrit 30.4 (L) 35.0 - 47.0 %    MCV 89 80 - 100 fL    MCH 29.4 27.0 - 34.0 pg    MCHC 33.0 32.0 - 36.0 g/dL    RDW 17.2 (H) 11.0 - 14.5 %    Platelets 253 140 - 440 thou/uL    MPV 7.4 7.0 - 10.0 fL    Neutrophils % 80 (H) 50 - 70 %    Lymphocytes % 8 (L) 20 - 40 %    Monocytes % 10 2 - 10 %    Eosinophils % 2 0 - 6 %    Basophils % 0 0 - 2 %    Neutrophils Absolute 8.7 (H) 2.0 - 7.7 thou/uL    Lymphocytes Absolute 0.9 0.8 - 4.4 thou/uL    Monocytes Absolute 1.0 (H) 0.0 - 0.9 thou/uL    Eosinophils Absolute 0.2 0.0 - 0.4 thou/uL    Basophils Absolute 0.0 0.0 - 0.2 thou/uL     Her stomach biopsy has also shown     Distress assessment and ECOG status      ECOG Performance:    ECOG Performance Status: 1    Distress Assessment  Distress Assessment Score: 2       Imaging Results       Xr Chest Pa And Lateral    Result Date: 1/18/2017  XR CHEST PA AND LATERAL 1/18/2017 10:55 AM INDICATION: chest pain COMPARISON: 5/26/2016 FINDINGS: Stable normal heart size. Lung fields are clear. No infiltrate, effusion or pneumothorax. Aortic calcification noted. Flattening of diaphragms suggesting air trapping. Stable fullness about the right hilum.    Ct Chest Abdomen Pelvis With Oral Wo Iv    Result Date: 1/10/2017  CT CHEST, ABDOMEN, AND PELVIS 1/10/2017 1:28 PM      INDICATION: Lung Cancer TECHNIQUE: CT chest, abdomen, and pelvis. Dose reduction techniques  were used. IV CONTRAST: None COMPARISON: 11/8/2016 study FINDINGS: CHEST: A confluent opacity in the medial right middle lobe with adjacent architectural distortion is 20 mm x 10 mm (previously 21 mm x 13 mm). A 5 mm x 3 mm nodule in the posterior right lower lobe has not changed (series 3 image 79). Scattered small 3 mm nodules throughout the lungs have not changed (images 32, 47, 69, 70, 74, and 85, for example). No new pulmonary nodules. The left heart is at the upper limits of normal in size. Low-attenuation within the cardiac ventricles is consistent with anemia.  There is atherosclerotic calcification including coronary artery calcification. The left and right pulmonary arteries are mildly enlarged, which can be seen with pulmonary hypertension. Multiple enlarged mediastinal lymph nodes have decreased from the prior study. For example, an 11 mm para-aortic lymph node previously measured 18 mm. An 8 mm right lower paratracheal lymph node previously measured 13 mm. A 7 mm right upper paratracheal lymph node previously measured 14 mm. A few additional mediastinal  and hilar lymph nodes have not significantly changed in size. ABDOMEN: Normal spleen, pancreas, adrenal glands, and liver. The gallbladder is not distended. The right pancreas is atrophic, which is unchanged. Unchanged appearance of the left kidney. No hydronephrosis or hydroureter. Normal stomach and small bowel. Mild stranding noted in the mesentery and vipin hepatis. Multiple vipin hepatis, mesenteric, and retroperitoneal lymph nodes have significantly decreased in size from the prior study. For example, a 6 mm retroperitoneal lymph node previously measured 20 mm (series 4 image [id number]). There is advanced atherosclerotic calcification. PELVIS: Normal uterus. No ovarian masses. The colon is normal in appearance. Normal appendix. No pelvic lymphadenopathy. MUSCULOSKELETAL: Negative.     CONCLUSION: 1.  A right middle lobe opacity has minimally decreased  in size. Multiple smaller pulmonary nodules have not changed in size. 2.  Significant decrease in size of multiple mediastinal, retroperitoneal, vipin hepatis, and mesenteric lymph nodes. A few mesenteric lymph nodes have not significantly changed in size. 3.  Atherosclerotic disease including coronary artery calcification. Suggestion of pulmonary hypertension. Atrophic right kidney.    Nm Pharmacologic Stress Test    Result Date: 1/18/2017    The pharmacologic nuclear stress test is abnormal.   There is a small area of ischemia in the inferolateral segment(s) of the left ventricle.   After the lexiscan infusion the patient developed a very fast SVT with diffuse ST depressions and reproduction of her symptoms.   The patient is at a low risk of future cardiac ischemic events.   When compared to the images of the study of 12/16/2011, the inferior lateral ischemia is new.            Signed by: Jemal Evans MD

## 2021-06-09 NOTE — PROGRESS NOTES
PT here for txt. PT has peripheral neuropathy that has affected her walking but wants to do this treatment and then will stop txt. Labs drawn approved for txt. Txt and duration reviewed with pt. Iv placed with brisk blood return/smooth ns flush. Txt adminsitered as ordered and tubing flushed with ns upon completion of txt. Iv dc'd and pressure dressing to site. Follow up reviewed and pt dc'd steady gait with daughter.

## 2021-06-09 NOTE — PROGRESS NOTES
Pt here for abraxane after seeing MD and getting good scan result news.  IV started without incident and treatment administered as ordered and IV removed upon completion.  Pt d/c ambulatory to lobby with her daughter.

## 2021-06-09 NOTE — PROGRESS NOTES
"ACUPUNCTURIST TREATMENT NOTE    Name: Blanca Oreilly  :  1933  MRN:  [id number]    Acupuncture Treatment  Patient Type: WW CCC  Intervention Reason: Pain, Neuropathy, Neuropathy 2  Pre-session Pain ratin  Post-session Pain ratin  Pre-session Neuropathy ratin  Post-session Neuropathy ratin  Pre-session Neuropathy 2 ratin  Post-session Neuropathy 2 ratin  Patient complaint:: upper abdominal pain d/t ulcer; pain at (L) tricep, neuropathy in hands and feet  Acupunture (Points):: Pc 6, LI 4, St 36, Sp 9, Maida 3, Sp 4, St 44  Lyla: upper arm x3 bilateral  Practitioner Observed: 30 minute treatment  Checklist: Progress Note Completed, Consent Reveiwed    \"Risks and benefits of acupuncture were discussed with patient. Consent for treatment was given. We thank you for the referral.\"     Mirian Dawson L.Ac.    Date:  2017  Time:  2:51 PM    "

## 2021-06-09 NOTE — PROGRESS NOTES
Pt arrived ambulatory to clinic for Cycle # 6 of her chemotherapy regimen.  IV started without difficulties with excellent blood return.  Administered premedications and chemotherapy per MD order.  Pt tolerated infusion well, no s/s of infusion reaction. B-12 SQ injection administered into Right Upper Arm.  Pt tolerated procedure well, no s/s of bleeding or swelling at site.  IV site flushed and D/C'd using 2x2 and Coban.  Pt verbalized understanding of plan of care and return to clinic.

## 2021-06-09 NOTE — PROGRESS NOTES
[facility] Hematology and Oncology Progress Note    Patient: Blanca Oreilly  MRN: [id number]  Date of Service:            Reason for visit      1. Malignant neoplasm of upper lobe bronchus, right    NSCLC; EGFR and ALK-1 negative. PD-L1 strongly positive for both 22C3, 28-8    Assessment     1. Very pleasant 83 y.o. woman with stage IV NSCLC, adenocarcinoma. Negative for EGFR  And ALK-1. We also checked PD-L1 and she is strongly positive.  2. Initially had 4 cycles of carboplatin, taxol and avastin. Then was on Alimta and Avastin.  Her CT scan showed slight increased precrinal and subcrinal nodes.  PET scan also confirmed progression. She has had 4 cycles Carboplatin and Taxol. PET scan showed good response.  Started on Pembrolizumab but after 4 cycles there was some progression.  She then tried and progressed on Opdivo.  Currently she is on Abraxane since November 2016. PET scan is showing complete response.  3. Gastric ulcer pathology is consistent with non-small cell lung cancer.  Clinically she seems to be doing well on that her hemoglobin is getting better and she has not had any more blood in her stools.  4. Renal failure for which she was on prednisone and it is getting stable.  5. 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  6. Anxiety and depression.  7. Lack of proper sleep due to her 's passing away as well as her diabetes.  8. Positive stress test for which now she is on nitrates.    Plan     1. Continue Abraxane.  We will need to monitor carefully for side effects.  2. RTC in 3 weeks.  3. Continue insulin as directed to manage her blood sugars.  4. Continue with her her heart medications as well.  She will eventually need an EGD and also to check on the gastric ulcer.    Clinical stage      Lung cancer, Right side    Primary site: Lung (Right)    Staging method: AJCC 7th Edition    Clinical: Stage IV (T4, N0, M1a) signed by Jemal Evans MD on 10/20/2014  2:19 PM    Summary: Stage IV  (T4, N0, M1a)      History     Blanca Oreilly is a very pleasant 83 y.o. old female with a history of  non-small cell lung cancer located in the right lung measuring 5.7 cm in size presenting with some shortness of breath associated with malignant pleural effusion in the month of October 2014.  Pleural tap was positive for malignant cells adenocarcinoma in nature.  CK 7 positive CK 20 negative TTF-1 positive.  Subsequently that she was admitted due to worsening shortness of breath and her pleural effusion was tapped . She had Pleurx catheter placed but then removed as pleural fluid has reduced to few cc per week.   Her PET scan done at [facility] showed disease limited to thorax only. She has started on chemotherapy with carboplatin and Taxol with avastin. Has had 4 doses. Her PET scan showed nearly complete response. So we started her on Alimta and Avastin. She started that in February 2015.    She had CT in September 2015 which showed some worsening of subcrinal LN. Then had pet scan. That shows progression. Started back on Carboplatin and Taxol. We did give her Avastin but then we had to hold it due to protienuria. CT scan after 2 cycles showed improvement. PET scan after 4 showed partial response. She was tested for PD-L1 and was positive for both opdivo and Pembrolizumab. So we started on Pembrolizumab in late January 2016.  However PET scan after 3 cycles showed progression. So in April we started on Opdivo.    Was in hospital in the beginning of September 2016 with GI bleed and was found to have gastric ulcer.  She was started on PPI. Coumadin stopped.  In October 2016 her PET scan  showed progression.  Opdivo has been stopped.  Her PET scan also showed some fullness on her kidney.  Her creatinine had gone up.  She was then admitted and started on some IV fluids and seen by nephrology.  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      She has somewhat recovered from  her nephritis.  High doses of steroids actually give her diabetes.  In the meantime she also lost her  was battling myelodysplastic syndrome.  She had a follow-up endoscopy for the gastric ulcer and that came back positive for adenocarcinoma consistent with lung primary.  She has been started on salvage chemotherapy with single agent Abraxane.  She had 1 cycle in November.  She is here for her next cycle.    She is managing her diabetes well.  Dr. Mayo started her on insulin.  Her blood sugars are getting better.    Tolerating abraxane well. Blood sugars ok.  The scan after 3 cycles showed significant improvement in the mediastinal as well as abdominal lymphadenopathy.  She is not having any more GI bleeding.  Her hemoglobin is going up.  She did have an abnormal stress test for which she ended up being in the hospital.  A reversible ischemic defect was noted.  She has been put on nitrates.  She is otherwise doing well.  She has not had any EGD since starting her chemotherapy.     Comes in today for scheduled f/u after PET scan. She is feeling good. Occasional chest pain.     Past Medical History     Past Medical History:   Diagnosis Date     A-fib      Anemia      Cancer of lung      Carotid stenosis      Chronic kidney disease      CKD (chronic kidney disease)      Coronary artery disease      Diabetes mellitus 11/1/2016     Diabetes mellitus, type II      Exacerbation of asthma 4/4/2007     High cholesterol      HTN (hypertension)      Hyperlipidemia      Hypertension      Peripheral neuropathy      Pleural effusion      Primary lung adenocarcinoma     Stage IV NSCLC     Upper GI bleed      Review of Systems   Constitutional  Constitutional (WDL): All constitutional elements are within defined limitsDenies any complaints.  Neurosensory  Neurosensory (WDL): Exceptions to WDL  Peripheral Motor Neuropathy: Asymptomatic, clinical or diagnostic observations only, intervention not indicated  Ataxia:  Asymptomatic, clinical or diagnostic observations only, intervention not indicated  Peripheral Sensory Neuropathy: Moderate symptoms, limiting instrumental ADL (hands and feet/ accupuncture starting soon)  Cardiovascular  Cardiovascular (WDL): All cardiovascular elements are within defined limits  Pulmonary     Gastrointestinal  Gastrointestinal (WDL): Exceptions to WDL  Anorexia: Loss of appetite without alteration in eating habits (never hungry)  Diarrhea: Increase of <4 stools per day over baseline, mild increase in ostomy output compared to baseline  Genitourinary  Genitourinary (WDL): Exceptions to WDL (urgency)  Integumentary  Integumentary (WDL): Exceptions to WDL  Alopecia: Hair loss of >50% normal for that individual that is readily apparent to others, a wig or hair piece is necessary if the patient desires to completely camouflage the hair loss, associated with psychosocial impact  Patient Coping  Patient Coping: Accepting  ECOG Performance   1  Pain Status  Currently in Pain: Yes  Accompanied by  Accompanied by: Family Member      Vital Signs     Vitals:    03/15/17 0917   BP: 143/64   Pulse: (!) 59   Temp: 98  F (36.7  C)   SpO2: 99%       Physical Exam     GENERAL: no acute distress. Cooperative in conversation.   HEENT: Starting to have alopecia. pupils are equal, round and reactive. Oral mucosa is clean and intact. No ulcerations or mucositis noted. No bleeding noted.  RESP:Chest symmetric lungs are clear bilaterally per auscultation. Regular respiratory rate. No wheezes or rhonchi.    CV: Normal S1 S2 Regular, rate and rhythm. No murmurs.  ABD: Nondistended, soft, slight epigastric tenderness. Positive bowel sounds. No organomegaly.   EXTREMITIES: No lower extremity edema.  Noticing slight increased edema around the lower part of her ankles.  NEURO: non focal. Alert and oriented x3.   PSYCH: within normal limits. No depression or anxiety.  SKIN: warm dry intact .      Lab Results     Results for  orders placed or performed in visit on 03/15/17   Comprehensive Metabolic Panel   Result Value Ref Range    Sodium 141 136 - 145 mmol/L    Potassium 3.9 3.5 - 5.0 mmol/L    Chloride 105 98 - 107 mmol/L    CO2 31 22 - 31 mmol/L    Anion Gap, Calculation 5 5 - 18 mmol/L    Glucose 187 (H) 70 - 125 mg/dL    BUN 22 8 - 28 mg/dL    Creatinine 1.33 (H) 0.60 - 1.10 mg/dL    GFR MDRD Af Amer 46 (L) >60 mL/min/1.73m2    GFR MDRD Non Af Amer 38 (L) >60 mL/min/1.73m2    Bilirubin, Total 0.4 0.0 - 1.0 mg/dL    Calcium 10.1 8.5 - 10.5 mg/dL    Protein, Total 6.6 6.0 - 8.0 g/dL    Albumin 3.7 3.5 - 5.0 g/dL    Alkaline Phosphatase 78 45 - 120 U/L    AST 23 0 - 40 U/L    ALT 25 0 - 45 U/L   HM1 (CBC with Diff)   Result Value Ref Range    WBC 9.3 4.0 - 11.0 thou/uL    RBC 3.81 3.80 - 5.40 mill/uL    Hemoglobin 11.1 (L) 12.0 - 16.0 g/dL    Hematocrit 33.5 (L) 35.0 - 47.0 %    MCV 88 80 - 100 fL    MCH 29.2 27.0 - 34.0 pg    MCHC 33.2 32.0 - 36.0 g/dL    RDW 17.5 (H) 11.0 - 14.5 %    Platelets 249 140 - 440 thou/uL    MPV 7.4 7.0 - 10.0 fL    Neutrophils % 75 (H) 50 - 70 %    Lymphocytes % 11 (L) 20 - 40 %    Monocytes % 11 (H) 2 - 10 %    Eosinophils % 4 0 - 6 %    Basophils % 0 0 - 2 %    Neutrophils Absolute 6.9 2.0 - 7.7 thou/uL    Lymphocytes Absolute 1.0 0.8 - 4.4 thou/uL    Monocytes Absolute 1.0 (H) 0.0 - 0.9 thou/uL    Eosinophils Absolute 0.3 0.0 - 0.4 thou/uL    Basophils Absolute 0.0 0.0 - 0.2 thou/uL         Distress assessment and ECOG status      ECOG Performance:    ECOG Performance Status: 1    Distress Assessment  Distress Assessment Score: 2       Imaging Results       Nm Pet Ct Skull To Mid Thigh    Result Date: 3/13/2017  PET FDG/CT 3/13/2017 1:11 PM INDICATION: Lung cancer restaging, subsequent treatment strategy TECHNIQUE: Serum glucose level 98 mg/dL. One hour post left antecubital intravenous administration of 8.8 mCi F-18 FDG, PET imaging was performed from the skull base to the mid thighs utilizing  attenuation correction with concurrent axial CT and PET/CT image fusion. Dose reduction techniques were used. COMPARISON: PET CT from 10/11/2016 FINDINGS: HEAD AND NECK: Mild generalized cerebral and cerebellar volume loss, likely age-related. Bilateral supraclavicular lymphadenopathy has resolved. CHEST: No abnormal FDG activity. Lymphadenopathy has completely resolved. Calcified atherosclerosis, including coronary. ABDOMEN/PELVIS: No abnormal FDG activity. Lymphadenopathy has completely resolved. Left kidney has returned to normal. Calcified splenic granuloma. Atrophic right kidney. Marked calcified atherosclerosis. Pelvic phleboliths. MUSCULOSKELETAL: Left posterior acetabular metastasis has become sclerotic and non-FDG avid. Mild degenerative change in the spine.     CONCLUSION: 1.  Complete response 2.  Left nephritis has resolved.          Signed by: Jemal Evans MD

## 2021-06-09 NOTE — PROGRESS NOTES
[facility] Hematology and Oncology Progress Note    Patient: Blanca Oreilly  MRN: [id number]  Date of Service: 02/23/2017           Reason for visit      1. Malignant neoplasm of upper lobe bronchus, right    2. Anemia due to antineoplastic chemotherapy    NSCLC; EGFR and ALK-1 negative. PD-L1 strongly positive for both 22C3, 28-8    Assessment     1. Very pleasant 83 y.o. woman with stage IV NSCLC, adenocarcinoma. Negative for EGFR  And ALK-1. We also checked PD-L1 and she is strongly positive.  2. Initially had 4 cycles of carboplatin, taxol and avastin. Then was on Alimta and Avastin.  Her CT scan showed slight increased precrinal and subcrinal nodes.  PET scan also confirmed progression. She has had 4 cycles Carboplatin and Taxol. PET scan showed good response.  Started on Pembrolizumab but after 4 cycles there was some progression.  She then tried and progressed on Opdivo.  Currently she is on Abraxane since November 2016. CT scan after 3 cycles is showing good response.  3. Gastric ulcer pathology is consistent with non-small cell lung cancer.  Clinically she seems to be doing well on that her hemoglobin is getting better and she has not had any more blood in her stools.  4. Renal failure for which she was on prednisone and it is getting stable.  5. 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  6. Anxiety and depression.  7. Lack of proper sleep due to her 's passing away as well as her diabetes.  8. Positive stress test for which now she is on nitrates.    Plan     1. Continue Abraxane.  She will come back in 3 weeks with a PET scan.  2. RTC in 3 weeks.  3. Continue insulin as directed to manage her blood sugars.  4. Continue with her her heart medications as well.  She will need an EGD and also to check on the gastric ulcer.    Clinical stage      Lung cancer, Right side    Primary site: Lung (Right)    Staging method: AJCC 7th Edition    Clinical: Stage IV (T4, N0, M1a) signed by Jemal STEWART  MD Cristina on 10/20/2014  2:19 PM    Summary: Stage IV (T4, N0, M1a)      History     Blanca Oreilly is a very pleasant 83 y.o. old female with a history of  non-small cell lung cancer located in the right lung measuring 5.7 cm in size presenting with some shortness of breath associated with malignant pleural effusion in the month of October 2014.  Pleural tap was positive for malignant cells adenocarcinoma in nature.  CK 7 positive CK 20 negative TTF-1 positive.  Subsequently that she was admitted due to worsening shortness of breath and her pleural effusion was tapped . She had Pleurx catheter placed but then removed as pleural fluid has reduced to few cc per week.   Her PET scan done at [facility] showed disease limited to thorax only. She has started on chemotherapy with carboplatin and Taxol with avastin. Has had 4 doses. Her PET scan showed nearly complete response. So we started her on Alimta and Avastin. She started that in February 2015.    She had CT in September 2015 which showed some worsening of subcrinal LN. Then had pet scan. That shows progression. Started back on Carboplatin and Taxol. We did give her Avastin but then we had to hold it due to protienuria. CT scan after 2 cycles showed improvement. PET scan after 4 showed partial response. She was tested for PD-L1 and was positive for both opdivo and Pembrolizumab. So we started on Pembrolizumab in late January 2016.  However PET scan after 3 cycles showed progression. So in April we started on Opdivo.    Was in hospital in the beginning of September 2016 with GI bleed and was found to have gastric ulcer.  She was started on PPI. Coumadin stopped.  In October 2016 her PET scan  showed progression.  Opdivo has been stopped.  Her PET scan also showed some fullness on her kidney.  Her creatinine had gone up.  She was then admitted and started on some IV fluids and seen by nephrology.  She has been started on some prednisone for concern that this might be  autoimmune nephritis.      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  High doses of steroids actually give her diabetes.  In the meantime she also lost her  was battling myelodysplastic syndrome.  She had a follow-up endoscopy for the gastric ulcer and that came back positive for adenocarcinoma consistent with lung primary.  She has been started on salvage chemotherapy with single agent Abraxane.  She had 1 cycle in November.  She is here for her next cycle.    She is managing her diabetes well.  Dr. Mayo started her on insulin.  Her blood sugars are getting better.    Tolerating abraxane well. Blood sugars ok.  The scan after 3 cycles showed significant improvement in the mediastinal as well as abdominal lymphadenopathy.  She is not having any more GI bleeding.  Her hemoglobin is going up.  She did have an abnormal stress test for which she ended up being in the hospital.  A reversible ischemic defect was noted.  She has been put on nitrates.  She is otherwise doing well.  She has not had any EGD since starting her chemotherapy.     Past Medical History     Past Medical History:   Diagnosis Date     A-fib      Anemia      Cancer of lung      Carotid stenosis      Chronic kidney disease      CKD (chronic kidney disease)      Coronary artery disease      Diabetes mellitus 11/1/2016     Diabetes mellitus, type II      Exacerbation of asthma 4/4/2007     High cholesterol      HTN (hypertension)      Hyperlipidemia      Hypertension      Peripheral neuropathy      Pleural effusion      Primary lung adenocarcinoma     Stage IV NSCLC     Upper GI bleed      Review of Systems   Constitutional  Constitutional (WDL): All constitutional elements are within defined limitsDenies any complaints.  Neurosensory  Neurosensory (WDL): Exceptions to WDL  Peripheral Sensory Neuropathy: Moderate symptoms, limiting instrumental ADL (hands and feet doing accupunture)  Cardiovascular  Cardiovascular (WDL): Exceptions to  "WDL  Edema: Yes  Edema Limbs: 5 - 10% inter-limb discrepancy in volume or circumference at point of greatest visible difference, swelling or obscuration of anatomic architecture on close inspection (right ankle)  Pulmonary  Dyspnea: Shortness of breath with moderate exertion  Gastrointestinal  Gastrointestinal (WDL): Exceptions to WDL  Anorexia: Loss of appetite without alteration in eating habits (\"I never get hungry\")  Dry Mouth: Symptomatic (e.g., dry or thick saliva) without significant dietary alteration, unstimulated saliva flow >0.2 ml/min  Genitourinary  Genitourinary (WDL): Exceptions to WDL (urgency)  Integumentary  Integumentary (WDL): Exceptions to WDL  Alopecia: Hair loss of >50% normal for that individual that is readily apparent to others, a wig or hair piece is necessary if the patient desires to completely camouflage the hair loss, associated with psychosocial impact  Patient Coping  Patient Coping: Accepting  ECOG Performance   1  Pain Status  Currently in Pain: Yes  Accompanied by  Accompanied by: Family Member      Vital Signs     Vitals:    02/23/17 1032   BP: 138/63   Pulse: 66   Temp: 97.8  F (36.6  C)   SpO2: 97%       Physical Exam     GENERAL: no acute distress. Cooperative in conversation.   HEENT: Starting to have alopecia. pupils are equal, round and reactive. Oral mucosa is clean and intact. No ulcerations or mucositis noted. No bleeding noted.  RESP:Chest symmetric lungs are clear bilaterally per auscultation. Regular respiratory rate. No wheezes or rhonchi.    CV: Normal S1 S2 Regular, rate and rhythm. No murmurs.  ABD: Nondistended, soft, slight epigastric tenderness. Positive bowel sounds. No organomegaly.   EXTREMITIES: No lower extremity edema.  Noticing slight increased edema around the lower part of her ankles.  NEURO: non focal. Alert and oriented x3.   PSYCH: within normal limits. No depression or anxiety.  SKIN: warm dry intact .      Lab Results     Results for orders placed " or performed in visit on 02/23/17   Comprehensive Metabolic Panel   Result Value Ref Range    Sodium 140 136 - 145 mmol/L    Potassium 3.8 3.5 - 5.0 mmol/L    Chloride 107 98 - 107 mmol/L    CO2 26 22 - 31 mmol/L    Anion Gap, Calculation 7 5 - 18 mmol/L    Glucose 238 (H) 70 - 125 mg/dL    BUN 21 8 - 28 mg/dL    Creatinine 1.34 (H) 0.60 - 1.10 mg/dL    GFR MDRD Af Amer 46 (L) >60 mL/min/1.73m2    GFR MDRD Non Af Amer 38 (L) >60 mL/min/1.73m2    Bilirubin, Total 0.4 0.0 - 1.0 mg/dL    Calcium 9.8 8.5 - 10.5 mg/dL    Protein, Total 6.4 6.0 - 8.0 g/dL    Albumin 3.7 3.5 - 5.0 g/dL    Alkaline Phosphatase 71 45 - 120 U/L    AST 23 0 - 40 U/L    ALT 25 0 - 45 U/L   HM1 (CBC with Diff)   Result Value Ref Range    WBC 9.4 4.0 - 11.0 thou/uL    RBC 3.73 (L) 3.80 - 5.40 mill/uL    Hemoglobin 10.9 (L) 12.0 - 16.0 g/dL    Hematocrit 33.6 (L) 35.0 - 47.0 %    MCV 90 80 - 100 fL    MCH 29.2 27.0 - 34.0 pg    MCHC 32.3 32.0 - 36.0 g/dL    RDW 17.4 (H) 11.0 - 14.5 %    Platelets 233 140 - 440 thou/uL    MPV 8.1 7.0 - 10.0 fL    Neutrophils % 77 (H) 50 - 70 %    Lymphocytes % 10 (L) 20 - 40 %    Monocytes % 9 2 - 10 %    Eosinophils % 3 0 - 6 %    Basophils % 1 0 - 2 %    Neutrophils Absolute 7.2 2.0 - 7.7 thou/uL    Lymphocytes Absolute 1.0 0.8 - 4.4 thou/uL    Monocytes Absolute 0.8 0.0 - 0.9 thou/uL    Eosinophils Absolute 0.2 0.0 - 0.4 thou/uL    Basophils Absolute 0.1 0.0 - 0.2 thou/uL   Vitamin D, Total (25-Hydroxy)   Result Value Ref Range    Vitamin D, Total (25-Hydroxy) 41.2 30.0 - 80.0 ng/mL         Distress assessment and ECOG status      ECOG Performance:    ECOG Performance Status: 1    Distress Assessment  Distress Assessment Score: 2       Imaging Results       No results found.        Signed by: Jemal Evans MD

## 2021-06-09 NOTE — PROGRESS NOTES
"ASSESSMENT:  1.  Epigastric pain with history of metastatic lesion in the stomach:  She feels Carafate has been a substantial benefit she is now using his \"an as needed \"rather than every day.  I would advise continuing an acid suppressing drug on a daily basis.  2.  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  Her last hemoglobin A1c was in the normal range at 5.6.  Would advise cutting back further on Lantus.  She is reluctant to totally stop  3.  Osteoporosis risk:  Her last bone density study in 2011 was in the normal range.  She would be at risk for rapid decline in bone density given her metastatic colon cancer.  She is not interested in a recheck a bone density study  4.  History of paroxysmal atrial fibrillation and flutter:  No recent palpitations  5.  Ischemic heart disease:  Her recent stress test was reviewed.  It showed a small area of ischemia in the inferolateral segment.  She has done well with Inderal with no problems with chest  PLAN:  1.  Use Zantac daily.  Add Carafate \"as needed \"  2.  Continue Imdur.  Continue atorvastatin due to the known ischemic heart disease  3.  Decrease Lantus to 8 units.  Leave message with blood sugars in several weeks  4.  Check vitamin D level with next blood draw.  She does not want a DXA scan  5.  Follow-up in late April with hemoglobin A1c checked at that time.  Orders Placed This Encounter   Procedures     Vitamin D, Total (25-Hydroxy)     Standing Status:   Future     Standing Expiration Date:   2/21/2018     Medications Discontinued During This Encounter   Medication Reason     isosorbide mononitrate (IMDUR) 30 MG 24 hr tablet Reorder     sucralfate (CARAFATE) 1 gram tablet Reorder     LANTUS SOLOSTAR 100 unit/mL (3 mL) pen Reorder       No Follow-up on file.    ASSESSED PROBLEMS:  1. Other gastritis with bleeding  sucralfate (CARAFATE) 1 gram tablet    DISCONTINUED: sucralfate (CARAFATE) 1 gram " tablet   2. Abnormal nuclear stress test  isosorbide mononitrate (IMDUR) 30 MG 24 hr tablet   3. Vitamin D deficiency  Vitamin D, Total (25-Hydroxy)       CHIEF COMPLAINT:  Chief Complaint   Patient presents with     Follow-up     follow up stress test, discuss getting DEXA done       HISTORY OF PRESENT ILLNESS:  Blanca is a 83 y.o. female presenting to the clinic today with her daughter to follow up regarding her SVT, metastatic lesion, and diabetes.     SVT: She had presented to [facility] for an outpatient cardiac stress test on 1/18/17, however, she developed chest pain and supraventricular tachycardia during the test. Her stress test was abnormal. Her metoprolol dose was increased from 15 mg to 25 mg. She was also started on Imdur. She will follow up with cardiology to have a coronary angiogram. She denies chest pain or headaches. She denies rapid heart beat.     Metastatic lesion in the stomach: Gastric ulcer pathology was consistent with non-small cell lung cancer. She has been started on chemotherapy with Abraxane. She notes that Carafate was effective for pain, though costly.     Diabetes: When she checks her glucose at home, it is typically low. Her fasting glucose is usually under 100. She inquires if her new chemotherapy medications will affect her blood sugars. She is using 10 units of Lantus. She feels hypoglycemic, and occasionally feels lightheaded. Her last A1c from 1/16/17 was 5.6.     Health maintenance: She is due for a DXA scan, but declines this today. Her last DXA scan from 2011 showed normal bone mineral density.     REVIEW OF SYSTEMS:   All other systems are negative.    PFSH:  Reviewed, as below.     TOBACCO USE:  History   Smoking Status     Former Smoker     Packs/day: 0.10     Years: 30.00     Quit date: 10/30/1980   Smokeless Tobacco     Never Used     Comment: Not a steady smoker       VITALS:  Vitals:    02/21/17 1515   BP: 138/62   Patient Site: Left Arm   Patient Position: Sitting  "  Cuff Size: Adult Regular   Pulse: 64   Weight: 119 lb 11.2 oz (54.3 kg)   Height: 5' 1\" (1.549 m)     Wt Readings from Last 3 Encounters:   02/21/17 119 lb 11.2 oz (54.3 kg)   02/02/17 118 lb 9.6 oz (53.8 kg)   01/20/17 121 lb 11.2 oz (55.2 kg)     Body mass index is 22.62 kg/(m^2).    PHYSICAL EXAM:  Constitutional:   Reveals an alert, pleasant, healthy appearing female. She does not appear acutely ill.  Vitals: per nursing notes.  HEENT: Atraumatic.  Eyes: No conjunctival hyperemia or jaundice.  Oropharynx:   Mouth and throat clear, no thrush or exudate.  Neck:  Supple, no carotid bruits or adenopathy.  Back:  No spine or CVA pain.  Thorax:  No bony deformities.  Lungs: Clear to A&P without rales or wheezes.    Cardiac:   Regular rate and rhythm, normal S1, S2. II/VI systolic murmur left sternal border.  Abdomen:  Soft, no mass  Extremities:   No peripheral edema  Skin: Pale but clear      ADDITIONAL HISTORY SUMMARIZED (2): Reviewed Dr. Evans's note from 2/2/17 regarding lung cancer.   DECISION TO OBTAIN EXTRA INFORMATION (1): None.   RADIOLOGY TESTS (1): Reviewed DXA from 12/1/11.   LABS (1): Ordered labs today.   MEDICINE TESTS (1): None.  INDEPENDENT REVIEW (2 each): None.     The visit lasted a total of 27 minutes face to face with the patient. Over 50% of the time was spent counseling and educating the patient about medications.    I, Marley Huerta, am scribing for and in the presence of, Dr. Garcia.    I, Dr. Garcia, personally performed the services described in this documentation, as scribed by Marley Huerta in my presence, and it is both accurate and complete.    Dragon dictation was used for this note.  Speech recognition errors are a possibility.    MEDICATIONS:  Current Outpatient Prescriptions   Medication Sig Dispense Refill     amLODIPine (NORVASC) 5 MG tablet Take 0.5 tablets (2.5 mg total) by mouth daily. 90 tablet 3     aspirin 81 MG EC tablet Take 1 tablet (81 mg total) by mouth " daily with breakfast.  0     atorvastatin (LIPITOR) 80 MG tablet One half daily (Patient taking differently: Take 40 mg by mouth bedtime. ) 90 tablet 3     coenzyme Q10 (CO Q-10) 10 mg capsule Take 1 capsule by mouth daily.        isosorbide mononitrate (IMDUR) 30 MG 24 hr tablet Take 1 tablet (30 mg total) by mouth daily. 90 tablet 3     LANTUS SOLOSTAR 100 unit/mL (3 mL) pen Inject 8 Units under the skin bedtime. 11.65 Type 2 with hyperglycemia 3 mL PRN     magnesium 250 mg Tab Take 500 mg by mouth daily.        metoprolol succinate (TOPROL-XL) 50 MG 24 hr tablet Take 1.5 tablets (75 mg total) by mouth bedtime. 90 tablet 3     multivitamin (ONE A DAY) per tablet Take 1 tablet by mouth daily.       nitroglycerin (NITROSTAT) 0.4 MG SL tablet Place 1 tablet (0.4 mg total) under the tongue every 5 (five) minutes as needed for chest pain. 10 tablet 1     ranitidine (ZANTAC) 150 MG tablet TAKE 1 TABLET BY MOUTH TWICE DAILY (Patient taking differently: Take 150 mg by mouth 2 (two) times a day. ) 180 tablet 3     sucralfate (CARAFATE) 1 gram tablet One gm four times daily as needed for gastritis 120 tablet 5     No current facility-administered medications for this visit.        Total data points: 4

## 2021-06-09 NOTE — PROGRESS NOTES
Met with patient and her daughter today in clinic prior to her appt today.  She talked more about how she was still in disbelief of her recent PET scan results, and how things would go forward in the future.  I offered her emotional support and encouragement.  I told her to call me if she needed any additional resources.  Janett Manzano RN

## 2021-06-09 NOTE — PROGRESS NOTES
"ACUPUNCTURIST TREATMENT NOTE    Name: Blanca Oreilly  :  1933  MRN:  [id number]    Acupuncture Treatment  Patient Type: WW CCC  Intervention Reason: Pain, Neuropathy, Neuropathy 2  Pre-session Pain ratin  Post-session Pain ratin  Pre-session Neuropathy ratin  Post-session Neuropathy ratin  Pre-session Neuropathy 2 ratin  Post-session Neuropathy 2 ratin  Patient complaint:: pain at (L) tricep, neuropathy in hands and feet  Acupunture (Points):: GB 20, LI 4, LI 10, St 36, Maida 8, Sp 9, Sp 6, Maida 3, Ba Kranthi, Ki 1  Practitioner Observed: 30 minute treatment  Checklist: Progress Note Completed, Consent Reveiwed    \"Risks and benefits of acupuncture were discussed with patient. Consent for treatment was given. We thank you for the referral.\"     Mirian Dawson L.Ac.    Date:  3/13/2017  Time:  3:49 PM    "

## 2021-06-09 NOTE — PROGRESS NOTES
[facility] Hematology and Oncology Progress Note    Patient: Blanca Oreilly  MRN: [id number]  Date of Service: 04/05/2017        Reason for Visit    Chief Complaint   Patient presents with     HE Cancer       Assessment and Plan  Malignant neoplasm of upper lobe bronchus, right    Staging form: Lung, AJCC 7th Edition      Clinical: Stage IV (T4, N0, M1a) - Signed by Jemal Evans MD on 10/20/2014        Prognostic indicators: EGFR and ALK negative        Pathologic: No stage assigned - Unsigned        Prognostic indicators: EGFR and ALK negative     1.  This is a pleasant 83-year-old woman with stage IV non-small cell lung cancer, adenocarcinoma.  She was negative for EGFR and ALK mutations and positive with PDL 1.  She is currently on palliative Abraxane which she is getting every 3 weeks.  After long discussion she will go ahead and get cycle 8 today.  She will then see Dr. Evans in 3 weeks and they will decide what to do.  I did tell her that we could give her a break and not do anything for a couple of months and then repeat a CT scan or a PET scan.  That is making her quite nervous to be off treatment completely.  Dr. Evans said we could potentially do Avastin as maintenance but she is worried about her blood pressure.  We also would be worried about GI bleeding with Avastin so we would want to do a repeat EGD.  Per patient her cardiologist would be concern about her doing an EGD and the high risk nature of that.  He is going to see her cardiologist next week and will ask if they see any contraindication for that.    2. Anemia: chemo induced and usually cumulative. Overall asymptomatic except fatigue. Continue to monitor.     3.  Peripheral neuropathy: This is chemo induced and getting quite a bit worse.  She has increased her acupuncture to twice a day.  I did tell her that I think we are going to run into significant issues with her ADLs if we do not stop the chemo soon.  Patient is reluctant to do that.   She wants to continue for now.  We will readdress at next appointment.    4.  Chronic renal insufficiency: This is near her baseline.  We will continue to monitor it is slightly worse today.  Encourage good hydration.    5.  Diabetes.  She is currently on treatment for that and being managed by her primary care doctor.  Continue to follow with him and take her medications.    ECOG Performance   ECOG Performance Status: 1     Distress Assessment  Distress Assessment Score: 1    Pain  Currently in Pain: No/denies      Problem List    1. Chemotherapy-induced peripheral neuropathy     2. Malignant neoplasm of upper lobe bronchus, right  Oncology Staff Appointment   3. Antineoplastic chemotherapy induced anemia     4. Chronic Renal Insufficiency     5. Elevated fasting blood sugar        Past Treatment    Past Treatment Plans           Cycles Start Date Discontinued On Discontinue Reason       HE ONCOLOGY TREATMENT       Nivolumab 240 mg q2w 6 of 12 cycles started 4/28/2016 10/13/2016 Progression       Pembrolizumab 2mg/kg 4 of 12 cycles started 2/4/2016 4/28/2016 Progression       LUNG PACLITAXEL / CARBOPLATIN + BEVACIZUMAB 21D 4 of 6 cycles started 10/29/2015 1/28/2016 Therapy Complete       BEVACIZUMAB 15MG/KG + PEMETREXED 12 of 14 cycles started 2/11/2015 10/28/2015 Progression       LUNG PACLITAXEL / CARBOPLATIN + BEVACIZUMAB 21D 4 of 4 cycles started 11/5/2014 1/28/2015 Therapy Complete             ______________________________________________________________________________    History of Present Illness    Blanca Oreilly is a very pleasant 83 y.o. old female with a history of non-small cell lung cancer located in the right lung measuring 5.7 cm in size presenting with some shortness of breath associated with malignant pleural effusion in the month of October 2014.  Pleural tap was positive for malignant cells adenocarcinoma in nature.  CK 7 positive CK 20 negative TTF-1 positive.  Subsequently that she was  admitted due to worsening shortness of breath and her pleural effusion was tapped . She had Pleurx catheter placed but then removed as pleural fluid has reduced to few cc per week.   Her PET scan done at [facility] showed disease limited to thorax only. She has started on chemotherapy with carboplatin and Taxol with avastin. Has had 4 doses. Her PET scan showed nearly complete response. So we started her on Alimta and Avastin. She started that in February 2015.     She had CT in September 2015 which showed some worsening of subcrinal LN. Then had pet scan. That shows progression. Started back on Carboplatin and Taxol. We did give her Avastin but then we had to hold it due to protienuria. CT scan after 2 cycles showed improvement. PET scan after 4 showed partial response. She was tested for PD-L1 and was positive for both opdivo and Pembrolizumab. So we started on Pembrolizumab in late January 2016. However PET scan after 3 cycles showed progression. So in April we started on Opdivo.  Was in hospital in the beginning of September 2016 with GI bleed and was found to have gastric ulcer. She was started on PPI. Coumadin stopped. In October 2016 her PET scan showed progression. Opdivo has been stopped. Her PET scan also showed some fullness on her kidney. Her creatinine had gone up. She was then admitted and started on some IV fluids and seen by nephrology. 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 High doses of steroids actually give her diabetes. In the meantime she also lost her  was battling myelodysplastic syndrome. She had a follow-up endoscopy for the gastric ulcer and that came back positive for adenocarcinoma consistent with lung primary. She has been started on salvage chemotherapy with single agent Abraxane. She had 1 cycle in November.   Her last PET scan in March showed a complete response. She is here for  her next cycle. Wanting to know the overall plan. She is having worsening peripheral neuropathy.  Is actually going to start using a walker because of it.      Pain Status  Currently in Pain: No/denies    Review of Systems    Constitutional  Constitutional (WDL): Exceptions to WDL  Fatigue: Fatigue relieved by rest  Neurosensory  Neurosensory (WDL): Exceptions to WDL  Peripheral Motor Neuropathy: Asymptomatic, clinical or diagnostic observations only, intervention not indicated  Ataxia: Moderate symptoms, limiting instrumental ADL (d/t neuropathy)  Peripheral Sensory Neuropathy: Moderate symptoms, limiting instrumental ADL (sid fingers and feet)  Eye   Eye Disorder (WDL): Exceptions to WDL  Watering Eyes: Intervention not indicated (constant)  Ear  Ear Disorder (WDL): All ear disorder elements are within defined limits  Cardiovascular  Cardiovascular (WDL): All cardiovascular elements are within defined limits  Pulmonary  Respiratory (WDL): Within Defined Limits  Gastrointestinal  Gastrointestinal (WDL): Exceptions to WDL  Anorexia: Loss of appetite without alteration in eating habits  Diarrhea: Increase of <4 stools per day over baseline, mild increase in ostomy output compared to baseline (takes Immodium)  Genitourinary  Genitourinary (WDL): Exceptions to WDL  Urinary Frequency: Limiting instrumental ADL, medical management indicated  Lymphatic  Lymph (WDL): All lymph disorder elements are within defined limits  Musculoskeletal and Connective Tissue  Musculoskeletal and Connetive Tissue Disorders (WDL): Exceptions to WDL  Myalgia: Mild pain (left arm)  Integumentary  Integumentary (WDL): Exceptions to WDL  Alopecia: Hair loss of >50% normal for that individual that is readily apparent to others, a wig or hair piece is necessary if the patient desires to completely camouflage the hair loss, associated with psychosocial impact  Patient Coping  Patient Coping: Accepting  Distress Assessment  Distress Assessment  "Score: 1  Accompanied by  Accompanied by: Family Member    Past History  Past Medical History:   Diagnosis Date     A-fib      Anemia      Cancer of lung      Carotid stenosis      Chronic kidney disease      CKD (chronic kidney disease)      Coronary artery disease      Diabetes mellitus 11/1/2016     Diabetes mellitus, type II      Exacerbation of asthma 4/4/2007     High cholesterol      HTN (hypertension)      Hyperlipidemia      Hypertension      Peripheral neuropathy      Pleural effusion      Primary lung adenocarcinoma     Stage IV NSCLC     Upper GI bleed        PHYSICAL EXAM:  /63  Pulse 61  Temp 97.5  F (36.4  C) (Oral)   Ht 5' 1\" (1.549 m)  Wt 119 lb (54 kg)  SpO2 97%  BMI 22.48 kg/m2  GENERAL: no acute distress. Cooperative in conversation. Here with daughter  HEENT: pupils are equal, round and reactive. Oromucosa is clean and intact. No ulcerations or mucositis noted. No bleeding noted.  RESP: lungs are clear bilaterally per auscultation. Regular respiratory rate. No wheezes or rhonchi.  CV: Regular, rate and rhythm. No murmurs.  ABD: soft, nontender. Positive bowel sounds. No organomegaly.   MUSCULOSKELETAL: No lower extremity swelling.   NEURO: non focal. Alert and oriented x3.  Altered sensation in her feet.  She does walk slowly.  She is almost having a left foot drop.  PSYCH: within normal limits. No depression or anxiety.  SKIN: warm dry intact   LYMPH: no cervical, supraclavicular lymphadenopathy      Lab Results    Recent Results (from the past 168 hour(s))   Comprehensive Metabolic Panel   Result Value Ref Range    Sodium 139 136 - 145 mmol/L    Potassium 4.0 3.5 - 5.0 mmol/L    Chloride 104 98 - 107 mmol/L    CO2 29 22 - 31 mmol/L    Anion Gap, Calculation 6 5 - 18 mmol/L    Glucose 235 (H) 70 - 125 mg/dL    BUN 26 8 - 28 mg/dL    Creatinine 1.42 (H) 0.60 - 1.10 mg/dL    GFR MDRD Af Amer 43 (L) >60 mL/min/1.73m2    GFR MDRD Non Af Amer 35 (L) >60 mL/min/1.73m2    Bilirubin, Total " 0.5 0.0 - 1.0 mg/dL    Calcium 9.9 8.5 - 10.5 mg/dL    Protein, Total 6.0 6.0 - 8.0 g/dL    Albumin 3.5 3.5 - 5.0 g/dL    Alkaline Phosphatase 75 45 - 120 U/L    AST 18 0 - 40 U/L    ALT 18 0 - 45 U/L   HM1 (CBC with Diff)   Result Value Ref Range    WBC 12.9 (H) 4.0 - 11.0 thou/uL    RBC 3.59 (L) 3.80 - 5.40 mill/uL    Hemoglobin 10.5 (L) 12.0 - 16.0 g/dL    Hematocrit 31.8 (L) 35.0 - 47.0 %    MCV 88 80 - 100 fL    MCH 29.2 27.0 - 34.0 pg    MCHC 33.0 32.0 - 36.0 g/dL    RDW 18.0 (H) 11.0 - 14.5 %    Platelets 253 140 - 440 thou/uL    MPV 7.9 7.0 - 10.0 fL    Neutrophils % 84 (H) 50 - 70 %    Lymphocytes % 7 (L) 20 - 40 %    Monocytes % 6 2 - 10 %    Eosinophils % 3 0 - 6 %    Basophils % 0 0 - 2 %    Neutrophils Absolute 10.8 (H) 2.0 - 7.7 thou/uL    Lymphocytes Absolute 1.0 0.8 - 4.4 thou/uL    Monocytes Absolute 0.8 0.0 - 0.9 thou/uL    Eosinophils Absolute 0.4 0.0 - 0.4 thou/uL    Basophils Absolute 0.0 0.0 - 0.2 thou/uL       Imaging    Nm Pet Ct Skull To Mid Thigh    Result Date: 3/13/2017  PET FDG/CT 3/13/2017 1:11 PM INDICATION: Lung cancer restaging, subsequent treatment strategy TECHNIQUE: Serum glucose level 98 mg/dL. One hour post left antecubital intravenous administration of 8.8 mCi F-18 FDG, PET imaging was performed from the skull base to the mid thighs utilizing attenuation correction with concurrent axial CT and PET/CT image fusion. Dose reduction techniques were used. COMPARISON: PET CT from 10/11/2016 FINDINGS: HEAD AND NECK: Mild generalized cerebral and cerebellar volume loss, likely age-related. Bilateral supraclavicular lymphadenopathy has resolved. CHEST: No abnormal FDG activity. Lymphadenopathy has completely resolved. Calcified atherosclerosis, including coronary. ABDOMEN/PELVIS: No abnormal FDG activity. Lymphadenopathy has completely resolved. Left kidney has returned to normal. Calcified splenic granuloma. Atrophic right kidney. Marked calcified atherosclerosis. Pelvic phleboliths.  MUSCULOSKELETAL: Left posterior acetabular metastasis has become sclerotic and non-FDG avid. Mild degenerative change in the spine.     CONCLUSION: 1.  Complete response 2.  Left nephritis has resolved.        Signed by: Kay Kim, CNP

## 2021-06-09 NOTE — PROGRESS NOTES
"ACUPUNCTURIST TREATMENT NOTE    Name: Blanca Oreilly  :  1933  MRN:  [id number]    Acupuncture Treatment  Patient Type: WW CCC  Intervention Reason: Neuropathy, Neuropathy 2  Pre-session Neuropathy ratin  Post-session Neuropathy ratin  Pre-session Neuropathy 2 ratin  Post-session Neuropathy 2 ratin  Patient complaint:: neuropathy in fingers and feet  Acupunture (Points):: LI 10, LI 4, TW 5, St 36, Sp 9, Sp 6, Maida 3, Ki 3, Sp 3, Ba Kranthi  Practitioner Observed: 30 minute treatment  Checklist: Progress Note Completed, Consent Reveiwed    \"Risks and benefits of acupuncture were discussed with patient. Consent for treatment was given. We thank you for the referral.\"     Mirian Dawson L.Ac.    Date:  2017  Time:  4:04 PM    "

## 2021-06-09 NOTE — PROGRESS NOTES
"ACUPUNCTURIST TREATMENT NOTE    Name: Blanca Oreilly  :  1933  MRN:  [id number]    Acupuncture Treatment  Patient Type: WW CCC  Intervention Reason: Neuropathy, Neuropathy 2  Pre-session Neuropathy ratin  Post-session Neuropathy ratin  Pre-session Neuropathy 2 ratin  Post-session Neuropathy 2 ratin  Patient complaint:: neuropathy in hands and feet; dizziness  Acupunture (Points):: GB 20, LI 4, SI 3, Ba Lurdes, St 36, Sp 9, St 40, Ki 3, Ba Lurdes  Practitioner Observed: 30 minute treatment  Checklist: Progress Note Completed, Consent Reveiwed    \"Risks and benefits of acupuncture were discussed with patient. Consent for treatment was given. We thank you for the referral.\"     Mirian Dawson L.Ac.    Date:  3/21/2017  Time:  2:58 PM    "

## 2021-06-09 NOTE — PROGRESS NOTES
"ACUPUNCTURIST TREATMENT NOTE    Name: Blanca Oreilly  :  1933  MRN:  [id number]    Acupuncture Treatment  Patient Type: WW CCC  Intervention Reason: Neuropathy, Neuropathy 2  Pre-session Neuropathy ratin  Post-session Neuropathy ratin  Pre-session Neuropathy 2 ratin  Post-session Neuropathy 2 ratin  Patient complaint:: neuropathy in both hands and both feet; dizziness and blurred vision  Acupunture (Points):: Du 20, SSC, LI 4, SI 3, TW 6, LI 10, St 36, Maida 8, Sp 6, Maida 3, Sp 3, Ki 3, TW 17, GB 20  Practitioner Observed: 30 minute treatment  Checklist: Progress Note Completed, Consent Reveiwed    \"Risks and benefits of acupuncture were discussed with patient. Consent for treatment was given. We thank you for the referral.\"     Mirian Dawson L.Ac.    Date:  2017  Time:  9:39 AM    "

## 2021-06-09 NOTE — PROGRESS NOTES
"ACUPUNCTURIST TREATMENT NOTE    Name: Blanca Oreilly  :  1933  MRN:  [id number]    Acupuncture Treatment  Patient Type: WW CCC  Intervention Reason: Neuropathy, Neuropathy 2  Pre-session Neuropathy ratin  Post-session Neuropathy ratin  Pre-session Neuropathy 2 ratin  Post-session Neuropathy 2 ratin  Patient complaint:: Neuropathy of fingers and feet  Acupunture (Points):: Sifeng, Bafeng, Sp 3, 6, Kid 3, Maida 6, Gb 41, (L) Li 15, (L) Tb 14, (L) Jianqian  Practitioner Observed: 30 minute treatment.  Checklist: Progress Note Completed, Consent Reveiwed    \"Risks and benefits of acupuncture were discussed with patient. Consent for treatment was given. We thank you for the referral.\"     Shar Sandoval    Date:  3/28/2017  Time:  2:14 PM    "

## 2021-06-09 NOTE — PROGRESS NOTES
ASSESSMENT:  1.  Black stool: Blanca is concerned about recurrent GI bleeding.  She did have a known metastasis from lung cancer to the stomach which had been bleeding in the past.  She feels this has improved after resuming Carafate.  A hemoglobin and stool Hemoccult will be checked.  2.  Metastatic lung cancer:  Her most recent PET scan showed dramatic improvement with no active malignancy detected  3.  Ischemic heart disease:  No recent anginal chest pain.  Her stress test showed a relatively small area of ischemia.  I would advise continuing low-dose aspirin if there is no evidence for active bleeding on tests from today  4.  Renal insufficiency:  Recent PET scan showed no evidence for nephritis.  Renal function studies will be rechecked  5.  Hypertension:  Blood pressure remains acceptable  6.  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  I would expect that blood sugars would be in the normal range without steroids  PLAN:  1.  Check stool Hemoccult, hemogram, and basic metabolic panel  2.  Assuming no evidence for active bleeding, I would advise using Carafate twice daily and continuing low-dose aspirin.  3.  Check hemoglobin A1c  4.  Clinic follow-up in 3 months or as needed  There are no discontinued medications.        ASSESSED PROBLEMS:  No diagnosis found.    CHIEF COMPLAINT:  Chief Complaint   Patient presents with     Follow-up     cancer free PET scan, Stress test last Jan, due for DEXA and Mammo     Abdominal Pain     is having very dark black stools.  wants HGB checked       HISTORY OF PRESENT ILLNESS:  Blanca is a 83 y.o. female presenting to the clinic today for follow up for multiple issues and abdominal pain. She is accompanied by her daughter Amanda.     Stage IV NSC Lung Cancer: She is followed by Dr. Evans. She will have one more chemotherapy treatment. A PET-scan of 2/23/17 was cancer-free.     Neuropathy: Worsening. She is using acupuncture to help.     History of  "Gastric Ulcer: She reports discomfort in her right abdomen. She is also experiencing rectal bleeding and reports one black stool 2 days ago on Saturday. Her stool has resumed normal color now. She believes her symptoms are due to eating spicy soup recently.     Hypertension: She reports home readings in the range of 110-150 systolic. She did get an elevated number on Saturday following her black stool. Her BP in the clinic is 124/50. She continues on amlodipine 2.5mg and metoprolol 75mg daily.     Diabetes: She reports a wide range in evening blood sugar readings--range of . Her mealtime readings remain in the mid 100s. She takes Lantus 8units daily.     Health Maintenance: She attempted a screening colonoscopy many years ago but she was afraid of the anetsthetic and due to tortuous colon and discomfort, the procedure was halted. She did have an abdominal CT which was normal.      REVIEW OF SYSTEMS:   She wonders if she should continue taking baby aspirin. All other systems are negative.    PFSH:  Her daughter Amanda is currently visiting.   TOBACCO USE:  History   Smoking Status     Former Smoker     Packs/day: 0.10     Years: 30.00     Quit date: 10/30/1980   Smokeless Tobacco     Never Used     Comment: Not a steady smoker       VITALS:  Vitals:    03/27/17 1033   BP: 124/50   Patient Site: Left Arm   Patient Position: Sitting   Cuff Size: Adult Regular   Pulse: 60   Weight: 119 lb 4.8 oz (54.1 kg)   Height: 5' 1\" (1.549 m)     Wt Readings from Last 3 Encounters:   03/27/17 119 lb 4.8 oz (54.1 kg)   03/15/17 119 lb 14.4 oz (54.4 kg)   02/23/17 120 lb 1.6 oz (54.5 kg)     Body mass index is 22.54 kg/(m^2).    PHYSICAL EXAM:  Constitutional:   Reveals an alert, pleasant, pale woman.  Affect appropriate. Does not seem acutely ill. Vitals: per nursing notes.  HEENT:  Oropharynx:   Mouth and throat clear, no thrush or exudate.  Neck:  Supple, no carotid bruits or adenopathy.  Back:  No spine or CVA pain.  Thorax:  " No bony deformities.  Lungs: Clear to A&P without rales or wheezes.  Respiratory effort normal.  Cardiac:   Regular rate and rhythm, normal S1, S2, no murmur.  Abdomen:  Soft, active bowel sounds without bruits, mass, or tenderness.  Extremities:   No edema.     Skin:  Pale and clear.     ADDITIONAL HISTORY SUMMARIZED (2): Note of Dr. Evans 2/2/17 reviewed.   DECISION TO OBTAIN EXTRA INFORMATION (1): MICHELLE for Care Everywhere obtained.   RADIOLOGY TESTS (1): Reviewed PET scan 2/23/17. .  LABS (1): Labs ordered.   MEDICINE TESTS (1): Reviewed stress test of 1/16/17.   INDEPENDENT REVIEW (2 each): None.     The visit lasted a total of 21 minutes face to face with the patient. Over 50% of the time was spent counseling and educating the patient about her medical conditions.    I, Chauncey Yip, am scribing for and in the presence of, Dr. Garcia.    I, Dr. Garcia, personally performed the services described in this documentation, as scribed by Chauncey Yip in my presence, and it is both accurate and complete.    Dragon dictation was used for this note.  Speech recognition errors are a possibility.    MEDICATIONS:  Current Outpatient Prescriptions   Medication Sig Dispense Refill     amLODIPine (NORVASC) 5 MG tablet Take 0.5 tablets (2.5 mg total) by mouth daily. 90 tablet 3     aspirin 81 MG EC tablet Take 1 tablet (81 mg total) by mouth daily with breakfast.  0     atorvastatin (LIPITOR) 80 MG tablet One half daily (Patient taking differently: Take 40 mg by mouth bedtime. ) 90 tablet 3     coenzyme Q10 (CO Q-10) 10 mg capsule Take 1 capsule by mouth daily.        isosorbide mononitrate (IMDUR) 30 MG 24 hr tablet Take 1 tablet (30 mg total) by mouth daily. 90 tablet 3     LANTUS SOLOSTAR 100 unit/mL (3 mL) pen Inject 8 Units under the skin bedtime. 11.65 Type 2 with hyperglycemia 3 mL PRN     loperamide (IMODIUM) 2 mg capsule Take 2 mg by mouth 4 (four) times a day as needed for diarrhea.       magnesium 250 mg Tab Take  500 mg by mouth daily.        metoprolol succinate (TOPROL-XL) 50 MG 24 hr tablet Take 1.5 tablets (75 mg total) by mouth bedtime. 90 tablet 3     multivitamin (ONE A DAY) per tablet Take 1 tablet by mouth daily.       nitroglycerin (NITROSTAT) 0.4 MG SL tablet Place 1 tablet (0.4 mg total) under the tongue every 5 (five) minutes as needed for chest pain. 10 tablet 1     ranitidine (ZANTAC) 150 MG tablet TAKE 1 TABLET BY MOUTH TWICE DAILY (Patient taking differently: Take 150 mg by mouth 2 (two) times a day. ) 180 tablet 3     sucralfate (CARAFATE) 1 gram tablet One gm four times daily as needed for gastritis 120 tablet 5     No current facility-administered medications for this visit.        Total data points: 6

## 2021-06-09 NOTE — PROGRESS NOTES
"ACUPUNCTURIST TREATMENT NOTE    Name: Blanca Oreilly  :  1933  MRN:  [id number]    Acupuncture Treatment  Patient Type: [facility] CCC  Intervention Reason: Neuropathy, Neuropathy 2  Pre-session Neuropathy ratin  Post-session Neuropathy ratin  Pre-session Neuropathy 2 ratin  Post-session Neuropathy 2 ratin  Patient complaint:: neuropathy in fingers and feet  Acupunture (Points):: LI 10, LI 4, St 36, GB 34, Sp 6, Maida 3, Ki 3, Ba Kranthi  Practitioner Observed: 30 minute treatment  Checklist: Progress Note Completed, Consent Reveiwed    \"Risks and benefits of acupuncture were discussed with patient. Consent for treatment was given. We thank you for the referral.\"     Mirian Dawson L.Ac.    Date:  4/3/2017  Time:  2:49 PM    "

## 2021-06-10 NOTE — PROGRESS NOTES
"ACUPUNCTURIST TREATMENT NOTE    Name: Blanca Oreilly  :  1933  MRN:  [id number]    Acupuncture Treatment  Patient Type: [facility] CCC  Intervention Reason: Fatigue, Neuropathy, Neuropathy 2  Pre-session Neuropathy ratin  Post-session Neuropathy ratin  Pre-session Neuropathy 2 ratin  Post-session Neuropathy 2 ratin  Pre Fatigue: 5  Post Fatigue: 6  Patient complaint:: fatigue, neuropathy in hands and feet bilateral. weakness in (L) foot  Acupunture (Points):: Yin Bruno, LI 11, LI 4, St 36, Sp 9, Maida 8, Maida 3, Ki 3, Sp 3, Ki 7  Practitioner Observed: 30 minute treatment  Checklist: Progress Note Completed, Consent Reveiwed    \"Risks and benefits of acupuncture were discussed with patient. Consent for treatment was given. We thank you for the referral.\"     Mirian Dawson L.Ac.    Date:  2017  Time:  3:17 PM    "

## 2021-06-10 NOTE — PROGRESS NOTES
"ACUPUNCTURIST TREATMENT NOTE    Name: Blanca Oreilly  :  1933  MRN:  [id number]    Acupuncture Treatment  Patient Type: [facility] CCC  Intervention Reason: Fatigue, Neuropathy, Neuropathy 2  Pre-session Neuropathy ratin  Post-session Neuropathy ratin  Pre-session Neuropathy 2 ratin  Post-session Neuropathy 2 ratin  Pre-session Other ratin  Post-session Other ratin  Pre Fatigue: 5  Post Fatigue: 6  Patient complaint:: fatigue, weakness, neuropathy in fingers, feet, and face  Acupunture (Points):: Yin Bruno, Gaurav Mullins, St 36, Maida 8, Ki 7, Sp 6, Maida 3, Ki 3, Sp 3  Practitioner Observed: 30 minute treatment  Checklist: Progress Note Completed, Consent Reveiwed    \"Risks and benefits of acupuncture were discussed with patient. Consent for treatment was given. We thank you for the referral.\"     Mirian Dawson L.Ac.    Date:  2017  Time:  3:23 PM    "

## 2021-06-10 NOTE — PROGRESS NOTES
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    PLAN: Toenails were debrided manually and mechanically x10. Return to clinic in nine weeks.         SUBJECTIVE: The patient returns to the [facility] for help with her toenails which are long, thick and painful. She has altered sensation in fingers and toes following chemotherapy treatments for cancer.  She is also diabetic. She has not seen bleeding or drainage. She denies any foot trauma.  Last clinic visit was January 16, 2017.    OBJECTIVE:  General: Pleasant 83 y.o. female in no acute distress.  Vascular: DP pulses are palpable. PT pulses are palpable. Pedal hair is diminished. Feet are cool to the touch.  Neuro: Sensation in the feet is altered. Patient describes paresthesias.  Derm: Toenails are elongated, thickened and dystrophic with discoloration and subungual debris. Skin is thin and shiny but intact.   Musculoskeletal: Hammertoes.

## 2021-06-10 NOTE — PROGRESS NOTES
"ACUPUNCTURIST TREATMENT NOTE    Name: Blanca Oreilly  :  1933  MRN:  [id number]    Acupuncture Treatment  Patient Type: [facility] CCC  Intervention Reason: Fatigue, Neuropathy, Neuropathy 2  Pre-session Neuropathy ratin  Post-session Neuropathy ratin  Pre-session Neuropathy 2 ratin  Post-session Neuropathy 2 ratin  Pre Fatigue: 4  Post Fatigue: 6  Patient complaint:: fatigue, fluid in the lungs, neuropathy in hands and feet  Acupuncture (Points):: Rickey 6, LI 11, TW 5, LI 4, Gena 7, Sp 9, St 36, Ki 7, Ki 3, Sp 3, Maida 3  Practitioner Observed: 30 minute treatment  Checklist: Progress Note Completed, Consent Reveiwed    \"Risks and benefits of acupuncture were discussed with patient. Consent for treatment was given. We thank you for the referral.\"     Mirian Dawson L.Ac.    Date:  2017  Time:  3:23 PM    "

## 2021-06-10 NOTE — PROGRESS NOTES
"ACUPUNCTURIST TREATMENT NOTE    Name: Blanca Oreilly  :  1933  MRN:  [id number]    Acupuncture Treatment  Patient Type: [facility] CCC  Intervention Reason: Fatigue, Insomnia, Neuropathy, Neuropathy 2  Pre-session Stress/Anxiety ratin  Post-session Stress/Anxiety ratin  Pre-session Neuropathy ratin  Post-session Neuropathy ratin  Pre-session Neuropathy 2 ratin  Post-session Neuropathy 2 ratin  Pre Fatigue: 4  Post Fatigue: 6  Patient complaint:: fatigue; fluid in lungs causing dyspnea; neuropathy in hands and feet, recent insomnia d/t new tx medication  Acupuncture (Points):: Gena 1, Rickey 17, Gena 7, Ht 7, Sp 9, St 36, Sp 6, Ki 3, Maida 3  Practitioner Observed: 30 minute treatment  Checklist: Progress Note Completed, Consent Reveiwed    \"Risks and benefits of acupuncture were discussed with patient. Consent for treatment was given. We thank you for the referral.\"     Mirian Dawson L.Ac.    Date:  2017  Time:  1:53 PM    "

## 2021-06-10 NOTE — PROGRESS NOTES
"ACUPUNCTURIST TREATMENT NOTE    Name: Blanca Oreilly  :  1933  MRN:  [id number]    Acupuncture Treatment  Patient Type: [facility] CCC  Intervention Reason: Fatigue, Neuropathy, Neuropathy 2, Other  Pre-session Neuropathy ratin  Post-session Neuropathy ratin  Pre-session Neuropathy 2 ratin  Post-session Neuropathy 2 ratin  Pre-session Other ratin  Post-session Other ratin  Patient complaint:: fatigue, weakness, neuropathy in face, hands, feet, insomnia  Acupunture (Points):: An Clarence, Yin Bruno, Gaurav Mullins, Ht 7, Rickey 6, St 36, Ki 7, Maida 3, Ki 3  Practitioner Observed: 30 minute treatment  Checklist: Progress Note Completed, Consent Reveiwed    \"Risks and benefits of acupuncture were discussed with patient. Consent for treatment was given. We thank you for the referral.\"     Mirian Dawson L.Ac.    Date:  2017  Time:  2:43 PM    "

## 2021-06-10 NOTE — PROGRESS NOTES
[facility] Hematology and Oncology Progress Note    Patient: Blanca Oreilly  MRN: [id number]  Date of Service: 5/25/2017           Reason for visit      1. Malignant neoplasm of upper lobe bronchus, right    NSCLC; EGFR and ALK-1 negative. PD-L1 strongly positive for both 22C3, 28-8    Assessment     1. Very pleasant 83 y.o. woman with stage IV NSCLC, adenocarcinoma. Negative for EGFR  And ALK-1. We also checked PD-L1 and she is strongly positive.  2. Initially had 4 cycles of carboplatin, taxol and avastin. Then was on Alimta and Avastin.  Her CT scan showed slight increased precrinal and subcrinal nodes.  PET scan also confirmed progression. She has had 4 cycles Carboplatin and Taxol. PET scan showed good response.  Started on Pembrolizumab but after 4 cycles there was some progression.  She then tried and progressed on Opdivo.  Most Recently she was on Abraxane from November 2016 until April 2017. PET scan showed complete response. Stopped due to side effects. Now progressing rather quickly.  3. Gastric ulcer pathology is consistent with non-small cell lung cancer.  Clinically she seems to be doing well on that her hemoglobin is getting better and she has not had any more blood in her stools.  4. Some neurologic changes in the form of speech and some walking issues. Some could be from her peripheral neuropathy however I am worried that this might be from some other central nervous system reasons as well. MRI brain was normal.   5. 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  6. Anxiety and depression.  7. Renal failure for which she was on prednisone and it is getting stable.  8. Positive stress test for which now she is on nitrates.    Plan     1. I would recommend starting on Atezulizumab. She can start tomorrow.  2. RTC in 3 weeks.  3. Continue insulin as directed to manage her blood sugars.  4. She will need a PET scan in August to assess response.    Clinical stage      Lung cancer, Right side     Primary site: Lung (Right)    Staging method: AJCC 7th Edition    Clinical: Stage IV (T4, N0, M1a) signed by Jemal Evans MD on 10/20/2014  2:19 PM    Summary: Stage IV (T4, N0, M1a)      History     Blanca Oreilly is a very pleasant 83 y.o. old female with a history of  non-small cell lung cancer located in the right lung measuring 5.7 cm in size presenting with some shortness of breath associated with malignant pleural effusion in the month of October 2014.  Pleural tap was positive for malignant cells adenocarcinoma in nature.  CK 7 positive CK 20 negative TTF-1 positive.  Subsequently that she was admitted due to worsening shortness of breath and her pleural effusion was tapped . She had Pleurx catheter placed but then removed as pleural fluid has reduced to few cc per week.   Her PET scan done at [facility] showed disease limited to thorax only. She has started on chemotherapy with carboplatin and Taxol with avastin. Has had 4 doses. Her PET scan showed nearly complete response. So we started her on Alimta and Avastin. She started that in February 2015.    She had CT in September 2015 which showed some worsening of subcrinal LN. Then had pet scan. That shows progression. Started back on Carboplatin and Taxol. We did give her Avastin but then we had to hold it due to protienuria. CT scan after 2 cycles showed improvement. PET scan after 4 showed partial response. She was tested for PD-L1 and was positive for both opdivo and Pembrolizumab. So we started on Pembrolizumab in late January 2016.  However PET scan after 3 cycles showed progression. So in April we started on Opdivo.    Was in hospital in the beginning of September 2016 with GI bleed and was found to have gastric ulcer.  She was started on PPI. Coumadin stopped.  In October 2016 her PET scan  showed progression.  Opdivo has been stopped.  Her PET scan also showed some fullness on her kidney.  Her creatinine had gone up.  She was then admitted and started  on some IV fluids and seen by nephrology.  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  High doses of steroids actually give her diabetes.  In the meantime she also lost her  was battling myelodysplastic syndrome.  She had a follow-up endoscopy for the gastric ulcer and that came back positive for adenocarcinoma consistent with lung primary.  She has been started on salvage chemotherapy with single agent Abraxane.  She had 1 cycle in November.  She is here for her next cycle.    She is managing her diabetes well.  Dr. Mayo started her on insulin.  Her blood sugars are getting better.    Tolerating abraxane well. Blood sugars ok.  The scan after 3 cycles showed significant improvement in the mediastinal as well as abdominal lymphadenopathy.  She is not having any more GI bleeding.  Her hemoglobin is going up.  She did have an abnormal stress test for which she ended up being in the hospital.  A reversible ischemic defect was noted.  She has been put on nitrates.  She is otherwise doing well.  She has not had any EGD since starting her chemotherapy.  She had PET scan in March which actually showed complete response.    She continued Abraxane however her neuropathy seems to have gotten worse.  We stopped treatment in April 2017.     Comes in today after PET scan. Her neurological symptoms have gotten better somewhat. She is very anxious.       Past Medical History     Past Medical History:   Diagnosis Date     A-fib      Anemia      Cancer of lung      Carotid stenosis      Chronic kidney disease      CKD (chronic kidney disease)      Coronary artery disease      Diabetes mellitus 11/1/2016     Diabetes mellitus, type II      Exacerbation of asthma 4/4/2007     High cholesterol      HTN (hypertension)      Hyperlipidemia      Hypertension      Peripheral neuropathy      Pleural effusion      Primary lung  adenocarcinoma     Stage IV NSCLC     Upper GI bleed      Review of Systems   Constitutional  Constitutional (WDL): Exceptions to WDL  Fatigue: Fatigue relieved by restDenies any complaints.  Neurosensory  Neurosensory (WDL): Exceptions to WDL  Peripheral Motor Neuropathy: Asymptomatic, clinical or diagnostic observations only, intervention not indicated  Ataxia: Moderate symptoms, limiting instrumental ADL  Peripheral Sensory Neuropathy: Moderate symptoms, limiting instrumental ADL  Cardiovascular  Cardiovascular (WDL): All cardiovascular elements are within defined limits  Pulmonary  Cough: Mild symptoms, nonprescription intervention indicated  Gastrointestinal  Gastrointestinal (WDL): All gastrointestinal elements are within defined limits  Genitourinary  Genitourinary (WDL): Exceptions to WDL  Urinary Frequency: Limiting instrumental ADL, medical management indicated  Integumentary  Integumentary (WDL): Exceptions to WDL  Alopecia: Hair loss of >50% normal for that individual that is readily apparent to others, a wig or hair piece is necessary if the patient desires to completely camouflage the hair loss, associated with psychosocial impact  Patient Coping  Patient Coping: Anxiety  ECOG Performance   1  Pain Status  Currently in Pain: No/denies  Accompanied by  Accompanied by: Family Member      Vital Signs     Vitals:    05/25/17 1026   BP: (!) 203/83   Pulse: 61   Temp: 98  F (36.7  C)   SpO2: 98%       Physical Exam     GENERAL: no acute distress. Cooperative in conversation.   HEENT: Starting to have alopecia. pupils are equal, round and reactive. Oral mucosa is clean and intact. No ulcerations or mucositis noted. No bleeding noted.  RESP:Chest symmetric lungs are clear bilaterally per auscultation. Regular respiratory rate. No wheezes or rhonchi.    CV: Normal S1 S2 Regular, rate and rhythm. No murmurs.  ABD: Nondistended, soft, slight epigastric tenderness. Positive bowel sounds. No organomegaly.    EXTREMITIES: No lower extremity edema.  Noticing slight increased edema around the lower part of her ankles.  NEURO: non focal. Alert and oriented x3. Slight weakness on the right side of her face.  She has some trouble walking.   PSYCH: within normal limits. No depression or anxiety.  SKIN: warm dry intact .      Lab Results     Results for orders placed or performed in visit on 05/25/17   Comprehensive Metabolic Panel   Result Value Ref Range    Sodium 141 136 - 145 mmol/L    Potassium 4.2 3.5 - 5.0 mmol/L    Chloride 103 98 - 107 mmol/L    CO2 29 22 - 31 mmol/L    Anion Gap, Calculation 9 5 - 18 mmol/L    Glucose 243 (H) 70 - 125 mg/dL    BUN 23 8 - 28 mg/dL    Creatinine 1.40 (H) 0.60 - 1.10 mg/dL    GFR MDRD Af Amer 44 (L) >60 mL/min/1.73m2    GFR MDRD Non Af Amer 36 (L) >60 mL/min/1.73m2    Bilirubin, Total 0.5 0.0 - 1.0 mg/dL    Calcium 10.5 8.5 - 10.5 mg/dL    Protein, Total 6.6 6.0 - 8.0 g/dL    Albumin 3.6 3.5 - 5.0 g/dL    Alkaline Phosphatase 73 45 - 120 U/L    AST 19 0 - 40 U/L    ALT 23 0 - 45 U/L   HM1 (CBC with Diff)   Result Value Ref Range    WBC 13.1 (H) 4.0 - 11.0 thou/uL    RBC 3.94 3.80 - 5.40 mill/uL    Hemoglobin 11.6 (L) 12.0 - 16.0 g/dL    Hematocrit 36.5 35.0 - 47.0 %    MCV 93 80 - 100 fL    MCH 29.4 27.0 - 34.0 pg    MCHC 31.8 (L) 32.0 - 36.0 g/dL    RDW 15.9 (H) 11.0 - 14.5 %    Platelets 219 140 - 440 thou/uL    MPV 10.3 8.5 - 12.5 fL    Neutrophils % 81 (H) 50 - 70 %    Lymphocytes % 6 (L) 20 - 40 %    Monocytes % 7 2 - 10 %    Eosinophils % 6 0 - 6 %    Basophils % 1 0 - 2 %    Neutrophils Absolute 10.5 (H) 2.0 - 7.7 thou/uL    Lymphocytes Absolute 0.8 0.8 - 4.4 thou/uL    Monocytes Absolute 0.9 0.0 - 0.9 thou/uL    Eosinophils Absolute 0.8 (H) 0.0 - 0.4 thou/uL    Basophils Absolute 0.1 0.0 - 0.2 thou/uL         Distress assessment and ECOG status      ECOG Performance:    ECOG Performance Status: 1    Distress Assessment  Distress Assessment Score: 9       Imaging Results        Mr Head With Without Contrast    Result Date: 4/28/2017  [facility] HEAD MRI WITHOUT AND WITH IV CONTRAST 4/28/2017 3:20 PM INDICATION: History of metastatic lung cancer. Weakness in hands and feet. TECHNIQUE: Head MRI without and with intravenous contrast. CONTRAST: 5.5ml Gadavist COMPARISON:  01/28/2015 FINDINGS: No acute infarct/restricted diffusion. No mass lesion, hemorrhage, or extra-axial fluid collections. Mild to moderate generalized cerebral atrophy. Mild cerebellar atrophy. Few small foci of nonspecific T2/FLAIR hyperintense signal in the cerebral white matter and bereket consistent with mild chronic small vessel ischemic changes. The pituitary gland is unremarkable. The major intracranial vascular flow voids are maintained. The orbits are unremarkable. The calvarium and skull base are unremarkable.  Mild membrane thickening scattered about the paranasal sinuses. The mastoid air cells are clear. No abnormal contrast enhancement identified.     CONCLUSION: 1.  No acute infarcts, mass lesions or hemorrhage. 2.  No evidence of intracranial metastatic disease. 3.  Age-related changes as detailed above. Similar to 1/28/2015.     Nm Pet Ct Skull To Mid Thigh    Result Date: 5/22/2017  PET FDG/CT 5/22/2017 9:50 AM INDICATION: Right lung cancer restaging, subsequent treatment strategy TECHNIQUE: Serum glucose level 137 mg/dL. One hour post right antecubital intravenous administration of 7.1 mCi F-18 FDG, PET imaging was performed from the skull base to the mid thighs utilizing attenuation correction with concurrent axial CT and PET/CT  image fusion. Dose reduction techniques were used. COMPARISON: PET CT from 03/13/2017 FINDINGS: HEAD AND NECK: Mild generalized cerebral and cerebellar volume loss, likely age-related. Mild mucosal thickening right maxillary sinus. CHEST: Markedly FDG avid right pleural nodules have developed with a small FDG avid right pleural effusion. A 1.4 x 0.9 cm moderately FDG  avid soft tissue nodule has developed along the upper left mediastinum at the interface with the left pleura. Calcified atherosclerosis, including marked disease in the left anterior descending coronary artery. Decreased attenuation of the blood pool relative to myocardium, suggesting anemia. ABDOMEN/PELVIS: No abnormal FDG activity. Atrophic right kidney. Marked calcified atherosclerosis. Pelvic phleboliths. MUSCULOSKELETAL: Moderate bilateral shoulder synovitis. Mild degenerative change in the spine.     CONCLUSION: Progressive disease          Signed by: Jemal Evans MD

## 2021-06-10 NOTE — PROGRESS NOTES
"ACUPUNCTURIST TREATMENT NOTE    Name: Blanca Oreilly  :  1933  MRN:  [id number]    Acupuncture Treatment  Patient Type: WW CCC  Intervention Reason: Fatigue, Neuropathy, Neuropathy 2  Pre-session Neuropathy ratin  Post-session Neuropathy ratin  Pre-session Neuropathy 2 rating: 10  Post-session Neuropathy 2 rating: 10  Patient complaint:: fatigue, weakness, neuropathy in hands, face, and feet  Acupunture (Points):: Ht 7, Rickey 6, St 25, Rickey 12, St 36, Maida 8, Sp 6, Maida 3, Ki 3  Practitioner Observed: 30 minute treatment  Checklist: Progress Note Completed, Consent Reveiwed    \"Risks and benefits of acupuncture were discussed with patient. Consent for treatment was given. We thank you for the referral.\"     Mirian Dawson L.Ac.    Date:  2017  Time:  3:11 PM    "

## 2021-06-10 NOTE — PROGRESS NOTES
"ACUPUNCTURIST TREATMENT NOTE    Name: Blanca Oreilly  :  1933  MRN:  [id number]    Acupuncture Treatment  Patient Type: WW CCC  Intervention Reason: Neuropathy, Neuropathy 2  Pre-session Neuropathy ratin  Post-session Neuropathy ratin  Pre-session Neuropathy 2 ratin  Post-session Neuropathy 2 ratin  Patient complaint:: neuropathy in fingers and feet; weakness, dizziness  Acupunture (Points):: Ht 7, LI 4, St 36, Ki 7, Ki 3, Maida 3, Sp 3, Ba Kranthi  Practitioner Observed: 30 minute treatment  Checklist: Progress Note Completed, Consent Reveiwed    \"Risks and benefits of acupuncture were discussed with patient. Consent for treatment was given. We thank you for the referral.\"     Mirian Dawson L.Ac.    Date:  2017  Time:  3:22 PM    "

## 2021-06-10 NOTE — PROGRESS NOTES
"ACUPUNCTURIST TREATMENT NOTE    Name: Blanca Oreilly  :  1933  MRN:  [id number]    Acupuncture Treatment  Patient Type: WW CCC  Intervention Reason: Fatigue, Neuropathy, Neuropathy 2  Pre-session Neuropathy ratin  Post-session Neuropathy ratin  Pre-session Neuropathy 2 ratin  Post-session Neuropathy 2 ratin  Pre Fatigue: 5  Post Fatigue: 7  Patient complaint:: fatigue, neuropathy in hands and feet  Acupunture (Points):: Pc 6, LI 4, Rickey 6, St 36, Sp 9, Ki 7, Maida 3, Ba Kranthi, tibialis anterior MP, peroneous longus MP  Practitioner Observed: 30 minute treatment  Checklist: Progress Note Completed, Consent Reveiwed    \"Risks and benefits of acupuncture were discussed with patient. Consent for treatment was given. We thank you for the referral.\"     Mirian Dawson L.Ac.    Date:  2017  Time:  2:36 PM    "

## 2021-06-10 NOTE — PROGRESS NOTES
"ACUPUNCTURIST TREATMENT NOTE    Name: Blanca Oreilly  :  1933  MRN:  [id number]    Acupuncture Treatment  Patient Type: WW CCC  Intervention Reason: Fatigue, Neuropathy, Neuropathy 2  Pre-session Neuropathy ratin  Post-session Neuropathy ratin  Pre-session Neuropathy 2 rating: 10  Post-session Neuropathy 2 rating: 10  Patient complaint:: fatigue, weakness, neuropathy  Acupunture (Points):: LI 11, Pc 6, LI 4, St 36, Sp 9, Sp 6, Ki 7, Maida 3, Sp 3, Ki 1  Practitioner Observed: 30 minute treatment  Checklist: Progress Note Completed, Consent Reveiwed    \"Risks and benefits of acupuncture were discussed with patient. Consent for treatment was given. We thank you for the referral.\"     Mirian Dawson L.Ac.    Date:  2017  Time:  2:43 PM    "

## 2021-06-10 NOTE — PROGRESS NOTES
Pt here for tecentriq infusion. Reviewed this medication with  Pt and reviewed possible side effects. Iv started with brisk blood return and tecentriq first dose infused over one hour. PT tolerated infusion without any side effects. Tubing flushed with ns upon completion of infusion/iv dc'd with pressure dressing to site. PT requests vitamin b 12 inj today and given with bandaid over site. Follow up reviewed and pt dc'd steady gait with daughter.

## 2021-06-10 NOTE — PROGRESS NOTES
"ACUPUNCTURIST TREATMENT NOTE    Name: Blanca Oreilly  :  1933  MRN:  [id number]    Acupuncture Treatment  Patient Type: WW CCC  Intervention Reason: Fatigue, Neuropathy, Neuropathy 2, Other  Pre-session Neuropathy ratin  Post-session Neuropathy ratin  Pre-session Neuropathy 2 rating: 10  Post-session Neuropathy 2 rating: 10  Patient complaint:: fatigue and weakness  Acupunture (Points):: Rickey 6, Rickey 12, St 25, Pc 6, St 36, Ki 7, Sp 6, Maida 3, Ki 3, Sp 3  Practitioner Observed: 30 minute treatment  Checklist: Progress Note Completed, Consent Reveiwed    \"Risks and benefits of acupuncture were discussed with patient. Consent for treatment was given. We thank you for the referral.\"     Mirian Dawson L.Ac.    Date:  2017  Time:  2:46 PM    "

## 2021-06-10 NOTE — PROGRESS NOTES
[facility] Hematology and Oncology Progress Note    Patient: Blanca Oreilly  MRN: [id number]  Date of Service:            Reason for visit      1. Malignant neoplasm of upper lobe bronchus, right    2. Chemotherapy-induced peripheral neuropathy    NSCLC; EGFR and ALK-1 negative. PD-L1 strongly positive for both 22C3, 28-8    Assessment     1. Very pleasant 83 y.o. woman with stage IV NSCLC, adenocarcinoma. Negative for EGFR  And ALK-1. We also checked PD-L1 and she is strongly positive.  2. Initially had 4 cycles of carboplatin, taxol and avastin. Then was on Alimta and Avastin.  Her CT scan showed slight increased precrinal and subcrinal nodes.  PET scan also confirmed progression. She has had 4 cycles Carboplatin and Taxol. PET scan showed good response.  Started on Pembrolizumab but after 4 cycles there was some progression.  She then tried and progressed on Opdivo.  Currently she is on Abraxane since November 2016. PET scan is showing complete response.  3. Gastric ulcer pathology is consistent with non-small cell lung cancer.  Clinically she seems to be doing well on that her hemoglobin is getting better and she has not had any more blood in her stools.  4. Some neurologic changes in the form of speech and some walking issues. Some could be from her peripheral neuropathy however I am worried that this might be from some other central nervous system reasons as well.  5. 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  6. Anxiety and depression.  7. .Renal failure for which she was on prednisone and it is getting stable.  8. Positive stress test for which now she is on nitrates.    Plan     1. Hold Abraxane.  MRI brain to look for any mets to explain her speech issue.  2. RTC in 5 weeks.  3. Continue insulin as directed to manage her blood sugars.  4. Continue with her her heart medications as well.  She will eventually need an EGD and also to check on the gastric ulcer.    Clinical stage      Lung cancer,  Right side    Primary site: Lung (Right)    Staging method: AJCC 7th Edition    Clinical: Stage IV (T4, N0, M1a) signed by Jemal Evans MD on 10/20/2014  2:19 PM    Summary: Stage IV (T4, N0, M1a)      History     Blanca Oreilly is a very pleasant 83 y.o. old female with a history of  non-small cell lung cancer located in the right lung measuring 5.7 cm in size presenting with some shortness of breath associated with malignant pleural effusion in the month of October 2014.  Pleural tap was positive for malignant cells adenocarcinoma in nature.  CK 7 positive CK 20 negative TTF-1 positive.  Subsequently that she was admitted due to worsening shortness of breath and her pleural effusion was tapped . She had Pleurx catheter placed but then removed as pleural fluid has reduced to few cc per week.   Her PET scan done at [facility] showed disease limited to thorax only. She has started on chemotherapy with carboplatin and Taxol with avastin. Has had 4 doses. Her PET scan showed nearly complete response. So we started her on Alimta and Avastin. She started that in February 2015.    She had CT in September 2015 which showed some worsening of subcrinal LN. Then had pet scan. That shows progression. Started back on Carboplatin and Taxol. We did give her Avastin but then we had to hold it due to protienuria. CT scan after 2 cycles showed improvement. PET scan after 4 showed partial response. She was tested for PD-L1 and was positive for both opdivo and Pembrolizumab. So we started on Pembrolizumab in late January 2016.  However PET scan after 3 cycles showed progression. So in April we started on Opdivo.    Was in hospital in the beginning of September 2016 with GI bleed and was found to have gastric ulcer.  She was started on PPI. Coumadin stopped.  In October 2016 her PET scan  showed progression.  Opdivo has been stopped.  Her PET scan also showed some fullness on her kidney.  Her creatinine had gone up.  She was then  admitted and started on some IV fluids and seen by nephrology.  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  High doses of steroids actually give her diabetes.  In the meantime she also lost her  was battling myelodysplastic syndrome.  She had a follow-up endoscopy for the gastric ulcer and that came back positive for adenocarcinoma consistent with lung primary.  She has been started on salvage chemotherapy with single agent Abraxane.  She had 1 cycle in November.  She is here for her next cycle.    She is managing her diabetes well.  Dr. Mayo started her on insulin.  Her blood sugars are getting better.    Tolerating abraxane well. Blood sugars ok.  The scan after 3 cycles showed significant improvement in the mediastinal as well as abdominal lymphadenopathy.  She is not having any more GI bleeding.  Her hemoglobin is going up.  She did have an abnormal stress test for which she ended up being in the hospital.  A reversible ischemic defect was noted.  She has been put on nitrates.  She is otherwise doing well.  She has not had any EGD since starting her chemotherapy.  She had PET scan in March which actually showed complete response.    She continued Abraxane after the PET scan as well.  However her neuropathy seems to have gotten worse.  Comes in today for follow-up.  She appears more weak as well as having more difficulty walking as far as balance is concerned.       Past Medical History     Past Medical History:   Diagnosis Date     A-fib      Anemia      Cancer of lung      Carotid stenosis      Chronic kidney disease      CKD (chronic kidney disease)      Coronary artery disease      Diabetes mellitus 11/1/2016     Diabetes mellitus, type II      Exacerbation of asthma 4/4/2007     High cholesterol      HTN (hypertension)      Hyperlipidemia      Hypertension      Peripheral neuropathy      Pleural  "effusion      Primary lung adenocarcinoma     Stage IV NSCLC     Upper GI bleed      Review of Systems   Constitutional  Constitutional (WDL): Exceptions to WDL  Fatigue: Fatigue relieved by rest  Fever: None  Chills: None  Weight Gain: None  Weight Loss: to <10% from baseline, intervention not indicated (1# since 4/13)Denies any complaints.  Neurosensory  Neurosensory (WDL): Exceptions to WDL  Peripheral Motor Neuropathy: Asymptomatic, clinical or diagnostic observations only, intervention not indicated  Ataxia: Moderate symptoms, limiting instrumental ADL  Peripheral Sensory Neuropathy: Moderate symptoms, limiting instrumental ADL (bilat fingers, feet, face/accupuncture today-increased 2  times per week)  Confusion: None  Syncope: None  Cardiovascular  Cardiovascular (WDL): All cardiovascular elements are within defined limits  Pulmonary  Cough: None  Dyspnea: Shortness of breath with moderate exertion (\"all the time\" increases with exertion)  Hypoxia: None  Gastrointestinal  Gastrointestinal (WDL): Exceptions to WDL  Anorexia: Loss of appetite without alteration in eating habits  Constipation: Occasional or intermittent symptoms, occasional use of stool softeners, laxatives, dietary modification, or enema  Diarrhea: Increase of <4 stools per day over baseline, mild increase in ostomy output compared to baseline  Dysphagia: None  Esophagitis: None  Nausea: None  Pharyngitis: None  Vomiting: None  Dysgeusia: None  Dry Mouth: None  Genitourinary  Genitourinary (WDL): Exceptions to WDL  Urinary Frequency: Limiting instrumental ADL, medical management indicated  Urinary Retention: None  Urinary Tract Pain: None  Integumentary  Integumentary (WDL): Exceptions to WDL  Alopecia: Hair loss of >50% normal for that individual that is readily apparent to others, a wig or hair piece is necessary if the patient desires to completely camouflage the hair loss, associated with psychosocial impact  Rash Maculo-Papular: " None  Pruritus: None  Urticaria: None  Palmar-Plantar Erythrodysesthesia Syndrome: None  Patient Coping  Patient Coping: Accepting  ECOG Performance   1  Pain Status  Currently in Pain: No/denies  Accompanied by  Accompanied by: Family Member      Vital Signs     Vitals:    04/27/17 0903   BP: (!) 196/79   Pulse: (!) 58   Temp: 97.6  F (36.4  C)   SpO2: 97%       Physical Exam     GENERAL: no acute distress. Cooperative in conversation.   HEENT: Starting to have alopecia. pupils are equal, round and reactive. Oral mucosa is clean and intact. No ulcerations or mucositis noted. No bleeding noted.  RESP:Chest symmetric lungs are clear bilaterally per auscultation. Regular respiratory rate. No wheezes or rhonchi.    CV: Normal S1 S2 Regular, rate and rhythm. No murmurs.  ABD: Nondistended, soft, slight epigastric tenderness. Positive bowel sounds. No organomegaly.   EXTREMITIES: No lower extremity edema.  Noticing slight increased edema around the lower part of her ankles.  NEURO: non focal. Alert and oriented x3. Slight weakness on the right side of her face.  She has more trouble walking.  Finger-nose test was actually okay otherwise.  PSYCH: within normal limits. No depression or anxiety.  SKIN: warm dry intact .      Lab Results     Results for orders placed or performed in visit on 04/27/17   Comprehensive Metabolic Panel   Result Value Ref Range    Sodium 141 136 - 145 mmol/L    Potassium 4.2 3.5 - 5.0 mmol/L    Chloride 106 98 - 107 mmol/L    CO2 30 22 - 31 mmol/L    Anion Gap, Calculation 5 5 - 18 mmol/L    Glucose 177 (H) 70 - 125 mg/dL    BUN 24 8 - 28 mg/dL    Creatinine 1.25 (H) 0.60 - 1.10 mg/dL    GFR MDRD Af Amer 50 (L) >60 mL/min/1.73m2    GFR MDRD Non Af Amer 41 (L) >60 mL/min/1.73m2    Bilirubin, Total 0.6 0.0 - 1.0 mg/dL    Calcium 10.0 8.5 - 10.5 mg/dL    Protein, Total 6.4 6.0 - 8.0 g/dL    Albumin 3.7 3.5 - 5.0 g/dL    Alkaline Phosphatase 77 45 - 120 U/L    AST 16 0 - 40 U/L    ALT 18 0 - 45 U/L    HM1 (CBC with Diff)   Result Value Ref Range    WBC 8.6 4.0 - 11.0 thou/uL    RBC 3.69 (L) 3.80 - 5.40 mill/uL    Hemoglobin 10.7 (L) 12.0 - 16.0 g/dL    Hematocrit 33.9 (L) 35.0 - 47.0 %    MCV 92 80 - 100 fL    MCH 29.0 27.0 - 34.0 pg    MCHC 31.6 (L) 32.0 - 36.0 g/dL    RDW 17.3 (H) 11.0 - 14.5 %    Platelets 208 140 - 440 thou/uL    MPV 9.7 8.5 - 12.5 fL    Neutrophils % 78 (H) 50 - 70 %    Lymphocytes % 10 (L) 20 - 40 %    Monocytes % 10 2 - 10 %    Eosinophils % 2 0 - 6 %    Basophils % 1 0 - 2 %    Neutrophils Absolute 6.7 2.0 - 7.7 thou/uL    Lymphocytes Absolute 0.8 0.8 - 4.4 thou/uL    Monocytes Absolute 0.8 0.0 - 0.9 thou/uL    Eosinophils Absolute 0.2 0.0 - 0.4 thou/uL    Basophils Absolute 0.1 0.0 - 0.2 thou/uL         Distress assessment and ECOG status      ECOG Performance:    ECOG Performance Status: 1    Distress Assessment  Distress Assessment Score: 9 (condition of my eyes, can't read, can't walk)       Imaging Results       No results found.        Signed by: Jemal Evans MD

## 2021-06-10 NOTE — PROGRESS NOTES
"ACUPUNCTURIST TREATMENT NOTE    Name: Blanca Oreilly  :  1933  MRN:  [id number]    Acupuncture Treatment  Patient Type: WW CCC  Intervention Reason: Fatigue, Neuropathy, Neuropathy 2  Pre-session Neuropathy ratin  Post-session Neuropathy ratin  Pre-session Neuropathy 2 ratin  Post-session Neuropathy 2 ratin  Pre Fatigue: 5  Post Fatigue: 6  Patient complaint:: fatigue; neuropathy in hands and feet  Acupunture (Points):: Du 20, An Clarence, Ht 7, St 36, Maida 8, Sp 9, Ki 7, Ki 3, Maida 3, Sp 3  Practitioner Observed: 30 minute treatment  Checklist: Progress Note Completed, Consent Reveiwed    \"Risks and benefits of acupuncture were discussed with patient. Consent for treatment was given. We thank you for the referral.\"     Mirian Dawson L.Ac.    Date:  2017  Time:  3:54 PM    "

## 2021-06-10 NOTE — PROGRESS NOTES
"ACUPUNCTURIST TREATMENT NOTE    Name: Blanca Oreilly  :  1933  MRN:  [id number]    Acupuncture Treatment  Patient Type: WW CCC  Intervention Reason: Anxiety/Stress  Pre-session Stress/Anxiety ratin  Post-session Stress/Anxiety ratin  Pre-session Neuropathy ratin  Post-session Neuropathy ratin  Pre-session Neuropathy 2 ratin  Post-session Neuropathy 2 ratin  Pre-session Other ratin  Post-session Other ratin  Pre Fatigue: 5  Post Fatigue: 7  Patient complaint:: fatigue; anxiety, neuropathy in fingers and feet  Acupunture (Points):: Du 20, SSC, LI 11, Ht 7, St 36, Sp 6, Ki 3, Maida 3  Practitioner Observed: 30 minute treatment  Checklist: Progress Note Completed, Consent Reveiwed  Follow up comments: Neuropathy in head and face are resolved; watery eyes is improved; Pt. is very worried about dental procedure scheduled for following day    \"Risks and benefits of acupuncture were discussed with patient. Consent for treatment was given. We thank you for the referral.\"     Mirian Dawson L.Ac.    Date:  5/15/2017  Time:  2:35 PM    "

## 2021-06-10 NOTE — PROGRESS NOTES
"ACUPUNCTURIST TREATMENT NOTE    Name: Blanca Oreilly  :  1933  MRN:  [id number]    Acupuncture Treatment  Patient Type: WW CCC  Intervention Reason: Fatigue, Neuropathy, Neuropathy 2  Pre-session Stress/Anxiety ratin  Post-session Stress/Anxiety ratin  Pre-session Neuropathy ratin  Post-session Neuropathy ratin  Pre-session Neuropathy 2 ratin  Post-session Neuropathy 2 ratin  Pre Fatigue: 4  Post Fatigue: 7  Patient complaint:: fatigue, neuropathy in hands and feet  Acupunture (Points):: Rickey 6, LI 11, LI 4, St 36, Sp 9, Sp 6, Maida 3, Ki 3, Sp 3  Practitioner Observed: 30 minute treatment  Checklist: Progress Note Completed, Consent Reveiwed  Follow up comments: Pt. reported dental procedure was uneventful; Pt. daughter reported that Pt blood pressure remained steady during dental procedure    \"Risks and benefits of acupuncture were discussed with patient. Consent for treatment was given. We thank you for the referral.\"     Mirian Dawson L.Ac.    Date:  2017  Time:  2:27 PM    "

## 2021-06-11 NOTE — PROGRESS NOTES
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    PLAN: Toenails were debrided manually and mechanically x10. Return to clinic in nine weeks.         SUBJECTIVE: The patient returns to the [facility] for help with her toenails which are long, thick and painful. She has altered sensation in fingers and toes following chemotherapy treatments for cancer.  She is also diabetic. She has not seen bleeding or drainage. She denies any foot trauma.  Last clinic visit was May 1, 2017.    OBJECTIVE:  General: Pleasant 83 y.o. female in no acute distress.  Vascular: DP pulses are palpable. PT pulses are palpable. Pedal hair is diminished. Feet are cool to the touch.  Neuro: Sensation in the feet is altered. Patient describes paresthesias.  Derm: Toenails are elongated, thickened and dystrophic with discoloration and subungual debris. Skin is thin and shiny but intact.   Musculoskeletal: Hammertoes.

## 2021-06-11 NOTE — PROGRESS NOTES
"ACUPUNCTURIST TREATMENT NOTE    Name: Blanca Oreilly  :  1933  MRN:  [id number]    Acupuncture Treatment  Patient Type: WW CCC  Intervention Reason: Fatigue, Neuropathy, Neuropathy 2  Pre-session Stress/Anxiety ratin  Post-session Stress/Anxiety ratin  Pre-session Neuropathy ratin  Post-session Neuropathy ratin  Pre-session Neuropathy 2 ratin  Post-session Neuropathy 2 ratin  Pre Fatigue: 4  Post Fatigue: 7  Patient complaint:: fatigue; fluid in lungs, neuropathy in hands and feet  Acupuncture (Points):: Du 20, Rickey 17, Gena 1, TW 5, LI 4, Gena 7, Sp 9, ST 36, ST 40, Ki 3, Maida 3  Practitioner Observed: 30 minute treatment  Checklist: Progress Note Completed, Consent Reveiwed    \"Risks and benefits of acupuncture were discussed with patient. Consent for treatment was given. We thank you for the referral.\"     Mirian Dawson L.Ac.    Date:  2017  Time:  3:34 PM    "

## 2021-06-11 NOTE — PROGRESS NOTES
"ACUPUNCTURIST TREATMENT NOTE    Name: Blanca Oreilly  :  1933  MRN:  [id number]    Acupuncture Treatment  Patient Type: WW CCC  Intervention Reason: Neuropathy, Neuropathy 2  Pre-session Neuropathy ratin  Post-session Neuropathy ratin  Pre-session Neuropathy 2 ratin  Post-session Neuropathy 2 rating: 3  Patient complaint:: Neuropathy of hands and feet  Acupuncture (Points):: Lio schultz, Ba lois, Tb 5, St 36, 40, SP 3, 6, Gb 41  Practitioner Observed: 30 minute treatment.  Checklist: Progress Note Completed, Consent Reveiwed    \"Risks and benefits of acupuncture were discussed with patient. Consent for treatment was given. We thank you for the referral.\"     Shar Sandoval    Date:  2017  Time:  1:36 PM    "

## 2021-06-11 NOTE — PROGRESS NOTES
Pt here for tecentriq after seeing NP.  Pt denies new complaint and when infusion complete IV removed and pt d/c to lobby with her son-in-law.  Pt aware of treatment plan

## 2021-06-11 NOTE — PROGRESS NOTES
"ACUPUNCTURIST TREATMENT NOTE    Name: Blanca Oreilly  :  1933  MRN:  [id number]    Acupuncture Treatment  Patient Type: WW CCC  Intervention Reason: Insomnia, Neuropathy, Neuropathy 2  Pre-session Neuropathy ratin  Post-session Neuropathy ratin  Pre-session Neuropathy 2 rating: 3  Post-session Neuropathy 2 rating: 3  Patient complaint:: insomnia; neuropathy in fingers and feet  Acupuncture (Points):: Du 20, An Main, Ht 7, St 36, Maida 8, Sp 6, Ki 3, Maida 3, Ba Kranthi  Practitioner Observed: 30 minute treatment  Checklist: Progress Note Completed, Consent Reveiwed    \"Risks and benefits of acupuncture were discussed with patient. Consent for treatment was given. We thank you for the referral.\"     Mirian Dawson L.Ac.    Date:  2017  Time:  3:39 PM    "

## 2021-06-11 NOTE — PROGRESS NOTES
"ACUPUNCTURIST TREATMENT NOTE    Name: Blanca Oreilly  :  1933  MRN:  [id number]    Acupuncture Treatment  Patient Type: WW CCC  Intervention Reason: Neuropathy, Neuropathy 2, Other  Pre-session Neuropathy ratin  Post-session Neuropathy ratin  Pre-session Neuropathy 2 ratin  Post-session Neuropathy 2 rating: 3  Patient complaint:: dyspnea; neuropathy in hands and feet  Acupuncture (Points):: Gena 7, LI 4, TW 5, Gena 1, Rickey 17, St 36, Sp 9, ST 40, Ki 6, Sp 3, Maida 3  Practitioner Observed: 30 minute treatment  Checklist: Progress Note Completed, Consent Reveiwed    \"Risks and benefits of acupuncture were discussed with patient. Consent for treatment was given. We thank you for the referral.\"     Mirian Dawson L.Ac.    Date:  2017  Time:  10:53 AM    "

## 2021-06-11 NOTE — PROGRESS NOTES
"ACUPUNCTURIST TREATMENT NOTE    Name: Blanca Oreilly  :  1933  MRN:  [id number]    Acupuncture Treatment  Patient Type: WW CCC  Intervention Reason: Insomnia, Neuropathy, Neuropathy 2  Pre-session Neuropathy ratin  Post-session Neuropathy ratin  Pre-session Neuropathy 2 ratin  Post-session Neuropathy 2 ratin  Patient complaint:: insomnia; neuropathy in hands and feet  Acupuncture (Points):: Du 20, An Clarence, Yin Bruno, LI 11, TW 6, LI 4, St 36, Sp 6, Ki 3, Maida 3  Practitioner Observed: 30 minute treatment  Checklist: Progress Note Completed, Consent Reveiwed    \"Risks and benefits of acupuncture were discussed with patient. Consent for treatment was given. We thank you for the referral.\"     Mirian Dawson L.Ac.    Date:  2017  Time:  3:08 PM    "

## 2021-06-11 NOTE — PROGRESS NOTES
"ACUPUNCTURIST TREATMENT NOTE    Name: Blanca Oreilly  :  1933  MRN:  [id number]    Acupuncture Treatment  Patient Type: WW CCC  Intervention Reason: Neuropathy, Neuropathy 2, Quality of Life  Pre-session Neuropathy ratin  Post-session Neuropathy ratin  Pre-session Neuropathy 2 ratin  Post-session Neuropathy 2 rating: 3  Patient complaint:: dyspnea, neuropathy in hands and feet  Acupuncture (Points):: Rickey 17, Gena 1, Gena 7-9, LI 4, TW 5, St 36, St 40, Sp 9, Ki 3, Maida 3  Practitioner Observed: 30 minute treatment  Checklist: Progress Note Completed, Consent Reveiwed    \"Risks and benefits of acupuncture were discussed with patient. Consent for treatment was given. We thank you for the referral.\"     Mirian Dawson L.Ac.    Date:  2017  Time:  3:43 PM    "

## 2021-06-11 NOTE — PROGRESS NOTES
[facility] Progress Note    Patient: Blanca Oreilly  MRN: [id number]  Date of Service:         Reason for Visit    Chief Complaint   Patient presents with     HE Cancer     Malignant neoplasm of upper lobe bronchus - follow up       Assessment and Plan  Malignant neoplasm of upper lobe bronchus, right    Staging form: Lung, AJCC 7th Edition      Clinical: Stage IV (T4, N0, M1a) - Signed by Jemal Evans MD on 10/20/2014        Prognostic indicators: EGFR and ALK negative        Pathologic: No stage assigned - Unsigned        Prognostic indicators: EGFR and ALK negative     1.  This is a pleasant 83-year-old woman with stage IV non-small cell lung cancer, adenocarcinoma.  She was negative for EGFR and ALK mutations and positive with PDL 1.  She recently had progression so is on Tecentriq. We will give cycle 2 today.     2. Anemia: chemo induced and usually cumulative. Overall asymptomatic except fatigue. Continue to monitor.     3.  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 Acupuncture is helping. Continue to monitor.     4.  Chronic renal insufficiency: This is near her baseline.  We will continue to monitor it is slightly better today.  Encourage good hydration.      ECOG Performance   ECOG Performance Status: 1     Distress Assessment  Distress Assessment Score: No distress    Pain  Currently in Pain: No/denies      Problem List    1. Chronic Renal Insufficiency     2. Malignant neoplasm of upper lobe bronchus, right  CC OFFICE VISIT LONG    Infusion Appointment    CC OFFICE VISIT LONG   3. Antineoplastic chemotherapy induced anemia           ______________________________________________________________________________    History of Present Illness    Blanca Oreilly is a very pleasant 83 y.o. old female with a history of non-small cell lung cancer located in the right lung measuring 5.7 cm in size presenting with some shortness of breath associated with malignant pleural effusion  in the month of October 2014.  Pleural tap was positive for malignant cells adenocarcinoma in nature.  CK 7 positive CK 20 negative TTF-1 positive.  Subsequently that she was admitted due to worsening shortness of breath and her pleural effusion was tapped . She had Pleurx catheter placed but then removed as pleural fluid has reduced to few cc per week.   Her PET scan done at [facility] showed disease limited to thorax only. She has started on chemotherapy with carboplatin and Taxol with avastin. Has had 4 doses. Her PET scan showed nearly complete response. So we started her on Alimta and Avastin. She started that in February 2015.     She had CT in September 2015 which showed some worsening of subcrinal LN. Then had pet scan. That shows progression. Started back on Carboplatin and Taxol. We did give her Avastin but then we had to hold it due to protienuria. CT scan after 2 cycles showed improvement. PET scan after 4 showed partial response. She was tested for PD-L1 and was positive for both opdivo and Pembrolizumab. So we started on Pembrolizumab in late January 2016. However PET scan after 3 cycles showed progression. So in April we started on Opdivo.  Was in hospital in the beginning of September 2016 with GI bleed and was found to have gastric ulcer. She was started on PPI. Coumadin stopped. In October 2016 her PET scan showed progression. Opdivo has been stopped. Her PET scan also showed some fullness on her kidney. Her creatinine had gone up. She was then admitted and started on some IV fluids and seen by nephrology. 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 High doses of steroids actually give her diabetes. In the meantime she also lost her  was battling myelodysplastic syndrome. She had a follow-up endoscopy for the gastric ulcer and that came back positive for adenocarcinoma consistent with lung primary.  She has been started on salvage chemotherapy with single agent Abraxane. She had 1 cycle in November.   Her last PET scan in March showed a complete response. She continued on abraxane, but had worsening neuropathy so she stopped treatment in April. In may, her PET scan showed progression. She has started on Tecentriq. Here for cycle 2.     Pain Status  Currently in Pain: No/denies    Review of Systems    Constitutional  Constitutional (WDL): Exceptions to WDL  Fatigue: Fatigue relieved by rest  Weight Loss: to <10% from baseline, intervention not indicated (Lost 3 lbs since last office visit. )  Neurosensory  Neurosensory (WDL): Exceptions to WDL (Acupuncture has helped. )  Peripheral Motor Neuropathy: Asymptomatic, clinical or diagnostic observations only, intervention not indicated  Ataxia: Asymptomatic, clinical or diagnostic observations only, intervention not indicated  Peripheral Sensory Neuropathy: Asymptomatic, loss of deep tendon reflexes or paresthesia  Eye   Eye Disorder (WDL): Exceptions to WDL  Blurred Vision: Intervention not indicated  Watering Eyes: Intervention not indicated  Ear  Ear Disorder (WDL): All ear disorder elements are within defined limits  Cardiovascular  Cardiovascular (WDL): All cardiovascular elements are within defined limits  Pulmonary  Respiratory (WDL): Exceptions to WDL  Cough: Mild symptoms, nonprescription intervention indicated  Dyspnea: Shortness of breath with moderate exertion  Gastrointestinal  Gastrointestinal (WDL): Exceptions to WDL  Anorexia: Loss of appetite without alteration in eating habits  Diarrhea: Increase of <4 stools per day over baseline, mild increase in ostomy output compared to baseline  Genitourinary  Genitourinary (WDL): Exceptions to WDL  Urinary Frequency: Present  Lymphatic  Lymph (WDL): All lymph disorder elements are within defined limits  Musculoskeletal and Connective Tissue  Musculoskeletal and Connetive Tissue Disorders (WDL): Exceptions to  WDL  Myalgia: Mild pain  Integumentary  Integumentary (WDL): Exceptions to WDL  Alopecia: Hair loss of >50% normal for that individual that is readily apparent to others, a wig or hair piece is necessary if the patient desires to completely camouflage the hair loss, associated with psychosocial impact  Patient Coping  Patient Coping: Accepting  Distress Assessment  Distress Assessment Score: No distress  Accompanied by  Accompanied by: Family Member (Son-in-law in the lobby.)    Past History  Past Medical History:   Diagnosis Date     A-fib      Anemia      Cancer of lung      Carotid stenosis      Chronic kidney disease      CKD (chronic kidney disease)      Coronary artery disease      Diabetes mellitus 11/1/2016     Diabetes mellitus, type II      Exacerbation of asthma 4/4/2007     High cholesterol      HTN (hypertension)      Hyperlipidemia      Hypertension      Peripheral neuropathy      Pleural effusion      Primary lung adenocarcinoma     Stage IV NSCLC     Upper GI bleed        PHYSICAL EXAM:  BP (!) 213/86 Comment: Recheck  Pulse 66  Temp 97.5  F (36.4  C) (Oral)   Wt 114 lb 1.6 oz (51.8 kg)  SpO2 99%  BMI 21.56 kg/m2  GENERAL: no acute distress. Cooperative in conversation. Here alone today  HEENT: pupils are equal, round and reactive. Oromucosa is clean and intact. No ulcerations or mucositis noted. No bleeding noted.  RESP: lungs are clear bilaterally per auscultation. Regular respiratory rate. No wheezes or rhonchi.  CV: Regular, rate and rhythm. No murmurs.  ABD: soft, nontender. Positive bowel sounds. No organomegaly.   MUSCULOSKELETAL: No lower extremity swelling.   NEURO: non focal. Alert and oriented x3.  Altered sensation in her feet, but she is doing better.   PSYCH: within normal limits. No depression or anxiety.  SKIN: warm dry intact   LYMPH: no cervical, supraclavicular lymphadenopathy      Lab Results    Recent Results (from the past 168 hour(s))   Comprehensive Metabolic Panel    Result Value Ref Range    Sodium 144 136 - 145 mmol/L    Potassium 4.0 3.5 - 5.0 mmol/L    Chloride 105 98 - 107 mmol/L    CO2 30 22 - 31 mmol/L    Anion Gap, Calculation 9 5 - 18 mmol/L    Glucose 116 70 - 125 mg/dL    BUN 27 8 - 28 mg/dL    Creatinine 1.20 (H) 0.60 - 1.10 mg/dL    GFR MDRD Af Amer 52 (L) >60 mL/min/1.73m2    GFR MDRD Non Af Amer 43 (L) >60 mL/min/1.73m2    Bilirubin, Total 0.6 0.0 - 1.0 mg/dL    Calcium 10.0 8.5 - 10.5 mg/dL    Protein, Total 6.8 6.0 - 8.0 g/dL    Albumin 3.7 3.5 - 5.0 g/dL    Alkaline Phosphatase 93 45 - 120 U/L    AST 20 0 - 40 U/L    ALT 19 0 - 45 U/L   HM1 (CBC with Diff)   Result Value Ref Range    WBC 11.0 4.0 - 11.0 thou/uL    RBC 3.84 3.80 - 5.40 mill/uL    Hemoglobin 11.2 (L) 12.0 - 16.0 g/dL    Hematocrit 35.2 35.0 - 47.0 %    MCV 92 80 - 100 fL    MCH 29.2 27.0 - 34.0 pg    MCHC 31.8 (L) 32.0 - 36.0 g/dL    RDW 15.5 (H) 11.0 - 14.5 %    Platelets 250 140 - 440 thou/uL    MPV 9.5 8.5 - 12.5 fL    Neutrophils % 78 (H) 50 - 70 %    Lymphocytes % 10 (L) 20 - 40 %    Monocytes % 8 2 - 10 %    Eosinophils % 4 0 - 6 %    Basophils % 1 0 - 2 %    Neutrophils Absolute 8.5 (H) 2.0 - 7.7 thou/uL    Lymphocytes Absolute 1.1 0.8 - 4.4 thou/uL    Monocytes Absolute 0.9 0.0 - 0.9 thou/uL    Eosinophils Absolute 0.4 0.0 - 0.4 thou/uL    Basophils Absolute 0.1 0.0 - 0.2 thou/uL       Imaging    Nm Pet Ct Skull To Mid Thigh    Result Date: 5/22/2017  PET FDG/CT 5/22/2017 9:50 AM INDICATION: Right lung cancer restaging, subsequent treatment strategy TECHNIQUE: Serum glucose level 137 mg/dL. One hour post right antecubital intravenous administration of 7.1 mCi F-18 FDG, PET imaging was performed from the skull base to the mid thighs utilizing attenuation correction with concurrent axial CT and PET/CT  image fusion. Dose reduction techniques were used. COMPARISON: PET CT from 03/13/2017 FINDINGS: HEAD AND NECK: Mild generalized cerebral and cerebellar volume loss, likely age-related. Mild  mucosal thickening right maxillary sinus. CHEST: Markedly FDG avid right pleural nodules have developed with a small FDG avid right pleural effusion. A 1.4 x 0.9 cm moderately FDG avid soft tissue nodule has developed along the upper left mediastinum at the interface with the left pleura. Calcified atherosclerosis, including marked disease in the left anterior descending coronary artery. Decreased attenuation of the blood pool relative to myocardium, suggesting anemia. ABDOMEN/PELVIS: No abnormal FDG activity. Atrophic right kidney. Marked calcified atherosclerosis. Pelvic phleboliths. MUSCULOSKELETAL: Moderate bilateral shoulder synovitis. Mild degenerative change in the spine.     CONCLUSION: Progressive disease        Signed by: Kay Kim, CNP

## 2021-06-11 NOTE — PROGRESS NOTES
Day 1 Cycle 3 - [facility] at 10:37 and was seated in chair 5. Reviewed today's lab results and a copy was given to the patient. Discussed plan of care. Placed IV and infused Tecentriq over 30 minutes - flushed the IV line before and after with NS, using a total volume of 250 mls. No ill effects noted from today's infusion. Vitamin B-12 was administered in the right arm, and covered the site with a bandaid. Left unit ambulatory in stable condition at 12:32, and plans to return in 3 weeks on July 27th.    Aurea Galvan RN

## 2021-06-11 NOTE — PROGRESS NOTES
ASSESSMENT:  1.  Labile hypertension:  Blanca reports that blood pressure have been higher lately.  She has restarted a previous prescription for amlodipine taking 2-1/2-5 mg daily.  Systolic blood pressure is still high on the 5 mg.  Dose will be increased further.  2.  Insomnia: This is an ongoing issue.  She prefers to avoid prescription medications.  Melatonin was advised.  3.  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  She is advised to continue Carafate twice daily  4.  Metastatic non-small cell lung cancer:  Now treated with Tecentriq  PLAN:  1.  Increase amlodipine to 5 mg twice daily.  Due to lability of blood pressure she was advised to hold the medication for systolics under 110.  2.  Try melatonin 3 mg for insomnia.  Take 1-2 hours before bedtime rather than immediately at bedtime.  3.  Continue Carafate twice daily.  4.  Clinic follow-up for blood pressure recheck in 6 weeks      Medications Discontinued During This Encounter   Medication Reason     CARAFATE 100 mg/mL suspension Dose adjustment           ASSESSED PROBLEMS:  No diagnosis found.    CHIEF COMPLAINT:  Chief Complaint   Patient presents with     Hypertension       HISTORY OF PRESENT ILLNESS:  Blanca is a 83 y.o. female presenting to the clinic today for hypertension. She is accompanied by her daughter. She has a current diagnosis of malignant neoplasm of upper lobe bronchus and is undergoing immunotherapy with Tecentriq infusions.     Cardiac Issues: She notes that she is getting blood pressure readings in the 170s systolic with the lowest 120. Her blood pressure today is 158/73 and MD recheck is 156/60. She continues on metoprolol 75mg and has recently upped her amlodipine 5mg from a half tablet to a whole. She denies ankle swelling. She has a history of CAD and paroxysmal atrial fibrillation and continues on isosorbide 30mg daily. Dr. Sandoval has told her that her heart is  "stable and she can follow with her PCP from now on.     Insomnia: She reports difficulty sleeping. She has tried various medications to treat without success.     Type 2 Diabetes: She continues on Lantus 8 units daily. She reports her blood sugar this morning was 161. All readings are less than 200.     REVIEW OF SYSTEMS:   She continues on carafate 1mg twice daily. Her appetite is good. She is using acupuncture for neuropathy, which she feels is effective. All other systems are negative.    PFSH:    TOBACCO USE:  History   Smoking Status     Former Smoker     Packs/day: 0.10     Years: 30.00     Quit date: 10/30/1980   Smokeless Tobacco     Never Used     Comment: Not a steady smoker       VITALS:  Vitals:    06/19/17 1540 06/19/17 1605   BP: 158/73 156/60   Patient Site: Left Arm Right Arm   Patient Position: Sitting Sitting   Cuff Size: Adult Regular    Pulse: 64    SpO2: 94%    Weight: 114 lb 11.2 oz (52 kg)    Height: 5' 1\" (1.549 m)      Wt Readings from Last 3 Encounters:   06/19/17 114 lb 11.2 oz (52 kg)   06/15/17 114 lb 1.6 oz (51.8 kg)   05/25/17 117 lb 5 oz (53.2 kg)     Body mass index is 21.67 kg/(m^2).    PHYSICAL EXAM:  Constitutional:   Reveals an alert pleasant pale appearing woman who chemo-induced alopecia. Appears in good spirits. Vitals: per nursing notes.  HEENT:  Ears:  External canals, TMs clear.    Eyes:  No jaundice or conjunctival hyperemia.   Oropharynx:   Mouth and throat clear, no thrush or exudate. No mouth ulcers  Neck:  Supple, no carotid bruits or adenopathy.  Lungs: Clear to A&P without rales or wheezes.  Respiratory effort normal.  Cardiac:   Regular rate and rhythm, normal S1, S2, no murmur.  Abdomen:  Soft, no mass or tenderness.  Extremities:   No edema.     Skin:  Pale but clear.     ADDITIONAL HISTORY SUMMARIZED (2): Reviewed notes of Dr. Evans 5/25/17 and Dr. Sandoval 4/13/17 .  DECISION TO OBTAIN EXTRA INFORMATION (1): MICHELLE for Care Everywhere obtained.   RADIOLOGY TESTS " (1): Reviewed PET CT 5/22/17 and brain MRI of 4/28/17.  LABS (1): Reviewed recent labs.  MEDICINE TESTS (1): None.  INDEPENDENT REVIEW (2 each): None.     The visit lasted a total of 20 minutes face to face with the patient. Over 50% of the time was spent counseling and educating the patient about hypertension.    I, Chauncey Yip, am scribing for and in the presence of, Dr. Garcia.    I, Dr. Garcia, personally performed the services described in this documentation, as scribed by Chauncey Yip in my presence, and it is both accurate and complete.    Dragon dictation was used for this note.  Speech recognition errors are a possibility.    MEDICATIONS:  Current Outpatient Prescriptions   Medication Sig Dispense Refill     aspirin 81 MG EC tablet Take 1 tablet (81 mg total) by mouth daily with breakfast.  0     atorvastatin (LIPITOR) 40 MG tablet Take 1 tablet (40 mg total) by mouth daily. 90 tablet 3     coenzyme Q10 (CO Q-10) 10 mg capsule Take 1 capsule by mouth daily.        isosorbide mononitrate (IMDUR) 30 MG 24 hr tablet Take 1 tablet (30 mg total) by mouth daily. 90 tablet 3     LANTUS SOLOSTAR 100 unit/mL (3 mL) pen Inject 8 Units under the skin bedtime. 11.65 Type 2 with hyperglycemia 3 mL PRN     loperamide (IMODIUM) 2 mg capsule Take 2 mg by mouth 4 (four) times a day as needed for diarrhea.       magnesium 250 mg Tab Take 500 mg by mouth daily.        metoprolol succinate (TOPROL-XL) 50 MG 24 hr tablet Take 1.5 tablets (75 mg total) by mouth bedtime. 90 tablet 3     multivitamin (ONE A DAY) per tablet Take 1 tablet by mouth daily.       nitroglycerin (NITROSTAT) 0.4 MG SL tablet Place 1 tablet (0.4 mg total) under the tongue every 5 (five) minutes as needed for chest pain. 10 tablet 1     ranitidine (ZANTAC) 150 MG tablet TAKE 1 TABLET BY MOUTH TWICE DAILY (Patient taking differently: Take 150 mg by mouth 2 (two) times a day. ) 180 tablet 3     sucralfate (CARAFATE) 1 gram tablet One gm four times daily as  needed for gastritis 120 tablet 5     No current facility-administered medications for this visit.        Total data points: 5

## 2021-06-11 NOTE — PROGRESS NOTES
"ACUPUNCTURIST TREATMENT NOTE    Name: Blanca Oreilly  :  1933  MRN:  [id number]    Acupuncture Treatment  Patient Type: [facility] CCC  Intervention Reason: Neuropathy, Neuropathy 2  Pre-session Neuropathy ratin  Post-session Neuropathy ratin  Pre-session Neuropathy 2 rating: 3  Post-session Neuropathy 2 rating: 3  Patient complaint:: neuropathy in hands and feet  Acupuncture (Points):: Ht 7, Maida 8, St 36, Sp 6, Maida 3, Sp 3, Ki 3  Practitioner Observed: 30 minute treatment  Checklist: Progress Note Completed, Consent Reveiwed    \"Risks and benefits of acupuncture were discussed with patient. Consent for treatment was given. We thank you for the referral.\"     Mirian Dawson L.Ac.    Date:  2017  Time:  3:39 PM    "

## 2021-06-11 NOTE — PROGRESS NOTES
"ACUPUNCTURIST TREATMENT NOTE    Name: Blanca Oreilly  :  1933  MRN:  [id number]    Acupuncture Treatment  Patient Type: [facility] CCC  Intervention Reason: Insomnia, Neuropathy, Neuropathy 2  Pre-session Neuropathy ratin  Post-session Neuropathy ratin  Pre-session Neuropathy 2 rating: 3  Post-session Neuropathy 2 rating: 3  Patient complaint:: insomnia, neuropathy in hands and feet  Acupuncture (Points):: Du 20, Yin Bruno, LI 4, Ht 7, St 36, Sp 6, Ki 3, Maida 3  Practitioner Observed: 30 minute treatment  Checklist: Progress Note Completed, Consent Reveiwed    \"Risks and benefits of acupuncture were discussed with patient. Consent for treatment was given. We thank you for the referral.\"     Mirian Dawson L.Ac.    Date:  2017  Time:  2:20 PM    "

## 2021-06-11 NOTE — PROGRESS NOTES
[facility] Hematology and Oncology Progress Note    Patient: Blanca Oreilly  MRN: [id number]  Date of Service: 7/6/2017           Reason for visit      1. Malignant neoplasm of upper lobe bronchus, right    NSCLC; EGFR and ALK-1 negative. PD-L1 strongly positive for both 22C3, 28-8    Assessment     1. Very pleasant 83 y.o. woman with stage IV NSCLC, adenocarcinoma. Negative for EGFR  And ALK-1. We also checked PD-L1 and she is strongly positive.  2. Initially had 4 cycles of carboplatin, taxol and avastin. Then was on Alimta and Avastin.  Her CT scan showed slight increased precrinal and subcrinal nodes.  PET scan also confirmed progression. She has had 4 cycles Carboplatin and Taxol. PET scan showed good response.  Started on Pembrolizumab but after 4 cycles there was some progression.  She then tried and progressed on Opdivo.  Most Recently she was on Abraxane from November 2016 until April 2017. PET scan showed complete response. Stopped due to side effects. Her May 2017 PET scan showed progression. Now on Atezulizumab since end of May.  3. Gastric ulcer pathology is consistent with non-small cell lung cancer.  Clinically she seems to be doing well on that her hemoglobin is stable and she has not had any more blood in her stools.  4. Improved neurological symtoms.   5. 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  6. Anxiety and depression.  7. Positive stress test for which now she is on nitrates prn.    Plan     1. Continue on Atezulizumab. She can start tomorrow.  2. RTC in 3 weeks.  3. Continue insulin as directed to manage her blood sugars.  4. She will need a PET scan in August to assess response.    Clinical stage      Lung cancer, Right side    Primary site: Lung (Right)    Staging method: AJCC 7th Edition    Clinical: Stage IV (T4, N0, M1a) signed by Jemal Evans MD on 10/20/2014  2:19 PM    Summary: Stage IV (T4, N0, M1a)      History     Blanca Oreilly is a very pleasant 83 y.o. old  female with a history of  non-small cell lung cancer located in the right lung measuring 5.7 cm in size presenting with some shortness of breath associated with malignant pleural effusion in the month of October 2014.  Pleural tap was positive for malignant cells adenocarcinoma in nature.  CK 7 positive CK 20 negative TTF-1 positive.  Subsequently that she was admitted due to worsening shortness of breath and her pleural effusion was tapped . She had Pleurx catheter placed but then removed as pleural fluid has reduced to few cc per week.   Her PET scan done at [facility] showed disease limited to thorax only. She has started on chemotherapy with carboplatin and Taxol with avastin. Has had 4 doses. Her PET scan showed nearly complete response. So we started her on Alimta and Avastin. She started that in February 2015.    She had CT in September 2015 which showed some worsening of subcrinal LN. Then had pet scan. That shows progression. Started back on Carboplatin and Taxol. We did give her Avastin but then we had to hold it due to protienuria. CT scan after 2 cycles showed improvement. PET scan after 4 showed partial response. She was tested for PD-L1 and was positive for both opdivo and Pembrolizumab. So we started on Pembrolizumab in late January 2016.  However PET scan after 3 cycles showed progression. So in April we started on Opdivo.    Was in hospital in the beginning of September 2016 with GI bleed and was found to have gastric ulcer.  She was started on PPI. Coumadin stopped.  In October 2016 her PET scan  showed progression.  Opdivo has been stopped.  Her PET scan also showed some fullness on her kidney.  Her creatinine had gone up.  She was then admitted and started on some IV fluids and seen by nephrology.  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  High doses of steroids actually give her diabetes.  In the  meantime she also lost her  was battling myelodysplastic syndrome.  She had a follow-up endoscopy for the gastric ulcer and that came back positive for adenocarcinoma consistent with lung primary.  She has been started on salvage chemotherapy with single agent Abraxane.  She had 1 cycle in November.  She is here for her next cycle.    She is managing her diabetes well.  Dr. Mayo started her on insulin.  Her blood sugars are getting better.    Tolerating abraxane well. Blood sugars ok.  The scan after 3 cycles showed significant improvement in the mediastinal as well as abdominal lymphadenopathy.  She is not having any more GI bleeding.  Her hemoglobin is going up.  She did have an abnormal stress test for which she ended up being in the hospital.  A reversible ischemic defect was noted.  She has been put on nitrates.  She is otherwise doing well.  She has not had any EGD since starting her chemotherapy.  She had PET scan in March which actually showed complete response.    She continued Abraxane however her neuropathy seems to have gotten worse.  We stopped treatment in April 2017.     PET scan in May 2017 showed progression. I recommended trying Tecentriq which she started end of May. She has been tolerating it well. Comes in for next treatment. Her neurological symptoms are getting better.       Past Medical History     Past Medical History:   Diagnosis Date     A-fib      Anemia      Cancer of lung      Carotid stenosis      Chronic kidney disease      CKD (chronic kidney disease)      Coronary artery disease      Diabetes mellitus 11/1/2016     Diabetes mellitus, type II      Exacerbation of asthma 4/4/2007     High cholesterol      HTN (hypertension)      Hyperlipidemia      Hypertension      Peripheral neuropathy      Pleural effusion      Primary lung adenocarcinoma     Stage IV NSCLC     Upper GI bleed      Review of Systems   Constitutional  Fatigue: Fatigue relieved by rest  Fever: None  Chills:  None  Weight Gain: None  Weight Loss: NoneDenies any complaints.  Neurosensory  Peripheral Motor Neuropathy: Asymptomatic, clinical or diagnostic observations only, intervention not indicated (acupuncture)  Ataxia: Asymptomatic, clinical or diagnostic observations only, intervention not indicated  Peripheral Sensory Neuropathy: Asymptomatic, loss of deep tendon reflexes or paresthesia  Confusion: None  Syncope: None  Cardiovascular  Cardiovascular (WDL): All cardiovascular elements are within defined limits  Pulmonary  Cough: Mild symptoms, nonprescription intervention indicated  Dyspnea: Shortness of breath with moderate exertion  Hypoxia: None  Gastrointestinal  Anorexia: None  Constipation: None  Diarrhea: None  Dysphagia: None  Esophagitis: None  Nausea: None  Pharyngitis: None  Vomiting: None  Dysgeusia: None  Dry Mouth: None  Genitourinary  Genitourinary (WDL): All genitourinary elements are within defined limits  Integumentary  Integumentary (WDL): Exceptions to WDL  Alopecia: Hair loss of >50% normal for that individual that is readily apparent to others, a wig or hair piece is necessary if the patient desires to completely camouflage the hair loss, associated with psychosocial impact  Rash Maculo-Papular: None  Pruritus: None  Urticaria: None  Palmar-Plantar Erythrodysesthesia Syndrome: None  Flushing: None  Patient Coping  Patient Coping: Accepting  ECOG Performance   1  Pain Status  Currently in Pain: No/denies  Accompanied by         Vital Signs     Vitals:    07/06/17 1004   BP: 145/66   Pulse: 63   Temp: 97.5  F (36.4  C)   SpO2: 95%       Physical Exam     GENERAL: no acute distress. Cooperative in conversation.   HEENT: Starting to have alopecia. pupils are equal, round and reactive. Oral mucosa is clean and intact. No ulcerations or mucositis noted. No bleeding noted.  RESP:Chest symmetric lungs are clear bilaterally per auscultation. Regular respiratory rate. No wheezes or rhonchi.    CV: Normal S1  S2 Regular, rate and rhythm. No murmurs.  ABD: Nondistended, soft, slight epigastric tenderness. Positive bowel sounds. No organomegaly.   EXTREMITIES: No lower extremity edema.  Noticing slight increased edema around the lower part of her ankles.  NEURO: non focal. Alert and oriented x3. Slight weakness on the right side of her face.  She has some trouble walking.   PSYCH: within normal limits. No depression or anxiety.  SKIN: warm dry intact .      Lab Results     Results for orders placed or performed in visit on 07/06/17   HM1 (CBC with Diff)   Result Value Ref Range    WBC 8.0 4.0 - 11.0 thou/uL    RBC 3.70 (L) 3.80 - 5.40 mill/uL    Hemoglobin 10.8 (L) 12.0 - 16.0 g/dL    Hematocrit 34.5 (L) 35.0 - 47.0 %    MCV 93 80 - 100 fL    MCH 29.2 27.0 - 34.0 pg    MCHC 31.3 (L) 32.0 - 36.0 g/dL    RDW 15.6 (H) 11.0 - 14.5 %    Platelets 169 140 - 440 thou/uL    MPV 9.9 8.5 - 12.5 fL    Neutrophils % 69 50 - 70 %    Lymphocytes % 13 (L) 20 - 40 %    Monocytes % 10 2 - 10 %    Eosinophils % 7 (H) 0 - 6 %    Basophils % 1 0 - 2 %    Neutrophils Absolute 5.5 2.0 - 7.7 thou/uL    Lymphocytes Absolute 1.0 0.8 - 4.4 thou/uL    Monocytes Absolute 0.8 0.0 - 0.9 thou/uL    Eosinophils Absolute 0.6 (H) 0.0 - 0.4 thou/uL    Basophils Absolute 0.0 0.0 - 0.2 thou/uL         Distress assessment and ECOG status      ECOG Performance:    ECOG Performance Status: 1    Distress Assessment  Distress Assessment Score: No distress       Imaging Results       No results found.        Signed by: Jemal Evans MD

## 2021-06-12 NOTE — PROGRESS NOTES
Pt here for tecentriq after seeing MD.  No problems with infusion and IV removed upon completion.  Pt states feeling pretty good, denies new complaint.  Pt d/c to lobby with her daughter and is aware of treatment plan

## 2021-06-12 NOTE — PROGRESS NOTES
"ACUPUNCTURIST TREATMENT NOTE    Name: Blanca Oreilly  :  1933  MRN:  [id number]    Acupuncture Treatment  Patient Type: WW CCC  Intervention Reason: Neuropathy, Neuropathy 2  Pre-session Neuropathy ratin  Post-session Neuropathy ratin  Pre-session Neuropathy 2 ratin  Post-session Neuropathy 2 ratin  Patient complaint:: neuropathy in hands and feet  Acupuncture (Points):: Du 20, SSC, LI 11, LI 4, St 36, Sp 6, Maida 3, Ki 3  Practitioner Observed: 30 minute treatment  Checklist: Progress Note Completed, Consent Reveiwed    \"Risks and benefits of acupuncture were discussed with patient. Consent for treatment was given. We thank you for the referral.\"     Mirian Dawson L.Ac.    Date:  2017  Time:  3:22 PM    "

## 2021-06-12 NOTE — PROGRESS NOTES
[facility] Hematology and Oncology Progress Note    Patient: Blanca Oreilly  MRN: [id number]  Date of Service:         Reason for Visit    Chief Complaint   Patient presents with     HE Cancer     Lung Cancer       Assessment and Plan  Malignant neoplasm of upper lobe bronchus, right    Staging form: Lung, AJCC 7th Edition    - Clinical: Stage IV (T4, N0, M1a) - Signed by Jemal Evans MD on 10/20/2014          Prognostic indicators: EGFR and ALK negative       - Pathologic: No stage assigned - Unsigned          Prognostic indicators: EGFR and ALK negative    1.  This is a pleasant 83-year-old woman with stage IV non-small cell lung cancer, adenocarcinoma.  She was negative for EGFR and ALK mutations and positive with PDL 1.  She recently had progression so is on Tecentriq. We will give cycle 4 today. We will reimage with PET scan after this cycle. Clinically pt feels good.     2. Anemia: chemo induced and usually cumulative. Overall asymptomatic except fatigue. Continue to monitor.     3.  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 Acupuncture is helping. Continue to monitor.     4.  Chronic renal insufficiency: This is near her baseline.  We will continue to monitor it is slightly better today.  Encourage good hydration.    5. Hypertension: higher than normal today. She monitors regularly at home and it has not been too bad. If consistently above 160/90, call PCP      ECOG Performance   ECOG Performance Status: 1     Distress Assessment  Distress Assessment Score: No distress    Pain  Currently in Pain: No/denies      Problem List    1. Antineoplastic chemotherapy induced anemia     2. Malignant neoplasm of upper lobe bronchus, right  [facility] OFFICE VISIT LONG    Infusion Appointment    [facility] OFFICE VISIT LONG    NM PET CT Skull to Mid Thigh   3. Chemotherapy-induced peripheral neuropathy     4. Chronic Renal Insufficiency     5. Essential hypertension with goal blood pressure less than 140/90            ______________________________________________________________________________    History of Present Illness    Blanca Oreilly is a very pleasant 83 y.o. old female with a history of non-small cell lung cancer located in the right lung measuring 5.7 cm in size presenting with some shortness of breath associated with malignant pleural effusion in the month of October 2014.  Pleural tap was positive for malignant cells adenocarcinoma in nature.  CK 7 positive CK 20 negative TTF-1 positive.  Subsequently that she was admitted due to worsening shortness of breath and her pleural effusion was tapped . She had Pleurx catheter placed but then removed as pleural fluid has reduced to few cc per week.   Her PET scan done at [facility] showed disease limited to thorax only. She has started on chemotherapy with carboplatin and Taxol with avastin. Has had 4 doses. Her PET scan showed nearly complete response. So we started her on Alimta and Avastin. She started that in February 2015.     She had CT in September 2015 which showed some worsening of subcrinal LN. Then had pet scan. That shows progression. Started back on Carboplatin and Taxol. We did give her Avastin but then we had to hold it due to protienuria. CT scan after 2 cycles showed improvement. PET scan after 4 showed partial response. She was tested for PD-L1 and was positive for both opdivo and Pembrolizumab. So we started on Pembrolizumab in late January 2016. However PET scan after 3 cycles showed progression. So in April we started on Opdivo.  Was in hospital in the beginning of September 2016 with GI bleed and was found to have gastric ulcer. She was started on PPI. Coumadin stopped. In October 2016 her PET scan showed progression. Opdivo has been stopped. Her PET scan also showed some fullness on her kidney. Her creatinine had gone up. She was then admitted and started on some IV fluids and seen by nephrology. She has been started on some prednisone for concern  that this might be autoimmune nephritis.      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 High doses of steroids actually give her diabetes. In the meantime she also lost her  was battling myelodysplastic syndrome. She had a follow-up endoscopy for the gastric ulcer and that came back positive for adenocarcinoma consistent with lung primary. She has been started on salvage chemotherapy with single agent Abraxane. She had 1 cycle in November.   Her last PET scan in March showed a complete response. She continued on abraxane, but had worsening neuropathy so she stopped treatment in April. In may, her PET scan showed progression. She has started on Tecentriq. Here for cycle 4    Pain Status  Currently in Pain: No/denies    Review of Systems    Constitutional  Constitutional (WDL): Exceptions to WDL  Fatigue: None  Fever: None  Chills: None  Weight Gain: 5 - <10% from baseline (3# since 7/6/17)  Weight Loss: None  Neurosensory  Neurosensory (WDL): Exceptions to WDL  Peripheral Motor Neuropathy: Asymptomatic, clinical or diagnostic observations only, intervention not indicated (hands and feet-accupuncture helping)  Ataxia: Asymptomatic, clinical or diagnostic observations only, intervention not indicated  Peripheral Sensory Neuropathy: Asymptomatic, loss of deep tendon reflexes or paresthesia (bilat hands and feet)  Confusion: None  Syncope: None  Eye   Eye Disorder (WDL): All eye disorder elements are within defined limits (wears glasses)  Ear  Ear Disorder (WDL): All ear disorder elements are within defined limits  Ear Pain: None (had left ear pain-resolved now)  Tinnitus: None  Cardiovascular  Cardiovascular (WDL): Exceptions to WDL  Palpitations: None  Edema: Yes (bilat ankles)  Edema Limbs: 5 - 10% inter-limb discrepancy in volume or circumference at point of greatest visible difference, swelling or obscuration of anatomic architecture on close inspection  Pulmonary  Respiratory (WDL): Exceptions to  WDL  Cough: None  Dyspnea: Shortness of breath with moderate exertion  Hypoxia: None  Gastrointestinal  Gastrointestinal (WDL): Exceptions to WDL  Anorexia: None  Constipation: None  Diarrhea: Increase of <4 stools per day over baseline, mild increase in ostomy output compared to baseline  Dysphagia: None  Esophagitis: None  Nausea: None  Pharyngitis: None  Vomiting: None  Dysgeusia: None  Dry Mouth: None  Genitourinary  Genitourinary (WDL): All genitourinary elements are within defined limits  Lymphatic  Lymph (WDL): All lymph disorder elements are within defined limits  Musculoskeletal and Connective Tissue  Musculoskeletal and Connetive Tissue Disorders (WDL): All Musculoskeletal and Connetive Tissue Disorder elements are within defined limits  Integumentary  Integumentary (WDL): Exceptions to WDL  Alopecia: Hair loss of >50% normal for that individual that is readily apparent to others, a wig or hair piece is necessary if the patient desires to completely camouflage the hair loss, associated with psychosocial impact  Patient Coping  Patient Coping: Accepting  Distress Assessment  Distress Assessment Score: No distress  Accompanied by  Accompanied by: Family Member (daughter, Amanda)    Past History  Past Medical History:   Diagnosis Date     A-fib      Anemia      Cancer of lung      Carotid stenosis      Chronic kidney disease      CKD (chronic kidney disease)      Coronary artery disease      Diabetes mellitus 11/1/2016     Diabetes mellitus, type II      Exacerbation of asthma 4/4/2007     High cholesterol      HTN (hypertension)      Hyperlipidemia      Hypertension      Peripheral neuropathy      Pleural effusion      Primary lung adenocarcinoma     Stage IV NSCLC     Upper GI bleed        PHYSICAL EXAM:  BP (!) 211/84  Pulse 60  Temp 97.4  F (36.3  C) (Oral)   Wt 116 lb 8 oz (52.8 kg)  SpO2 96%  BMI 22.01 kg/m2  GENERAL: no acute distress. Cooperative in conversation. Here with daughter today  HEENT:  pupils are equal, round and reactive. Oromucosa is clean and intact. No ulcerations or mucositis noted. No bleeding noted.  RESP: lungs are clear bilaterally per auscultation. Regular respiratory rate. No wheezes or rhonchi.  CV: Regular, rate and rhythm. No murmurs.  ABD: soft, nontender. Positive bowel sounds. No organomegaly.   MUSCULOSKELETAL: Mild bilateral lower extremity swelling.   NEURO: non focal. Alert and oriented x3.  Altered sensation in her feet and hands  PSYCH: within normal limits. No depression or anxiety.  SKIN: warm dry intact   LYMPH: no cervical, supraclavicular lymphadenopathy      Lab Results    Recent Results (from the past 168 hour(s))   Comprehensive Metabolic Panel   Result Value Ref Range    Sodium 144 136 - 145 mmol/L    Potassium 3.9 3.5 - 5.0 mmol/L    Chloride 105 98 - 107 mmol/L    CO2 30 22 - 31 mmol/L    Anion Gap, Calculation 9 5 - 18 mmol/L    Glucose 144 (H) 70 - 125 mg/dL    BUN 25 8 - 28 mg/dL    Creatinine 1.27 (H) 0.60 - 1.10 mg/dL    GFR MDRD Af Amer 49 (L) >60 mL/min/1.73m2    GFR MDRD Non Af Amer 40 (L) >60 mL/min/1.73m2    Bilirubin, Total 0.6 0.0 - 1.0 mg/dL    Calcium 10.4 8.5 - 10.5 mg/dL    Protein, Total 7.0 6.0 - 8.0 g/dL    Albumin 3.8 3.5 - 5.0 g/dL    Alkaline Phosphatase 83 45 - 120 U/L    AST 20 0 - 40 U/L    ALT 20 0 - 45 U/L   TSH   Result Value Ref Range    TSH 2.48 0.30 - 5.00 uIU/mL   HM1 (CBC with Diff)   Result Value Ref Range    WBC 7.3 4.0 - 11.0 thou/uL    RBC 3.83 3.80 - 5.40 mill/uL    Hemoglobin 11.3 (L) 12.0 - 16.0 g/dL    Hematocrit 35.6 35.0 - 47.0 %    MCV 93 80 - 100 fL    MCH 29.5 27.0 - 34.0 pg    MCHC 31.7 (L) 32.0 - 36.0 g/dL    RDW 15.0 (H) 11.0 - 14.5 %    Platelets 197 140 - 440 thou/uL    MPV 10.4 8.5 - 12.5 fL    Neutrophils % 69 50 - 70 %    Lymphocytes % 14 (L) 20 - 40 %    Monocytes % 9 2 - 10 %    Eosinophils % 7 (H) 0 - 6 %    Basophils % 1 0 - 2 %    Neutrophils Absolute 5.0 2.0 - 7.7 thou/uL    Lymphocytes Absolute 1.0 0.8 -  4.4 thou/uL    Monocytes Absolute 0.7 0.0 - 0.9 thou/uL    Eosinophils Absolute 0.5 (H) 0.0 - 0.4 thou/uL    Basophils Absolute 0.0 0.0 - 0.2 thou/uL       Imaging    No results found.      Signed by: Kay Kim, CNP

## 2021-06-12 NOTE — PROGRESS NOTES
"ACUPUNCTURIST TREATMENT NOTE    Name: Blanca Oreilly  :  1933  MRN:  [id number]    Acupuncture Treatment  Patient Type: WW CCC  Intervention Reason: Neuropathy, Neuropathy 2  Pre-session Neuropathy ratin  Post-session Neuropathy ratin  Pre-session Neuropathy 2 ratin  Post-session Neuropathy 2 ratin  Patient complaint:: neuropathy in hands and feet  Acupuncture (Points):: Du 20, SSC, LI 11, Pc 6, LI 4, St 36, Sp 6, Maida 3  Practitioner Observed: 30 minute treatment  Checklist: Progress Note Completed, Consent Reveiwed    \"Risks and benefits of acupuncture were discussed with patient. Consent for treatment was given. We thank you for the referral.\"     Mirian Dawson L.Ac.    Date:  2017  Time:  2:40 PM    "

## 2021-06-12 NOTE — PROGRESS NOTES
"ACUPUNCTURIST TREATMENT NOTE    Name: Blanca Oreilly  :  1933  MRN:  [id number]    Acupuncture Treatment  Patient Type: WW CCC  Intervention Reason: Neuropathy, Neuropathy 2  Pre-session Neuropathy ratin  Post-session Neuropathy ratin  Pre-session Neuropathy 2 ratin  Post-session Neuropathy 2 ratin  Patient complaint:: neuropathy in hands and feet  Acupuncture (Points):: Du 20, LI 11, Pc 6, Ht 7, St 37, Ki 7, Maida 3, Ki 3  Practitioner Observed: 30 minute treatment  Checklist: Progress Note Completed, Consent Reveiwed    \"Risks and benefits of acupuncture were discussed with patient. Consent for treatment was given. We thank you for the referral.\"     Mirian Dawson L.Ac.    Date:  2017  Time:  3:12 PM    "

## 2021-06-12 NOTE — PROGRESS NOTES
ASSESSMENT: Onychauxis, diabetes with neurologic manifestations, foot pain.    PLAN: Toenails were debrided manually and mechanically x10. Return to clinic in nine weeks.         SUBJECTIVE: The patient returns to the [facility] for help with her toenails which are long, thick and painful. She has altered sensation in fingers and toes following chemotherapy treatments for cancer.  She is also diabetic. She has not seen bleeding or drainage. She denies any foot trauma.  Last clinic visit was July 10, 2017.    OBJECTIVE:  General: Pleasant 83 y.o. female in no acute distress.  Vascular: DP pulses are palpable. PT pulses are palpable. Pedal hair is diminished. Feet are cool to the touch.  Neuro: Sensation in the feet is altered. Patient describes paresthesias.  Derm: Toenails are elongated, thickened and dystrophic with discoloration and subungual debris. Skin is thin and shiny but intact.   Musculoskeletal: Hammertoes.

## 2021-06-12 NOTE — PROGRESS NOTES
"ACUPUNCTURIST TREATMENT NOTE    Name: Blanca Oreilly  :  1933  MRN:  [id number]    Acupuncture Treatment  Patient Type: WW CCC  Intervention Reason: Neuropathy, Neuropathy 2  Pre-session Neuropathy ratin  Post-session Neuropathy ratin  Pre-session Neuropathy 2 ratin  Post-session Neuropathy 2 ratin  Patient complaint:: Neauropathy of hands and feet  Acupuncture (Points):: Lio schultz, Lio abreu  Lyla: Bilateral wrist x6, bilateral ankle x6  Practitioner Observed: 30 minute treatment.  Checklist: Progress Note Completed, Consent Reveiwed    \"Risks and benefits of acupuncture were discussed with patient. Consent for treatment was given. We thank you for the referral.\"     Shar Sandoval    Date:  2017  Time:  2:52 PM    "

## 2021-06-12 NOTE — PROGRESS NOTES
[facility] Hematology and Oncology Progress Note    Patient: Blanca Oreilly  MRN: [id number]  Date of Service: 9/7/2017           Reason for visit      1. Hypercalcemia    2. Malignant neoplasm of upper lobe bronchus, right    NSCLC; EGFR and ALK-1 negative. PD-L1 strongly positive for both 22C3, 28-8    Assessment     1) Lung cancer, Right side     Primary site: Lung (Right)     Staging method: AJCC 7th Edition     Clinical: Stage IV (T4, N0, M1a)      Summary: Stage IV (T4, N0, M1a)    83 y.o.     2014, October - presented with shortness of breath associated with a malignant pleural effusion.       Pleural tap was positive for malignant adenocarcinoma.      CK 7 positive CK 20 negative TTF-1 positive.      Had Pleurx catheter placed but then removed as pleural fluid has reduced to few cc per week.     PET at [facility] showed disease limited to thorax only.     Completed 4 doses carboplatin and Taxol with avastin.     PET scan showed nearly complete response.     2015, February - started on Alimta and Avastin chemotherapies.    2015, September CT showed some worsening of subcrinal LN.     PET showed progression.     Started back on Carboplatin and Taxol.  We did give her Avastin but then we had to hold it due to protienuria.     CT scan after 2 cycles showed improvement.     PET scan after 4 showed partial response.     Tested for PD-L1 and was positive for both opdivo and Pembrolizumab.     2016, January - started on Pembrolizumab.      PET scan after 3 cycles showed progression.     2016, April - switched to Opdivo.    Hospitalized in the beginning of September 2016 with GI bleed and was found to have gastric ulcer.      She was started on PPI.      Coumadin stopped.      October 2016 PET showed progression with some fullness on her kidney.  Her creatinine had gone up.  She was admitted and started on IV fluids and seen by nephrology.  She was started on  prednisone for concern that this might be autoimmune nephritis.   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  High doses of steroids actually give her diabetes.      Lost her  who was battling myelodysplastic syndrome.      Follow-up endoscopy for the gastric ulcer showed adenocarcinoma, consistent with lung primary.      2016, November - salvage chemotherapy with single agent Abraxane.      Scan after 3 cycles showed significant improvement in the mediastinal as well as abdominal lymphadenopathy.      Hospitalized with abnormal stress test.      A reversible ischemic defect was noted.  She was put on nitrates.      No EGD since starting her chemotherapy.      PET scan in March showed complete response.    2017, April stopped Abraxane due to progressive neuropathies.     2017, May PET showed progression.     2017, May - started Atezulizumab therapy.    08/15/17 PET showed mixed response.    Dr Evans recommended continuing with current therapy.    09/07/17:    CBC - WBC, ANC and Plts WNL.  HgB quite stable @ 11.6.    CMP - a bit worse CKD saw Nephrologist last week.  Non fasting blood sugar a bit high.  Not taking insulin at this time per PCP.    She is not having any more GI bleeding.  Her hemoglobin is quite stable.  Clinically she appears to be doing well with improved neurological symtoms. She states she is tolerating current therapy very well.    Proceed with C6 Atezulizumab therapy    09/28/17 - follow up C7 Atezulizumab therapy        TT > 25 minutes face to face with > 50% counseling and care coordination.    Gerry Abad, YANIQUE    Interim History     The patient presents anticipating ongoing palliative chemotherapy accompanied by her daughter.  She believes her neurological symptoms are getting better.  She states she has no issues with current therapy.  Her gastric ulcer is managed with Carafate and avoiding spicy foods.  She denies cough, fever, chills, headache, visual or mentation disturbance, bowel or bladder issues.  She is a retired nurse.  Her  appetite, weight and performance status are quite stable.     Past Medical History     Past Medical History:   Diagnosis Date     A-fib      Anemia      Cancer of lung      Carotid stenosis      Chronic kidney disease      CKD (chronic kidney disease)      Coronary artery disease      Diabetes mellitus 11/1/2016     Diabetes mellitus, type II      Exacerbation of asthma 4/4/2007     High cholesterol      HTN (hypertension)      Hyperlipidemia      Hypertension      Peripheral neuropathy      Pleural effusion      Primary lung adenocarcinoma     Stage IV NSCLC     Upper GI bleed      Review of Systems     Constitutional  Constitutional (WDL): All constitutional elements are within defined limitsDenies any complaints.  Neurosensory  Neurosensory (WDL): Exceptions to WDL  Peripheral Motor Neuropathy: Moderate symptoms, limiting instrumental ADL  Ataxia: Asymptomatic, clinical or diagnostic observations only, intervention not indicated  Peripheral Sensory Neuropathy: Moderate symptoms, limiting instrumental ADL (feet and hands)  Cardiovascular  Cardiovascular (WDL): All cardiovascular elements are within defined limits  Pulmonary  Cough: Mild symptoms, nonprescription intervention indicated (phlem is watery)  Dyspnea: Shortness of breath with moderate exertion  Gastrointestinal  Gastrointestinal (WDL): Exceptions to WDL  Diarrhea: Increase of <4 stools per day over baseline, mild increase in ostomy output compared to baseline (taking imodium daily)  Genitourinary  Genitourinary (WDL): Exceptions to WDL (urgency)  Urinary Frequency: Present (up 2-3 times nightly)  Integumentary  Integumentary (WDL): All integumentary elements are within defined limits  Patient Coping  Patient Coping: Accepting  ECOG Performance   1  Pain Status  Currently in Pain: No/denies  Accompanied by  Accompanied by: Family Member (daughter)      Vital Signs     Vitals:    09/07/17 1020   BP: 136/54   Pulse: (!) 59   Temp: 97.6  F (36.4  C)   SpO2:  96%       Physical Exam     GENERAL:   No acute distress.  Pleasant.  Accompanied by daughter.   HEENT:   POLLY. Anicteric sclera.  Oral mucosa is clean and intact.  No ulcerations, mucositis or bleeding noted.  RESP:   Chest symmetric.  Lungs clear.  Regular respiratory rate.  No wheezes or rhonchi.    CV:     S1 S2 heard.  Regular, rate and rhythm.  No murmur heard.  ABD:    Nondistended, soft, slight epigastric tenderness.  Positive bowel sounds.  No organomegaly.   EXTREMITIES:  No lower extremity edema.    NEURO:   Non focal. Alert and oriented x3. Slight weakness on the right side of her face.  She has some trouble walking.   PSYCH:   No apparent depression or anxiety.  SKIN:    Warm and dry.      Lab Results     Results for orders placed or performed in visit on 09/07/17   Comprehensive Metabolic Panel   Result Value Ref Range    Sodium 138 136 - 145 mmol/L    Potassium 4.0 3.5 - 5.0 mmol/L    Chloride 102 98 - 107 mmol/L    CO2 27 22 - 31 mmol/L    Anion Gap, Calculation 9 5 - 18 mmol/L    Glucose 288 (H) 70 - 125 mg/dL    BUN 26 8 - 28 mg/dL    Creatinine 1.50 (H) 0.60 - 1.10 mg/dL    GFR MDRD Af Amer 40 (L) >60 mL/min/1.73m2    GFR MDRD Non Af Amer 33 (L) >60 mL/min/1.73m2    Bilirubin, Total 0.6 0.0 - 1.0 mg/dL    Calcium 9.8 8.5 - 10.5 mg/dL    Protein, Total 6.4 6.0 - 8.0 g/dL    Albumin 3.7 3.5 - 5.0 g/dL    Alkaline Phosphatase 105 45 - 120 U/L    AST 20 0 - 40 U/L    ALT 17 0 - 45 U/L   HM1 (CBC with Diff)   Result Value Ref Range    WBC 8.7 4.0 - 11.0 thou/uL    RBC 3.99 3.80 - 5.40 mill/uL    Hemoglobin 11.6 (L) 12.0 - 16.0 g/dL    Hematocrit 36.5 35.0 - 47.0 %    MCV 92 80 - 100 fL    MCH 29.1 27.0 - 34.0 pg    MCHC 31.8 (L) 32.0 - 36.0 g/dL    RDW 14.5 11.0 - 14.5 %    Platelets 205 140 - 440 thou/uL    MPV 10.1 8.5 - 12.5 fL    Neutrophils % 73 (H) 50 - 70 %    Lymphocytes % 10 (L) 20 - 40 %    Monocytes % 9 2 - 10 %    Eosinophils % 9 (H) 0 - 6 %    Basophils % 1 0 - 2 %    Neutrophils Absolute  6.3 2.0 - 7.7 thou/uL    Lymphocytes Absolute 0.8 0.8 - 4.4 thou/uL    Monocytes Absolute 0.8 0.0 - 0.9 thou/uL    Eosinophils Absolute 0.7 (H) 0.0 - 0.4 thou/uL    Basophils Absolute 0.1 0.0 - 0.2 thou/uL         Distress assessment and ECOG status      ECOG Performance:    ECOG Performance Status: 1    Distress Assessment  Distress Assessment Score: No distress       Imaging Results       Nm Pet Ct Skull To Mid Thigh    Result Date: 8/15/2017  [facility] PET FDG/CT 8/15/2017 8:53 AM INDICATION: Right lung cancer restaging, subsequent treatment strategy. TECHNIQUE: Serum glucose level 134 mg/dL. One hour post intravenous administration of 8.9 mCi F-18 FDG, PET imaging was performed from the skull base to the mid thighs utilizing attenuation correction with concurrent axial CT and PET/CT image fusion. Dose reduction techniques were used. COMPARISON: PET CT from 05/22/2017 FINDINGS: FDG avid right pleural soft tissue nodularity previously present has completely resolved, but anterior mediastinal lymphadenopathy has increased, a moderately FDG avid right hilar lymph node has developed, and FDG avid retrocaval, aortocaval, and right retrocrural lymphadenopathy has developed in the upper abdomen. Stable small mildly FDG avid right cardiophrenic angle lymph node. Mildly FDG avid right thyroid nodule. Moderate bilateral shoulder synovitis, greater on the left. Moderate generalized cerebral and cerebellar volume loss, likely age-related. Atrophic right kidney. Severe calcified atherosclerosis. Pelvic phleboliths. Minimal degenerative change in the spine.     CONCLUSION: Mixed response

## 2021-06-12 NOTE — PROGRESS NOTES
"ACUPUNCTURIST TREATMENT NOTE    Name: Blanca Oreilly  :  1933  MRN:  [id number]    Acupuncture Treatment  Patient Type: WW CCC  Intervention Reason: Neuropathy, Neuropathy 2  Pre-session Neuropathy ratin  Post-session Neuropathy ratin  Pre-session Neuropathy 2 ratin  Post-session Neuropathy 2 ratin  Patient complaint:: Neuropathy of hands and feet  Acupuncture (Points):: Lio schultz, Lio abreu, Tb 5, Sp 3, Gb 41  Practitioner Observed: 30 minute treatment.  Checklist: Progress Note Completed, Consent Reveiwed    \"Risks and benefits of acupuncture were discussed with patient. Consent for treatment was given. We thank you for the referral.\"     Shar Sandoval    Date:  2017  Time:  2:18 PM    "

## 2021-06-12 NOTE — PROGRESS NOTES
[facility] Hematology and Oncology Progress Note    Patient: Blanca Oreilly  MRN: [id number]  Date of Service: 8/15/2017           Reason for visit      1. Malignant neoplasm of upper lobe bronchus, right    NSCLC; EGFR and ALK-1 negative. PD-L1 strongly positive for both 22C3, 28-8    Assessment     1. Very pleasant 83 y.o. woman with stage IV NSCLC, adenocarcinoma. Negative for EGFR  And ALK-1. We also checked PD-L1 and she is strongly positive.  2. Initially had 4 cycles of carboplatin, taxol and avastin. Then was on Alimta and Avastin.  Her CT scan showed slight increased precrinal and subcrinal nodes.  PET scan also confirmed progression. She has had 4 cycles Carboplatin and Taxol. PET scan showed good response.  Started on Pembrolizumab but after 4 cycles there was some progression.  She then tried and progressed on Opdivo.  Most Recently she was on Abraxane from November 2016 until April 2017. PET scan showed complete response. Stopped due to side effects. Her May 2017 PET scan showed progression. Now on Atezulizumab since end of May. PET scan showing mixed response.  3. Gastric ulcer pathology is consistent with non-small cell lung cancer.  Clinically she seems to be doing well on that her hemoglobin is stable and she has not had any more blood in her stools.  4. Improved neurological symtoms.   5. 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  6. Anxiety and depression.  7. Positive stress test for which now she is on nitrates prn.    Plan     1. Continue on Atezulizumab. Repeat PET scan in November.  2. RTC in 3 weeks.  3. Continue insulin as directed to manage her blood sugars.  4. Blood pressure management per Dr. Mayo.  5. Good diet and exercise.    Clinical stage      Lung cancer, Right side    Primary site: Lung (Right)    Staging method: AJCC 7th Edition    Clinical: Stage IV (T4, N0, M1a) signed by Jemal Evans MD on 10/20/2014  2:19 PM    Summary: Stage IV (T4, N0, M1a)      History      Blanca Oreilly is a very pleasant 83 y.o. old female with a history of  non-small cell lung cancer located in the right lung measuring 5.7 cm in size presenting with some shortness of breath associated with malignant pleural effusion in the month of October 2014.  Pleural tap was positive for malignant cells adenocarcinoma in nature.  CK 7 positive CK 20 negative TTF-1 positive.  Subsequently that she was admitted due to worsening shortness of breath and her pleural effusion was tapped . She had Pleurx catheter placed but then removed as pleural fluid has reduced to few cc per week.   Her PET scan done at [facility] showed disease limited to thorax only. She has started on chemotherapy with carboplatin and Taxol with avastin. Has had 4 doses. Her PET scan showed nearly complete response. So we started her on Alimta and Avastin. She started that in February 2015.    She had CT in September 2015 which showed some worsening of subcrinal LN. Then had pet scan. That shows progression. Started back on Carboplatin and Taxol. We did give her Avastin but then we had to hold it due to protienuria. CT scan after 2 cycles showed improvement. PET scan after 4 showed partial response. She was tested for PD-L1 and was positive for both opdivo and Pembrolizumab. So we started on Pembrolizumab in late January 2016.  However PET scan after 3 cycles showed progression. So in April we started on Opdivo.    Was in hospital in the beginning of September 2016 with GI bleed and was found to have gastric ulcer.  She was started on PPI. Coumadin stopped.  In October 2016 her PET scan  showed progression.  Opdivo has been stopped.  Her PET scan also showed some fullness on her kidney.  Her creatinine had gone up.  She was then admitted and started on some IV fluids and seen by nephrology.  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  High doses  of steroids actually give her diabetes.  In the meantime she also lost her  was battling myelodysplastic syndrome.  She had a follow-up endoscopy for the gastric ulcer and that came back positive for adenocarcinoma consistent with lung primary.  She has been started on salvage chemotherapy with single agent Abraxane.  She had 1 cycle in November.  She is here for her next cycle.    She is managing her diabetes well.  Dr. Mayo started her on insulin.  Her blood sugars are getting better.    Tolerating abraxane well. Blood sugars ok.  The scan after 3 cycles showed significant improvement in the mediastinal as well as abdominal lymphadenopathy.  She is not having any more GI bleeding.  Her hemoglobin is going up.  She did have an abnormal stress test for which she ended up being in the hospital.  A reversible ischemic defect was noted.  She has been put on nitrates.  She is otherwise doing well.  She has not had any EGD since starting her chemotherapy.  She had PET scan in March which actually showed complete response.    She continued Abraxane however her neuropathy seems to have gotten worse.  We stopped treatment in April 2017.     PET scan in May 2017 showed progression. I recommended trying Tecentriq which she started end of May. She has been tolerating it well. Comes in for next treatment and had PET scan this am. Her neurological symptoms are getting better.       Past Medical History     Past Medical History:   Diagnosis Date     A-fib      Anemia      Cancer of lung      Carotid stenosis      Chronic kidney disease      CKD (chronic kidney disease)      Coronary artery disease      Diabetes mellitus 11/1/2016     Diabetes mellitus, type II      Exacerbation of asthma 4/4/2007     High cholesterol      HTN (hypertension)      Hyperlipidemia      Hypertension      Peripheral neuropathy      Pleural effusion      Primary lung adenocarcinoma     Stage IV NSCLC     Upper GI bleed      Review of Systems  "  Constitutional  Constitutional (WDL): All constitutional elements are within defined limitsDenies any complaints.  Neurosensory  Neurosensory (WDL): Exceptions to WDL  Peripheral Motor Neuropathy: Moderate symptoms, limiting instrumental ADL  Ataxia: Asymptomatic, clinical or diagnostic observations only, intervention not indicated  Peripheral Sensory Neuropathy: Moderate symptoms, limiting instrumental ADL (feet and hands no change accupunture)  Cardiovascular  Cardiovascular (WDL): All cardiovascular elements are within defined limits  Pulmonary  Dyspnea: Shortness of breath with moderate exertion  Gastrointestinal  Gastrointestinal (WDL): Exceptions to WDL  Anorexia: Loss of appetite without alteration in eating habits  Diarrhea: Increase of <4 stools per day over baseline, mild increase in ostomy output compared to baseline (taking imodium daily)  Genitourinary  Genitourinary (WDL): Exceptions to WDL (urgency)  Urinary Frequency: Present (up 2-3 times nightly)  Integumentary  Integumentary (WDL): All integumentary elements are within defined limits  Patient Coping  Patient Coping: Accepting;Anxiety  ECOG Performance   1  Pain Status  Currently in Pain: No/denies (occ pain rt side \"twinges\")  Accompanied by  Accompanied by: Family Member (daughter)      Vital Signs     Vitals:    08/15/17 0921   BP: (!) 221/86   Temp:    SpO2:        Physical Exam     GENERAL: no acute distress. Cooperative in conversation.   HEENT: Starting to have alopecia. pupils are equal, round and reactive. Oral mucosa is clean and intact. No ulcerations or mucositis noted. No bleeding noted.  RESP:Chest symmetric lungs are clear bilaterally per auscultation. Regular respiratory rate. No wheezes or rhonchi.    CV: Normal S1 S2 Regular, rate and rhythm. No murmurs.  ABD: Nondistended, soft, slight epigastric tenderness. Positive bowel sounds. No organomegaly.   EXTREMITIES: No lower extremity edema.  Noticing slight increased edema around " the lower part of her ankles.  NEURO: non focal. Alert and oriented x3. Slight weakness on the right side of her face.  She has some trouble walking.   PSYCH: within normal limits. No depression or anxiety.  SKIN: warm dry intact .      Lab Results     Results for orders placed or performed in visit on 08/15/17   Comprehensive Metabolic Panel   Result Value Ref Range    Sodium 142 136 - 145 mmol/L    Potassium 3.5 3.5 - 5.0 mmol/L    Chloride 102 98 - 107 mmol/L    CO2 30 22 - 31 mmol/L    Anion Gap, Calculation 10 5 - 18 mmol/L    Glucose 146 (H) 70 - 125 mg/dL    BUN 15 8 - 28 mg/dL    Creatinine 1.28 (H) 0.60 - 1.10 mg/dL    GFR MDRD Af Amer 48 (L) >60 mL/min/1.73m2    GFR MDRD Non Af Amer 40 (L) >60 mL/min/1.73m2    Bilirubin, Total 0.8 0.0 - 1.0 mg/dL    Calcium 11.0 (H) 8.5 - 10.5 mg/dL    Protein, Total 7.5 6.0 - 8.0 g/dL    Albumin 4.1 3.5 - 5.0 g/dL    Alkaline Phosphatase 102 45 - 120 U/L    AST 21 0 - 40 U/L    ALT 21 0 - 45 U/L   HM1 (CBC with Diff)   Result Value Ref Range    WBC 11.6 (H) 4.0 - 11.0 thou/uL    RBC 4.24 3.80 - 5.40 mill/uL    Hemoglobin 12.7 12.0 - 16.0 g/dL    Hematocrit 38.4 35.0 - 47.0 %    MCV 91 80 - 100 fL    MCH 30.0 27.0 - 34.0 pg    MCHC 33.1 32.0 - 36.0 g/dL    RDW 14.8 (H) 11.0 - 14.5 %    Platelets 236 140 - 440 thou/uL    MPV 10.3 8.5 - 12.5 fL    Neutrophils % 77 (H) 50 - 70 %    Lymphocytes % 7 (L) 20 - 40 %    Monocytes % 8 2 - 10 %    Eosinophils % 8 (H) 0 - 6 %    Basophils % 1 0 - 2 %    Neutrophils Absolute 8.9 (H) 2.0 - 7.7 thou/uL    Lymphocytes Absolute 0.9 0.8 - 4.4 thou/uL    Monocytes Absolute 0.9 0.0 - 0.9 thou/uL    Eosinophils Absolute 0.9 (H) 0.0 - 0.4 thou/uL    Basophils Absolute 0.1 0.0 - 0.2 thou/uL         Distress assessment and ECOG status      ECOG Performance:    ECOG Performance Status: 1    Distress Assessment  Distress Assessment Score: Extreme distress (scan results)       Imaging Results       Nm Pet Ct Skull To Mid Thigh    Result Date:  8/15/2017  [facility] PET FDG/CT 8/15/2017 8:53 AM INDICATION: Right lung cancer restaging, subsequent treatment strategy. TECHNIQUE: Serum glucose level 134 mg/dL. One hour post intravenous administration of 8.9 mCi F-18 FDG, PET imaging was performed from the skull base to the mid thighs utilizing attenuation correction with concurrent axial CT and PET/CT image fusion. Dose reduction techniques were used. COMPARISON: PET CT from 05/22/2017 FINDINGS: FDG avid right pleural soft tissue nodularity previously present has completely resolved, but anterior mediastinal lymphadenopathy has increased, a moderately FDG avid right hilar lymph node has developed, and FDG avid retrocaval, aortocaval, and right retrocrural lymphadenopathy has developed in the upper abdomen. Stable small mildly FDG avid right cardiophrenic angle lymph node. Mildly FDG avid right thyroid nodule. Moderate bilateral shoulder synovitis, greater on the left. Moderate generalized cerebral and cerebellar volume loss, likely age-related. Atrophic right kidney. Severe calcified atherosclerosis. Pelvic phleboliths. Minimal degenerative change in the spine.     CONCLUSION: Mixed response          Signed by: Jemal Evans MD

## 2021-06-12 NOTE — PROGRESS NOTES
Pt ambulates to infusion center following NP visit.  Pt seen by APARNA Dwyer CNP and orders approved.  Peripheral IV started w/excellent blood return noted.  Atezolizumab infused as ordered without complication.  Peripheral IV flushed w/NS et dc'd.  Pt left clinic stable to [facility] accompanied by her daughter.  Plan RTC as scheduled.

## 2021-06-13 ENCOUNTER — HEALTH MAINTENANCE LETTER (OUTPATIENT)
Age: 86
End: 2021-06-13

## 2021-06-13 NOTE — PROGRESS NOTES
Blanca was in today for lab check.  Per Dr Evans, they are looking better but he does want her to do 1 more week of prednisone at 60mg daily.  She will have her labs rechecked when she comes in next Friday for her PET scan.  She has plenty of pills and will call with any questions.    Francisca Ortiz RN 11/2/17 [id number]

## 2021-06-13 NOTE — PROGRESS NOTES
Blanca was in today for recheck of her liver functions.  They are actually getting worse, not better.  She was on 40mg daily for the last week and 50mg daily for the week previous to that.  Today she started 30mg.  Per Dr Evans, he would like her to take 60mg daily for the next 2 weeks.  She will go home and take an additional 30mg to start the 60mg today. We will recheck her levels in 2 weeks, which is already scheduled.  Her daughter was with her today so she was able to hear the instructions as well.  I have sent a note to Dr Garcia to notify him that her prednisone will be increased and she is concerned about how high her blood sugars will go and how she should adjust them.  She is in need of more tests strips as well as more insulin if she needs to increase what she is using.  I let her know that if someone does not reach out to her sooner than Monday, that she should ask about this when she calls in her glucose levels on Monday.  She verbalized understanding.    Francisca Ortiz RN 10/19/17 [id number]

## 2021-06-13 NOTE — PROGRESS NOTES
[facility] Hematology and Oncology Progress Note    Patient: Blanca Oreilly  MRN: [id number]  Date of Service: 9/28/2017           Reason for visit      1. Malignant neoplasm of upper lobe bronchus, right    2. Elevated LFTs    NSCLC; EGFR and ALK-1 negative. PD-L1 strongly positive for both 22C3, 28-8    Assessment     1. Very pleasant 83 y.o. woman with stage IV NSCLC, adenocarcinoma. Negative for EGFR  And ALK-1. We also checked PD-L1 and she is strongly positive.  2. Initially had 4 cycles of carboplatin, taxol and avastin. Then was on Alimta and Avastin.  Her CT scan showed slight increased precrinal and subcrinal nodes.  PET scan also confirmed progression. She has had 4 cycles Carboplatin and Taxol. PET scan showed good response.  Started on Pembrolizumab but after 4 cycles there was some progression.  She then tried and progressed on Opdivo.  Most Recently she was on Abraxane from November 2016 until April 2017. PET scan showed complete response. Stopped due to side effects. Her May 2017 PET scan showed progression. Now on Atezulizumab since end of May. PET scan showing mixed response.   3. Gastric ulcer pathology is consistent with non-small cell lung cancer.  Clinically she seems to be doing well on that her hemoglobin is stable and she has not had any more blood in her stools.  4. Elevated LFT, mostly Alk Phos.   5. Diabetes which is worse again. Could be secondary to Atezulizumab.  6. Anxiety and depression.  7. Positive stress test for which now she is on nitrates prn.    Plan     1. Hold Atezulizumab. RTC in a week for repeat LFT. If better then we can proceed. Repeat PET scan in November.  2. RTC in 1weeks.  3. Continue insulin as directed to manage her blood sugars.  4. US of the liver today.  5. Will send her biopsy for Strata Testing as well.    Clinical stage      Lung cancer, Right side    Primary site: Lung (Right)    Staging method: AJCC 7th Edition    Clinical: Stage IV (T4, N0, M1a) signed by  Jemal Evans MD on 10/20/2014  2:19 PM    Summary: Stage IV (T4, N0, M1a)      History     Blanca Oreilly is a very pleasant 83 y.o. old female with a history of  non-small cell lung cancer located in the right lung measuring 5.7 cm in size presenting with some shortness of breath associated with malignant pleural effusion in the month of October 2014.  Pleural tap was positive for malignant cells adenocarcinoma in nature.  CK 7 positive CK 20 negative TTF-1 positive.  Subsequently that she was admitted due to worsening shortness of breath and her pleural effusion was tapped . She had Pleurx catheter placed but then removed as pleural fluid has reduced to few cc per week.   Her PET scan done at [facility] showed disease limited to thorax only. She has started on chemotherapy with carboplatin and Taxol with avastin. Has had 4 doses. Her PET scan showed nearly complete response. So we started her on Alimta and Avastin. She started that in February 2015.    She had CT in September 2015 which showed some worsening of subcrinal LN. Then had pet scan. That shows progression. Started back on Carboplatin and Taxol. We did give her Avastin but then we had to hold it due to protienuria. CT scan after 2 cycles showed improvement. PET scan after 4 showed partial response. She was tested for PD-L1 and was positive for both opdivo and Pembrolizumab. So we started on Pembrolizumab in late January 2016.  However PET scan after 3 cycles showed progression. So in April we started on Opdivo.    Was in hospital in the beginning of September 2016 with GI bleed and was found to have gastric ulcer.  She was started on PPI. Coumadin stopped.  In October 2016 her PET scan  showed progression.  Opdivo has been stopped.  Her PET scan also showed some fullness on her kidney.  Her creatinine had gone up.  She was then admitted and started on some IV fluids and seen by nephrology.  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  High doses of steroids actually give her diabetes.  In the meantime she also lost her  was battling myelodysplastic syndrome.  She had a follow-up endoscopy for the gastric ulcer and that came back positive for adenocarcinoma consistent with lung primary.  She has been started on salvage chemotherapy with single agent Abraxane.  She had 1 cycle in November.  She is here for her next cycle.    She is managing her diabetes well.  Dr. Mayo started her on insulin.  Her blood sugars are getting better.    Tolerating abraxane well. Blood sugars ok.  The scan after 3 cycles showed significant improvement in the mediastinal as well as abdominal lymphadenopathy.  She is not having any more GI bleeding.  Her hemoglobin is going up.  She did have an abnormal stress test for which she ended up being in the hospital.  A reversible ischemic defect was noted.  She has been put on nitrates.  She is otherwise doing well.  She has not had any EGD since starting her chemotherapy.  She had PET scan in March which actually showed complete response.    She continued Abraxane however her neuropathy seems to have gotten worse.  We stopped treatment in April 2017.     PET scan in May 2017 showed progression. I recommended trying Tecentriq which she started end of May. She has been tolerating it well. Comes in for next treatment . PET scan in August showed partial response.     Her neurological symptoms are getting better. She however had worsening of her DM. Needed to go back on insulin.       Past Medical History     Past Medical History:   Diagnosis Date     A-fib      Anemia      Cancer of lung      Carotid stenosis      Chronic kidney disease      CKD (chronic kidney disease)      Coronary artery disease      Diabetes mellitus 11/1/2016     Diabetes mellitus, type II      Exacerbation of asthma 4/4/2007     High cholesterol      HTN  (hypertension)      Hyperlipidemia      Hypertension      Peripheral neuropathy      Pleural effusion      Primary lung adenocarcinoma     Stage IV NSCLC     Upper GI bleed      Review of Systems   Constitutional  Constitutional (WDL): All constitutional elements are within defined limitsDenies any complaints.  Neurosensory  Neurosensory (WDL): Exceptions to WDL  Peripheral Motor Neuropathy: Moderate symptoms, limiting instrumental ADL  Ataxia: Asymptomatic, clinical or diagnostic observations only, intervention not indicated  Peripheral Sensory Neuropathy: Moderate symptoms, limiting instrumental ADL (hands and feet)  Cardiovascular  Cardiovascular (WDL): All cardiovascular elements are within defined limits  Pulmonary  Dyspnea: Shortness of breath with moderate exertion  Gastrointestinal  Gastrointestinal (WDL): Exceptions to WDL  Diarrhea: Increase of <4 stools per day over baseline, mild increase in ostomy output compared to baseline (imodium daily)  Genitourinary  Genitourinary (WDL): Exceptions to WDL (urgency)  Integumentary  Integumentary (WDL): All integumentary elements are within defined limits  Patient Coping  Patient Coping: Accepting  ECOG Performance   1  Pain Status  Currently in Pain: Yes  Accompanied by  Accompanied by: Family Member (daughter)      Vital Signs     Vitals:    09/28/17 0937   BP: 150/62   Pulse: 60   Temp: 97.6  F (36.4  C)   SpO2: 95%       Physical Exam     GENERAL: no acute distress. Cooperative in conversation.   HEENT: Starting to have alopecia. pupils are equal, round and reactive. Oral mucosa is clean and intact. No ulcerations or mucositis noted. No bleeding noted.  RESP:Chest symmetric lungs are clear bilaterally per auscultation. Regular respiratory rate. No wheezes or rhonchi.    CV: Normal S1 S2 Regular, rate and rhythm. No murmurs.  ABD: Nondistended, soft, slight epigastric tenderness. Positive bowel sounds. No organomegaly.   EXTREMITIES: No lower extremity edema.   Noticing slight increased edema around the lower part of her ankles.  NEURO: non focal. Alert and oriented x3. Slight weakness on the right side of her face.  She has some trouble walking.   PSYCH: within normal limits. No depression or anxiety.  SKIN: warm dry intact .      Lab Results     Results for orders placed or performed in visit on 09/28/17   Comprehensive Metabolic Panel   Result Value Ref Range    Sodium 139 136 - 145 mmol/L    Potassium 3.6 3.5 - 5.0 mmol/L    Chloride 103 98 - 107 mmol/L    CO2 26 22 - 31 mmol/L    Anion Gap, Calculation 10 5 - 18 mmol/L    Glucose 265 (H) 70 - 125 mg/dL    BUN 29 (H) 8 - 28 mg/dL    Creatinine 1.56 (H) 0.60 - 1.10 mg/dL    GFR MDRD Af Amer 38 (L) >60 mL/min/1.73m2    GFR MDRD Non Af Amer 32 (L) >60 mL/min/1.73m2    Bilirubin, Total 0.7 0.0 - 1.0 mg/dL    Calcium 10.4 8.5 - 10.5 mg/dL    Protein, Total 6.6 6.0 - 8.0 g/dL    Albumin 3.4 (L) 3.5 - 5.0 g/dL    Alkaline Phosphatase 750 (H) 45 - 120 U/L    AST 86 (H) 0 - 40 U/L     (H) 0 - 45 U/L   HM1 (CBC with Diff)   Result Value Ref Range    WBC 10.6 4.0 - 11.0 thou/uL    RBC 4.10 3.80 - 5.40 mill/uL    Hemoglobin 12.1 12.0 - 16.0 g/dL    Hematocrit 36.9 35.0 - 47.0 %    MCV 90 80 - 100 fL    MCH 29.5 27.0 - 34.0 pg    MCHC 32.8 32.0 - 36.0 g/dL    RDW 14.9 (H) 11.0 - 14.5 %    Platelets 233 140 - 440 thou/uL    MPV 10.9 8.5 - 12.5 fL    Neutrophils % 74 (H) 50 - 70 %    Lymphocytes % 9 (L) 20 - 40 %    Monocytes % 9 2 - 10 %    Eosinophils % 8 (H) 0 - 6 %    Basophils % 1 0 - 2 %    Neutrophils Absolute 7.8 (H) 2.0 - 7.7 thou/uL    Lymphocytes Absolute 0.9 0.8 - 4.4 thou/uL    Monocytes Absolute 0.9 0.0 - 0.9 thou/uL    Eosinophils Absolute 0.9 (H) 0.0 - 0.4 thou/uL    Basophils Absolute 0.1 0.0 - 0.2 thou/uL         Distress assessment and ECOG status      ECOG Performance:    ECOG Performance Status: 1    Distress Assessment  Distress Assessment Score: 2       Imaging Results       No results  found.        Signed by: Jemal Evans MD

## 2021-06-13 NOTE — PROGRESS NOTES
[facility] Hematology and Oncology Progress Note    Patient: Blanca Oreilly  MRN: [id number]  Date of Service:         Reason for Visit    Chief Complaint   Patient presents with     HE Cancer       Assessment and Plan  Malignant neoplasm of upper lobe bronchus, right    Staging form: Lung, AJCC 7th Edition    - Clinical: Stage IV (T4, N0, M1a) - Signed by Jemal Evans MD on 10/20/2014          Prognostic indicators: EGFR and ALK negative       - Pathologic: No stage assigned - Unsigned          Prognostic indicators: EGFR and ALK negative    1.  This is a pleasant 83-year-old woman with stage IV non-small cell lung cancer, adenocarcinoma.  She was negative for EGFR and ALK mutations and positive with PDL 1.  She recently had progression so is on Tecentriq. We will continue holding for now. We will start high dose prednisone. She will return in 6 weeks when prednisone would be tapered off, with labs, PET scan and to see Dr. Evans    2. Elevated LFT's, drug induced hepatitis: start prednisone 1mg/kg (50mg) daily for one week. Then taper as follows: 50mg for 1 week, 40mg for one week, 30mg for one week, 20mg for one week, 10mg for one week, 5mg for one week and then off. Check LFT's every 2 weeks and adjust taper if needed. US was negative for reasone of elevation. Educated on side effects of prednisone. Will take first thing in the am with food. Monitor  blood sugars very closely and call PCP if consistently over 250.          ECOG Performance   ECOG Performance Status: 1     Distress Assessment  Distress Assessment Score: 3 (possibly stopping [place])    Pain  Currently in Pain: No/denies  Pain Score (Initial OR Reassessment): No/Denies Pain      Problem List    1. Hepatitis  Comprehensive Metabolic Panel    NM PET CT Skull to Mid Thigh    HM1(CBC and Differential)   2. Flu vaccine need  influenza vacc high dose (age 65 or >) (PF) 2017-18 injection 0.5 mL (FLUZONE HIGH DOSE)   3. Malignant neoplasm of upper lobe  bronchus, right     4. Drug-induced hepatitis           ______________________________________________________________________________    History of Present Illness    Blanca Oreilly is a very pleasant 83 y.o. old female with a history of non-small cell lung cancer located in the right lung measuring 5.7 cm in size presenting with some shortness of breath associated with malignant pleural effusion in the month of October 2014.  Pleural tap was positive for malignant cells adenocarcinoma in nature.  CK 7 positive CK 20 negative TTF-1 positive.  Subsequently that she was admitted due to worsening shortness of breath and her pleural effusion was tapped . She had Pleurx catheter placed but then removed as pleural fluid has reduced to few cc per week.   Her PET scan done at [facility] showed disease limited to thorax only. She has started on chemotherapy with carboplatin and Taxol with avastin. Has had 4 doses. Her PET scan showed nearly complete response. So we started her on Alimta and Avastin. She started that in February 2015.     She had CT in September 2015 which showed some worsening of subcrinal LN. Then had pet scan. That shows progression. Started back on Carboplatin and Taxol. We did give her Avastin but then we had to hold it due to protienuria. CT scan after 2 cycles showed improvement. PET scan after 4 showed partial response. She was tested for PD-L1 and was positive for both opdivo and Pembrolizumab. So we started on Pembrolizumab in late January 2016. However PET scan after 3 cycles showed progression. So in April we started on Opdivo.  Was in hospital in the beginning of September 2016 with GI bleed and was found to have gastric ulcer. She was started on PPI. Coumadin stopped. In October 2016 her PET scan showed progression. Opdivo has been stopped. Her PET scan also showed some fullness on her kidney. Her creatinine had gone up. She was then admitted and started on some IV fluids and seen by nephrology. She  has been started on some prednisone for concern that this might be autoimmune nephritis.      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 High doses of steroids actually give her diabetes. In the meantime she also lost her  was battling myelodysplastic syndrome. She had a follow-up endoscopy for the gastric ulcer and that came back positive for adenocarcinoma consistent with lung primary. She has been started on salvage chemotherapy with single agent Abraxane. She had 1 cycle in November.   Her last PET scan in March showed a complete response. She continued on abraxane, but had worsening neuropathy so she stopped treatment in April. In may, her PET scan showed progression. She has started on Tecentriq. Has had 6 cycles. Dr. Evans held last week due to elevated lft's. Here to recheck labs and review US.     Pain Status  Currently in Pain: No/denies    Review of Systems    Constitutional  Constitutional (WDL): All constitutional elements are within defined limits  Neurosensory  Neurosensory (WDL): Exceptions to WDL  Peripheral Motor Neuropathy: Asymptomatic, clinical or diagnostic observations only, intervention not indicated  Peripheral Sensory Neuropathy: Moderate symptoms, limiting instrumental ADL  Eye   Eye Disorder (WDL): All eye disorder elements are within defined limits  Ear  Ear Disorder (WDL): All ear disorder elements are within defined limits  Cardiovascular  Cardiovascular (WDL): All cardiovascular elements are within defined limits  Pulmonary  Respiratory (WDL): Exceptions to WDL  Dyspnea: Shortness of breath with moderate exertion  Gastrointestinal  Gastrointestinal (WDL): Exceptions to WDL  Diarrhea: Increase of <4 stools per day over baseline, mild increase in ostomy output compared to baseline (Imodium helpful)  Genitourinary  Genitourinary (WDL): Exceptions to WDL  Lymphatic  Lymph (WDL): All lymph disorder elements are within defined limits  Musculoskeletal and Connective  Tissue  Musculoskeletal and Connetive Tissue Disorders (WDL): All Musculoskeletal and Connetive Tissue Disorder elements are within defined limits  Integumentary  Integumentary (WDL): All integumentary elements are within defined limits  Patient Coping  Patient Coping: Accepting  Distress Assessment  Distress Assessment Score: 3 (possibly stopping TX)  Accompanied by  Accompanied by: Family Member    Past History  Past Medical History:   Diagnosis Date     A-fib      Anemia      Cancer of lung      Carotid stenosis      Chronic kidney disease      CKD (chronic kidney disease)      Coronary artery disease      Diabetes mellitus 11/1/2016     Diabetes mellitus, type II      Exacerbation of asthma 4/4/2007     High cholesterol      HTN (hypertension)      Hyperlipidemia      Hypertension      Peripheral neuropathy      Pleural effusion      Primary lung adenocarcinoma     Stage IV NSCLC     Upper GI bleed        PHYSICAL EXAM:  BP (!) 184/74  Pulse 64  Temp 97.5  F (36.4  C) (Oral)   SpO2 97%  GENERAL: no acute distress. Cooperative in conversation. Here with daughter today  No exam done today    Lab Results    Recent Results (from the past 168 hour(s))   Comprehensive Metabolic Panel   Result Value Ref Range    Sodium 141 136 - 145 mmol/L    Potassium 3.8 3.5 - 5.0 mmol/L    Chloride 104 98 - 107 mmol/L    CO2 28 22 - 31 mmol/L    Anion Gap, Calculation 9 5 - 18 mmol/L    Glucose 156 (H) 70 - 125 mg/dL    BUN 27 8 - 28 mg/dL    Creatinine 1.36 (H) 0.60 - 1.10 mg/dL    GFR MDRD Af Amer 45 (L) >60 mL/min/1.73m2    GFR MDRD Non Af Amer 37 (L) >60 mL/min/1.73m2    Bilirubin, Total 0.8 0.0 - 1.0 mg/dL    Calcium 10.4 8.5 - 10.5 mg/dL    Protein, Total 6.6 6.0 - 8.0 g/dL    Albumin 3.4 (L) 3.5 - 5.0 g/dL    Alkaline Phosphatase 891 (H) 45 - 120 U/L     (H) 0 - 40 U/L     (H) 0 - 45 U/L   HM1 (CBC with Diff)   Result Value Ref Range    WBC 12.5 (H) 4.0 - 11.0 thou/uL    RBC 4.01 3.80 - 5.40 mill/uL     Hemoglobin 11.8 (L) 12.0 - 16.0 g/dL    Hematocrit 36.5 35.0 - 47.0 %    MCV 91 80 - 100 fL    MCH 29.4 27.0 - 34.0 pg    MCHC 32.3 32.0 - 36.0 g/dL    RDW 15.2 (H) 11.0 - 14.5 %    Platelets 209 140 - 440 thou/uL    MPV 10.6 8.5 - 12.5 fL    Neutrophils % 75 (H) 50 - 70 %    Lymphocytes % 7 (L) 20 - 40 %    Monocytes % 9 2 - 10 %    Eosinophils % 9 (H) 0 - 6 %    Basophils % 1 0 - 2 %    Neutrophils Absolute 9.3 (H) 2.0 - 7.7 thou/uL    Lymphocytes Absolute 0.8 0.8 - 4.4 thou/uL    Monocytes Absolute 1.2 (H) 0.0 - 0.9 thou/uL    Eosinophils Absolute 1.1 (H) 0.0 - 0.4 thou/uL    Basophils Absolute 0.1 0.0 - 0.2 thou/uL       Imaging    Us Abdomen Limited    Result Date: 9/28/2017  US ABDOMEN LIMITED 9/28/2017 11:38 AM INDICATION: Other specified abnormal findings of blood chemistry COMPARISON: None. FINDINGS: GALLBLADDER: Normal, without cholelithiasis. BILE DUCTS: No bile duct dilation. The common bile duct measures 3 mm. LIVER: Coarsened texture but no focal hepatic mass detected. RIGHT KIDNEY: Atrophic. No hydronephrosis. PANCREAS: In the region of the pancreatic head there is an ovoid solid nodule measuring 1.7 x 1.7 x 1.3 cm. It is difficult to determine if this is of the pancreas or a lymph node of the vipin hepatis separate from the pancreas. No ascites in the right upper quadrant.     CONCLUSION: 1.  1.7 cm nodule in the region of the pancreatic head. Differential diagnosis includes a solid mass of the pancreas and an enlarged lymph node of the vipin hepatis. Further evaluation by CT is recommended. 2.  Coarsened hepatic texture but no focal hepatic mass detected. Findings raise the possibility of hepatic fibrosis. NOTE: ABNORMAL REPORT THE DICTATION ABOVE DESCRIBES AN ABNORMALITY FOR WHICH FOLLOW-UP IS NEEDED.     Total time spent with patient was 25 minutes.  Greater than 50% of that was in counseling and care coordination.      Signed by: Kay Kim, YANIQUE

## 2021-06-14 NOTE — PROGRESS NOTES
[facility] Hematology and Oncology Progress Note    Patient: Blanca Oreilly  MRN: [id number]  Date of Service: 11/15/2017           Reason for visit      1. Malignant neoplasm of upper lobe bronchus, right, PD-L1 and [id number] mutation positive.    NSCLC; EGFR and ALK-1 negative. PD-L1 strongly positive for both 22C3, 28-8    Assessment     1. Very pleasant 84 y.o. woman with stage IV NSCLC, adenocarcinoma. Negative for EGFR  And ALK-1. We also checked PD-L1 and she is strongly positive.  On strata testing she has BRA [id number] mutation.  Current PET scan suggestive of liver metastases as well as mediastinal lymph node metastases.  2. Initially had 4 cycles of carboplatin, taxol and avastin. Then was on Alimta and Avastin.  Later on she has had repeat treatment with 4 cycles Carboplatin and Taxol. PET scan showed good response.  Started on Pembrolizumab but after 4 cycles there was some progression.  She then tried and progressed on Opdivo.  Most Recently she was on Abraxane from November 2016 until April 2017. PET scan showed complete response. Stopped due to side effects. Her May 2017 PET scan showed progression.  She was on Atezulizumab since end of May beginning of October 2017. PET scan in November 2017 showing worsening of the liver metastases.   3. Gastric ulcer pathology is consistent with non-small cell lung cancer.  Clinically she seems to be doing well on that her hemoglobin is stable and she has not had any more blood in her stools.  4. Elevated LFT, mostly Alk Phos which now appears may be due to progression of her disease.  5. Diabetes which is worse again secondary to dexamethasone.  6. Anxiety and depression.  7. Positive stress test for which now she is on nitrates prn.    Plan     1. I had lengthy discussion with the patient and her daughter.  I discussed with her the molecular mutation that her tumor carries.  Due to that reason she is a candidate for dabrafenib and try imatinib.  I gave printed  information to the patient as well as instruction about how to take it.  We will have our financial counselor to help patient with the obtaining of this medication.  2. RTC in 3-4 weeks.  3. Decrease prednisone to 10 mg a day.  4. Call if she has any other new symptoms.    Clinical stage      Lung cancer, Right side    Primary site: Lung (Right)    Staging method: AJCC 7th Edition    Clinical: Stage IV (T4, N0, M1a) signed by Jemal Evans MD on 10/20/2014  2:19 PM    Summary: Stage IV (T4, N0, M1a)      History     Blanca Oreilly is a very pleasant 84 y.o. old female with a history of  non-small cell lung cancer located in the right lung measuring 5.7 cm in size presenting with some shortness of breath associated with malignant pleural effusion in the month of October 2014.  Pleural tap was positive for malignant cells adenocarcinoma in nature.  CK 7 positive CK 20 negative TTF-1 positive.  Subsequently that she was admitted due to worsening shortness of breath and her pleural effusion was tapped . She had Pleurx catheter placed but then removed as pleural fluid has reduced to few cc per week.   Her PET scan done at [facility] showed disease limited to thorax only. She has started on chemotherapy with carboplatin and Taxol with avastin. Has had 4 doses. Her PET scan showed nearly complete response. So we started her on Alimta and Avastin. She started that in February 2015.    She had CT in September 2015 which showed some worsening of subcrinal LN. Then had pet scan. That shows progression. Started back on Carboplatin and Taxol. We did give her Avastin but then we had to hold it due to protienuria. CT scan after 2 cycles showed improvement. PET scan after 4 showed partial response. She was tested for PD-L1 and was positive for both opdivo and Pembrolizumab. So we started on Pembrolizumab in late January 2016.  However PET scan after 3 cycles showed progression. So in April we started on Opdivo.    Was in hospital in  the beginning of September 2016 with GI bleed and was found to have gastric ulcer.  She was started on PPI. Coumadin stopped.  In October 2016 her PET scan  showed progression.  Opdivo has been stopped.  Her PET scan also showed some fullness on her kidney.  Her creatinine had gone up.  She was then admitted and started on some IV fluids and seen by nephrology.  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  High doses of steroids actually give her diabetes.  In the meantime she also lost her  was battling myelodysplastic syndrome.  She had a follow-up endoscopy for the gastric ulcer and that came back positive for adenocarcinoma consistent with lung primary.  She has been started on salvage chemotherapy with single agent Abraxane.  She had 1 cycle in November.  She is here for her next cycle.    She is managing her diabetes well.  Dr. Mayo started her on insulin.  Her blood sugars are getting better.    Tolerating abraxane well. Blood sugars ok.  The scan after 3 cycles showed significant improvement in the mediastinal as well as abdominal lymphadenopathy.  She is not having any more GI bleeding.  Her hemoglobin is going up.  She did have an abnormal stress test for which she ended up being in the hospital.  A reversible ischemic defect was noted.  She has been put on nitrates.  She is otherwise doing well.  She has not had any EGD since starting her chemotherapy.  She had PET scan in March which actually showed complete response.    She continued Abraxane however her neuropathy seems to have gotten worse.  We stopped treatment in April 2017.     PET scan in May 2017 showed progression. I recommended trying Tecentriq which she started end of May 2017. She has been tolerating it well.  PET scan in August showed partial response.  However in late September she started to have elevation of her liver function  test which required me to stop her treatment.  In spite of high dose of prednisone her liver function test continue to get worse.  She had PET scan done last week which I reviewed with her over the phone which showed that she had developed new liver metastases.  Due to the fact that her tumor molecular profiling on strata testing showed that she had BRA F the 6009 mutation I have already sent in a prescription for dabrafenib and try imatinib.  She has not received this medication yet.  She is quite concerned about the cost as well as some side effects.     Past Medical History     Past Medical History:   Diagnosis Date     A-fib      Anemia      Cancer of lung      Carotid stenosis      Chronic kidney disease      CKD (chronic kidney disease)      Coronary artery disease      Diabetes mellitus 11/1/2016     Diabetes mellitus, type II      Exacerbation of asthma 4/4/2007     High cholesterol      HTN (hypertension)      Hyperlipidemia      Hypertension      Peripheral neuropathy      Pleural effusion      Primary lung adenocarcinoma     Stage IV NSCLC     Upper GI bleed      Review of Systems   Constitutional  Constitutional (WDL): Exceptions to WDL  Weight Loss: to <10% from baseline, intervention not indicated (down 8 lbs)Denies any complaints.  Neurosensory  Neurosensory (WDL): Exceptions to WDL  Peripheral Motor Neuropathy: Moderate symptoms, limiting instrumental ADL  Ataxia: Moderate symptoms, limiting instrumental ADL  Peripheral Sensory Neuropathy: Severe symptoms, limiting self care ADL (feet, fingers and around mouth)  Cardiovascular  Cardiovascular (WDL): Exceptions to WDL  Edema: Yes  Edema Face: Localized facial edema  Edema Limbs: 5 - 10% inter-limb discrepancy in volume or circumference at point of greatest visible difference, swelling or obscuration of anatomic architecture on close inspection (ankles)  Pulmonary  Cough: Mild symptoms, nonprescription intervention indicated (clear phlem)  Dyspnea:  Shortness of breath with minimal exertion, limiting instrumental ADL  Gastrointestinal  Gastrointestinal (WDL): All gastrointestinal elements are within defined limits  Genitourinary  Genitourinary (WDL): All genitourinary elements are within defined limits  Integumentary  Integumentary (WDL): All integumentary elements are within defined limits  Patient Coping  Patient Coping: Accepting;Anxiety  ECOG Performance   1  Pain Status  Currently in Pain: No/denies  Accompanied by  Accompanied by: Family Member (daughter)      Vital Signs     Vitals:    11/15/17 1115   BP: (!) 206/83   Pulse: 65   Temp: 97.8  F (36.6  C)   SpO2: 98%       Physical Exam     GENERAL: no acute distress. Cooperative in conversation.   HEENT: Starting to have alopecia. pupils are equal, round and reactive. Oral mucosa is clean and intact. No ulcerations or mucositis noted. No bleeding noted.  RESP:Chest symmetric lungs are clear bilaterally per auscultation. Regular respiratory rate. No wheezes or rhonchi.    CV: Normal S1 S2 Regular, rate and rhythm. No murmurs.  ABD: Nondistended, soft, slight epigastric tenderness. Positive bowel sounds. No organomegaly.   EXTREMITIES: No lower extremity edema.  Noticing slight increased edema around the lower part of her ankles.  NEURO: non focal. Alert and oriented x3. Slight weakness on the right side of her face.  She has some trouble walking.   PSYCH: within normal limits. No depression or anxiety.  SKIN: warm dry intact .      Lab Results     Results for orders placed or performed in visit on 11/15/17   HM1 (CBC with Diff)   Result Value Ref Range    WBC 9.6 4.0 - 11.0 thou/uL    RBC 4.27 3.80 - 5.40 mill/uL    Hemoglobin 12.5 12.0 - 16.0 g/dL    Hematocrit 38.0 35.0 - 47.0 %    MCV 89 80 - 100 fL    MCH 29.3 27.0 - 34.0 pg    MCHC 32.9 32.0 - 36.0 g/dL    RDW 17.1 (H) 11.0 - 14.5 %    Platelets 139 (L) 140 - 440 thou/uL    MPV 10.3 8.5 - 12.5 fL    Neutrophils % 89 (H) 50 - 70 %    Lymphocytes % 5  (L) 20 - 40 %    Monocytes % 5 2 - 10 %    Eosinophils % 1 0 - 6 %    Basophils % 0 0 - 2 %    Neutrophils Absolute 8.4 (H) 2.0 - 7.7 thou/uL    Lymphocytes Absolute 0.5 (L) 0.8 - 4.4 thou/uL    Monocytes Absolute 0.5 0.0 - 0.9 thou/uL    Eosinophils Absolute 0.1 0.0 - 0.4 thou/uL    Basophils Absolute 0.0 0.0 - 0.2 thou/uL         Distress assessment and ECOG status      ECOG Performance:    ECOG Performance Status: 2    Distress Assessment  Distress Assessment Score: Extreme distress (results)       Imaging Results       Nm Pet Ct Skull To Mid Thigh    Result Date: 11/10/2017  [facility] PET FDG/CT 11/10/2017 9:07 AM INDICATION: Right lung cancer restaging, subsequent treatment strategy. TECHNIQUE: Serum glucose level 55 mg/dL. One hour post intravenous administration of 10.2 mCi F-18 FDG, PET imaging was performed from the skull base to the mid thighs utilizing attenuation correction with concurrent axial CT and PET/CT image fusion. Dose reduction techniques were used. COMPARISON: PET CT from 08/15/2017 FINDINGS: The pre-existing anterior mediastinal, right hilar, right cardiophrenic angle, retrocaval, aortocaval, and left para-aortic lymphadenopathy has increased in FDG activity. The right hilar node, as an example, has increased from SUV max 6.0 to 11.6. New upper right paratracheal, lower right paratracheal, paraesophageal, gastrohepatic ligament, and portacaval lymphadenopathy has developed. A representative lower right paratracheal node measures 1.4 x 1.1 cm (SUVmax 7.8). FDG avid liver masses have developed in the central portion near the biliary hilum with consequent intrahepatic bile duct dilation. Gallbladder has become mildly distended with high attenuation intraluminal material and mild inflammatory activity has developed within its thickened  wall. Inflammatory FDG activity at injection sites in the anterior abdominal wall. Mild generalized cerebral and cerebellar volume loss, likely  age-related. Mildly FDG avid right thyroid nodule. Calcified atherosclerosis, including coronary. Decreased attenuation of blood pool relative to myocardium, suggesting anemia. Atrophic right kidney. Pelvic phleboliths. Minimal degenerative change in the spine.     CONCLUSION: Progressive disease with increased FDG activity in the pre-existing lymph node metastases, numerous new lymph node metastases, and new metastases in the liver. The liver metastases result in biliary obstruction and likely account for elevated LFTs.    Total time spent was 40 minutes, more than half of it was in face-to-face counseling regarding disease state, treatment, side effects and management.        Signed by: Jemal Evans MD

## 2021-06-14 NOTE — PROGRESS NOTES
ASSESSMENT:  1.  Left leg swelling: Blanca was sent for an emergent venous ultrasound which was diagnostic of deep venous thrombosis.  Management options were reviewed with Dr. Evans.  Blanca does have a past history of GI bleeding due to metastasis to the stomach wall.  She has not had any recent issues with bleeding.  He felt that anticoagulation was an acceptable risk.  She will start Lovenox.  Eventually she may be able to bridge to an oral agent    2.  Metastatic adenocarcinoma of the lung: She will be starting new therapy with dabrafenib and imatrib.    3.  Diabetes mellitus aggravated by steroid therapy: Recent blood sugars have been acceptable    PLAN:  1.  Emergent venous ultrasound was done and reviewed.  It showed deep venous thrombosis in the left leg  2.  Start Lovenox 1 mg/kg subcu twice daily  3.  Panic follow-up 2 weeks to reassess  4.  Continue prednisone at 10 mg daily per Dr. Evans.  She was advised that prednisone would need to be tapered and should not be stopped abruptly    Orders Placed This Encounter   Procedures     US Venous Leg Left     Standing Status:   Future     Standing Expiration Date:   12/7/2018     Order Specific Question:   Can the procedure be changed per Radiologist protocol?     Answer:   Yes     Medications Discontinued During This Encounter   Medication Reason     sucralfate (CARAFATE) 1 gram tablet Duplicate order     predniSONE (DELTASONE) 10 mg tablet Reorder       No Follow-up on file.    ASSESSED PROBLEMS:  1. Deep venous thrombosis  US Venous Leg Left   2. Edema  US Venous Leg Left       CHIEF COMPLAINT:  Chief Complaint   Patient presents with     Leg Swelling     left lower leg swelling       HISTORY OF PRESENT ILLNESS:  Blanca is a 84 y.o. female presenting to the clinic today with complaints of leg swelling. She is accompanied by her daughter.     Leg Swelling: She has developed swelling in her left leg. It has been like this for the past two weeks. It does not hurt,  "and she does not have any swelling in the groin. She was been wrapping the legs, and she thinks it would be worse without doing so. She has never had swelling to this extent in the past. She never had trouble with swelling in her legs years ago.     Lung Cancer: She has stage IV NSCLC, adenocarcinoma. She just got two new medications, dabrafenib and trametinib, but she has only taken one pill so far. She is worried about taking the medication. They are extremely expensive and have a long list of potential side effects. She is taking 10 mg of prednisone daily. She inquires if she can discontinue prednisone completely.     Diabetes: Her blood sugars have been in a good range lately. Her last hemoglobin A1c was 7.8% on 9/22/2017.     REVIEW OF SYSTEMS:   She does not eat much. She has a history of GI bleed. She has poor circulation in her extremities and states her toenails have not been growing particularly well lately. She has some numbness in her hands and feet. All other systems are negative.    PFSH:  Reviewed, as below.     TOBACCO USE:  History   Smoking Status     Former Smoker     Packs/day: 0.10     Years: 30.00     Quit date: 10/30/1980   Smokeless Tobacco     Never Used     Comment: Not a steady smoker       VITALS:  Vitals:    12/07/17 1457   BP: 122/46   Patient Site: Left Arm   Patient Position: Sitting   Cuff Size: Adult Regular   Pulse: 80   Weight: 107 lb 9.6 oz (48.8 kg)   Height: 5' 1\" (1.549 m)     Wt Readings from Last 3 Encounters:   12/07/17 107 lb 9.6 oz (48.8 kg)   11/15/17 105 lb 12.8 oz (48 kg)   11/13/17 113 lb (51.3 kg)     Body mass index is 20.33 kg/(m^2).    PHYSICAL EXAM:  Constitutional:   Reveals an alert, pleasant, talkative woman. Affect appropriate. Can move easily without assistance.  Vitals: per nursing notes.  HEENT: Atraumatic. Slight cushingoid appearance to face.   Neck:  Supple, no carotid bruits or adenopathy.  Back:  No spine or CVA pain.  Thorax:  No bony " deformities.  Lungs: Clear to A&P without rales or wheezes.  Respiratory effort normal.  Cardiac:   Regular rhythm, soft systolic murmur left sternal border.   Abdomen:  Doughy consistency, no palpable masses, no obvious ascites.   Extremities:  Left leg several cm larger in circumference than right, trace to 1+ edema on the left, no palpable cords, pulses intact. Capillary filling normal    Skin: Sallow complexion  Neuro:  Alert and oriented. Cranial nerves, motor, sensory exams are intact.  No gross focal deficits.  Psychiatric:  Memory intact, mood appropriate.    ADDITIONAL HISTORY SUMMARIZED (2): Reviewed 11/15/2017 Dr. Evans note regarding lung cancer.   DECISION TO OBTAIN EXTRA INFORMATION (1): None.   RADIOLOGY TESTS (1): Ultrasound ordered.   LABS (1): Labs from 11/15 reviewed.   MEDICINE TESTS (1): None.  INDEPENDENT REVIEW (2 each): None.     The visit lasted a total of 18 minutes face to face with the patient. Over 50% of the time was spent counseling and educating the patient about her leg swelling.    IMaximiliano, am scribing for and in the presence of, Dr. Garcia.    VICTORINA TURCIOS, personally performed the services described in this documentation, as scribed by Maximiliano Morrissey in my presence, and it is both accurate and complete.    Dragon dictation was used for this note.  Speech recognition errors are a possibility.    MEDICATIONS:  Current Outpatient Prescriptions   Medication Sig Dispense Refill     amLODIPine (NORVASC) 5 MG tablet Use twice daily.   Hold for BP under 110 systolic 180 tablet 3     aspirin 81 MG EC tablet Take 1 tablet (81 mg total) by mouth daily with breakfast.  0     atorvastatin (LIPITOR) 40 MG tablet Take 1 tablet (40 mg total) by mouth daily. 90 tablet 3     coenzyme Q10 (CO Q-10) 10 mg capsule Take 1 capsule by mouth daily.        dabrafenib 75 mg cap Take 150 mg by mouth 2 (two) times a day. One hour before or two hours after meals 120 capsule 8     INSULIN ASPART  (NOVOLOG FLEXPEN SUBQ) Inject under the skin as needed.       insulin glargine (LANTUS SOLOSTAR) 100 unit/mL (3 mL) pen Inject 12 Units under the skin daily. Do not mix Lantus with any other insulin 5 adj dose pen 0     isosorbide mononitrate (IMDUR) 30 MG 24 hr tablet Take 1 tablet (30 mg total) by mouth daily. 90 tablet 3     loperamide (IMODIUM) 2 mg capsule Take 2 mg by mouth 4 (four) times a day as needed for diarrhea.       magnesium 250 mg Tab Take 500 mg by mouth daily.        metoprolol succinate (TOPROL-XL) 50 MG 24 hr tablet Take 1.5 tablets (75 mg total) by mouth at bedtime. 135 tablet 3     multivitamin (ONE A DAY) per tablet Take 1 tablet by mouth daily.       nitroglycerin (NITROSTAT) 0.4 MG SL tablet Place 1 tablet (0.4 mg total) under the tongue every 5 (five) minutes as needed for chest pain. 10 tablet 1     predniSONE (DELTASONE) 10 mg tablet One tab daily 235 tablet 0     ranitidine (ZANTAC) 150 MG tablet TAKE 1 TAB BY MOUTH TWICE DAILY. 180 tablet 3     sucralfate (CARAFATE) 100 mg/mL suspension Take 10 mL (1 g total) by mouth 4 (four) times a day with meals and bedtime. (Patient taking differently: Take 1 g by mouth 2 (two) times a day. ) 240 mL 5     trametinib 2 mg Tab Take 2 mg by mouth daily. 30 tablet 8     No current facility-administered medications for this visit.        Total data points: 4

## 2021-06-14 NOTE — PROGRESS NOTES
ASSESSMENT: Onychauxis, diabetes with neurologic manifestations, foot pain.    PLAN: Toenails were debrided manually and mechanically x10. Return to clinic in nine weeks.         SUBJECTIVE: The patient returns to the [facility] for help with her toenails which are long, thick and painful. She has altered sensation in fingers and toes following chemotherapy treatments for cancer.  She is also diabetic. She has not seen bleeding or drainage. She denies any foot trauma.  Last clinic visit was September 11, 2017.    OBJECTIVE:  General: Pleasant 84 y.o. female in no acute distress.  Vascular: DP pulses are palpable. PT pulses are palpable. Pedal hair is diminished. Feet are cool to the touch.  Neuro: Sensation in the feet is altered. Patient describes paresthesias.  Derm: Toenails are elongated, thickened and dystrophic with discoloration and subungual debris. Skin is thin and shiny but intact.   Musculoskeletal: Hammertoes.

## 2021-06-14 NOTE — PROGRESS NOTES
ASSESSMENT:  1.  Hypotension and weakness: Brinkley hemoglobin returned at 7.6 after leaving the clinic.  She had a value of 12.5 on November 15, 10.7 on 1218, 9.5 on 12/ 20.   She has tenderness and a large hematoma in the left buttock which could be the source of blood loss since she is on aspirin and Lovenox.  She also has a past history of metastasis from the lung to her stomach with an upper GI bleed.  She reported that stool seems somewhat darker today.  However, she has not noted blood or  diarrhea.  Lovenox and aspirin will be temporarily discontinued.  She has already stopped metoprolol and amlodipine.    2.  Labile hypertension: Blood pressure is low due to anemia presumably due to acute blood loss.  She will stay off of amlodipine.  Metoprolol will be held for systolic blood pressure under 120.    3.  Acute deep venous thrombosis left leg: She has been on Lovenox for several weeks for this.  The leg currently looks well.  Lovenox will need to be held due to blood loss anemia    4.  Metastatic lung cancer: She reports poor tolerance of recent therapy.  She has discontinued this.  She will follow-up with Dr. Evans    PLAN:  1.  X-ray of the left hip and pelvis was done.  There is no evidence for fracture or lytic lesions.  Mild to moderate degenerative change of the hip was seen  2.  Stop aspirin for now.  Also stop Lovenox for the present  3.  Stay off amlodipine.  Restart metoprolol 50 mg daily for systolic over 120.  4.  Recheck hemoglobin on Tuesday.  She should go to the emergency room for increasing weakness, hypotension, or evidence for GI blood loss prior to that time.  5.  Lab results and plan were discussed with Blanca by phone after the clinic visit.    Orders Placed This Encounter   Procedures     XR Pelvis W 2 Vw Hip Left     Standing Status:   Future     Number of Occurrences:   1     Standing Expiration Date:   12/22/2018     Order Specific Question:   Can the procedure be changed per Radiologist  protocol?     Answer:   Yes     HM2(CBC w/o Differential)     HM2(CBC w/o Differential)     Standing Status:   Future     Standing Expiration Date:   12/22/2018     Medications Discontinued During This Encounter   Medication Reason     dabrafenib 75 mg cap Side effects     trametinib 2 mg Tab Side effects           ASSESSED PROBLEMS:  1. Anemia, unspecified type  HM2(CBC w/o Differential)    HM2(CBC w/o Differential)   2. Hip pain, acute, left  XR Pelvis W 2 Vw Hip Left    HYDROcodone-acetaminophen 5-325 mg per tablet       CHIEF COMPLAINT:  Chief Complaint   Patient presents with     Shortness of Breath     blood pressure is low and very high       HISTORY OF PRESENT ILLNESS:  Blanca is a 84 y.o. female presenting to the clinic today to follow up on her blood pressure and hip pain. She is accompanied by her daughter.     Pain: She has a lot of pain in her left hip and buttocks. The pain has been preventing her from sleeping well. She has not been taking anything for pain, but she would like something now. She has not had any falls, but she acknowledges that should could have bumped into something without even thinking about it.     Lung Cancer: Dr. Evans started her on molecular therapy for her lung cancer, but she did not tolerate the medications. She met with Dr. Evans on Monday and then stopped the medications on Wednesday. Her blood pressure was dropping significantly and this really worried her. She thinks the cancer medications were dehydrating her.     Hypotension: Her blood pressures have been running extremely low at home lately. Her blood pressure was normal when she saw Dr. Evans, but it is usually quite elevated in his office. She has not been taking metoprolol or amlodipine since the beginning of the week.     Anemia: Her hemoglobin has been dropping lately. Her last value was 9.5 g/dL on 12/20. It was 10.7 g/dL on 12/18 and 12.5 g/dL in November.     DVT: She had a blood clot in her left leg a couple  of weeks ago. She has been taking Lovenox since then. Her leg is doing okay for now, but she still has a little swelling.     Tachycardia: Her pulse has been quite fast lately. It is 114 in the clinic today. It has been somewhat irregular as well according to her home blood pressure machine. She has not been taking metoprolol.     REVIEW OF SYSTEMS:   She has been pushing fluids. She has had trouble with her hands cramping. All other systems are negative.    PFSH:  Reviewed, as below.     TOBACCO USE:  History   Smoking Status     Former Smoker     Packs/day: 0.10     Years: 30.00     Quit date: 10/30/1980   Smokeless Tobacco     Never Used     Comment: Not a steady smoker       VITALS:  Vitals:    12/22/17 1440 12/22/17 1459   BP: 122/58 120/48   Patient Site: Left Arm Right Arm   Patient Position: Sitting Sitting   Cuff Size: Adult Regular    Pulse: (!) 114    Temp: 97.6  F (36.4  C)    TempSrc: Tympanic    SpO2: 97%    Weight: 110 lb 4.8 oz (50 kg)    Height: 5' (1.524 m)      Wt Readings from Last 3 Encounters:   12/22/17 110 lb 4.8 oz (50 kg)   12/20/17 106 lb (48.1 kg)   12/18/17 106 lb 4.8 oz (48.2 kg)     Body mass index is 21.54 kg/(m^2).    PHYSICAL EXAM:  Constitutional:   Reveals an alert, talkative woman with a sallow complexion who appears frail but in no acute distress.  Vitals: per nursing notes.   Repeat BP by MD: 120/48, RUE, sitting. Pulse about 105 and regular.   HEENT: Atraumatic.   Neck:  Supple, no carotid bruits or adenopathy.  Back:  Minor kyphosis  Thorax:  No bony deformities.  Lungs: Clear to A&P.  Cardiac:   Regular rhythm, tachycardic. II/VI systolic murmur left sternal border.   Abdomen:  Soft, non tender.    Extremities: Faint ecchymoses with induration and tenderness in left gluteal area. No shortening or rotation of legs noted. Negative straight leg raise bilaterally. Significant discomfort in left hip with internal rotation. No significant peripheral edema.   Neuro:  Alert and  oriented. Cranial nerves, motor, sensory exams are intact.  No gross focal deficits.  Psychiatric:  Memory intact, mood appropriate.    Left Hip X-ray: Moderate degenerative changes of hip, no fractures, no lytic lesions detected.      ADDITIONAL HISTORY SUMMARIZED (2): Reviewed 12/20 ED note regarding fatigue   DECISION TO OBTAIN EXTRA INFORMATION (1): None.   RADIOLOGY TESTS (1): Left hip and pelvis X-ray ordered.   LABS (1): Labs from 12/20 reviewed. Labs ordered.   MEDICINE TESTS (1): None.  INDEPENDENT REVIEW (2 each): X-ray from today personally interpreted.     The visit lasted a total of 35 minutes face to face with the patient. Over 50% of the time was spent counseling and educating the patient about her anemia and hip pain.    IMaximiliano, am scribing for and in the presence of, Dr. Garcia.    VICTORINA TURCIOS, personally performed the services described in this documentation, as scribed by Maximiliano Morrissey in my presence, and it is both accurate and complete.    Dragon dictation was used for this note.  Speech recognition errors are a possibility.    MEDICATIONS:  Current Outpatient Prescriptions   Medication Sig Dispense Refill     aspirin 81 MG EC tablet Take 1 tablet (81 mg total) by mouth daily with breakfast.  0     atorvastatin (LIPITOR) 40 MG tablet Take 1 tablet (40 mg total) by mouth daily. 90 tablet 3     coenzyme Q10 (CO Q-10) 10 mg capsule Take 1 capsule by mouth daily.        enoxaparin (LOVENOX) 60 mg/0.6 mL syringe Inject 0.6 mL (60 mg total) under the skin every 12 (twelve) hours. 60 Syringe 0     INSULIN ASPART (NOVOLOG FLEXPEN SUBQ) Inject under the skin as needed. Dose per sliding scale       insulin glargine (LANTUS) 100 unit/mL injection Inject 6 Units under the skin at bedtime.       isosorbide mononitrate (IMDUR) 30 MG 24 hr tablet Take 1 tablet (30 mg total) by mouth daily. 90 tablet 3     magnesium 250 mg Tab Take 500 mg by mouth daily.        multivitamin (ONE A DAY) per tablet  Take 1 tablet by mouth daily.       predniSONE (DELTASONE) 5 MG tablet Take 1 tablet (5 mg total) by mouth daily. 14 tablet 0     ranitidine (ZANTAC) 150 MG tablet Take 150 mg by mouth 2 (two) times a day.       amLODIPine (NORVASC) 5 MG tablet Use twice daily.   Hold for BP under 110 systolic 180 tablet 3     HYDROcodone-acetaminophen 5-325 mg per tablet Take 1-2 tablets by mouth every 6 (six) hours as needed for pain. 30 tablet 0     loperamide (IMODIUM) 2 mg capsule Take 2 mg by mouth 4 (four) times a day as needed for diarrhea.       metoprolol succinate (TOPROL-XL) 50 MG 24 hr tablet Take 1.5 tablets (75 mg total) by mouth at bedtime. 135 tablet 3     nitroglycerin (NITROSTAT) 0.4 MG SL tablet Place 1 tablet (0.4 mg total) under the tongue every 5 (five) minutes as needed for chest pain. 10 tablet 1     No current facility-administered medications for this visit.        Total data points: 6

## 2021-06-14 NOTE — PROGRESS NOTES
I called the patient with her PET scan results which show that she has developed hepatic metastases causing some degree of biliary obstruction.  Her current bilirubin is still less than 2.  We also discussed her strata testing which showed that she does have BRA [id number] mutation.  This makes her a candidate for targeted therapy.  I am going to call in the dabrafenib and trimetnib prescription in.  Hopefully she can get it started next week.  In addition I also told her to decrease her prednisone to 20 mg a day.  I would like to see her back about a week after she starts taking these medication.  She will need to be followed closely.

## 2021-06-14 NOTE — PROGRESS NOTES
[facility] Hematology and Oncology Progress Note    Patient: Blanca Oreilly  MRN: [id number]  Date of Service: 12/18/2017           Reason for visit      1. Malignant neoplasm of upper lobe bronchus, right, PD-L1 and [id number] mutation positive.    NSCLC; EGFR and ALK-1 negative. PD-L1 strongly positive for both 22C3, 28-8    Assessment     1. Very pleasant 84 y.o. woman with stage IV NSCLC, adenocarcinoma. Negative for EGFR  And ALK-1. We also checked PD-L1 and she is strongly positive.  On strata testing she has BRA [id number] mutation.  November 2017 PET scan suggestive of liver metastases as well as mediastinal lymph node metastases.  Currently on dabrafenib and Trimetinib that she started the second week of December 2017.  2. Initially had 4 cycles of carboplatin, taxol and avastin. Then was on Alimta and Avastin.  Later on she has had repeat treatment with 4 cycles Carboplatin and Taxol. PET scan showed good response.  Started on Pembrolizumab but after 4 cycles there was some progression.  She then tried and progressed on Opdivo.  Most Recently she was on Abraxane from November 2016 until April 2017. PET scan showed complete response. Stopped due to side effects. Her May 2017 PET scan showed progression.  She was on Atezulizumab since end of May beginning of October 2017. PET scan in November 2017 showing worsening of the liver metastases.   3. Gastric ulcer pathology is consistent with non-small cell lung cancer.  Clinically she seems to be doing well on that her hemoglobin is stable and she has not had any more blood in her stools.  4. Elevated LFT, mostly Alk Phos which now appears may be due to progression of her disease.  5. New diagnosis of DVT for which she is on anticoagulation.  6. Anxiety and depression.  7. Recent bleeding from submucosal ulcer in her mouth.  8. 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    Plan     1. At this time continue treatment with  dabrafenib and trimatinib.  If she feels like it she can split the dosing rather than taking at the same time.  I reassured them that I doubt that she has blindness coming from those drugs.  If however she is concerned she should see the ophthalmologist.  2. RTC in 6 weeks with a PET scan.  3. Continue anticoagulation.  4. Call if she has any other new symptoms.    Clinical stage      Lung cancer, Right side    Primary site: Lung (Right)    Staging method: AJCC 7th Edition    Clinical: Stage IV (T4, N0, M1a) signed by Jemal Evans MD on 10/20/2014  2:19 PM    Summary: Stage IV (T4, N0, M1a)      History     Blanca Oreilly is a very pleasant 84 y.o. old female with a history of  non-small cell lung cancer located in the right lung measuring 5.7 cm in size presenting with some shortness of breath associated with malignant pleural effusion in the month of October 2014.  Pleural tap was positive for malignant cells adenocarcinoma in nature.  CK 7 positive CK 20 negative TTF-1 positive.  Subsequently that she was admitted due to worsening shortness of breath and her pleural effusion was tapped . She had Pleurx catheter placed but then removed as pleural fluid has reduced to few cc per week.   Her PET scan done at [facility] showed disease limited to thorax only. She has started on chemotherapy with carboplatin and Taxol with avastin. Has had 4 doses. Her PET scan showed nearly complete response. So we started her on Alimta and Avastin. She started that in February 2015.    She had CT in September 2015 which showed some worsening of subcrinal LN. Then had pet scan. That shows progression. Started back on Carboplatin and Taxol. We did give her Avastin but then we had to hold it due to protienuria. CT scan after 2 cycles showed improvement. PET scan after 4 showed partial response. She was tested for PD-L1 and was positive for both opdivo and Pembrolizumab. So we started on Pembrolizumab in late January 2016.  However PET  scan after 3 cycles showed progression. So in April we started on Opdivo.    Was in hospital in the beginning of September 2016 with GI bleed and was found to have gastric ulcer.  She was started on PPI. Coumadin stopped.  In October 2016 her PET scan  showed progression.  Opdivo has been stopped.  Her PET scan also showed some fullness on her kidney.  Her creatinine had gone up.  She was then admitted and started on some IV fluids and seen by nephrology.  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  High doses of steroids actually give her diabetes.  In the meantime she also lost her  was battling myelodysplastic syndrome.  She had a follow-up endoscopy for the gastric ulcer and that came back positive for adenocarcinoma consistent with lung primary.  She has been started on salvage chemotherapy with single agent Abraxane.  She had 1 cycle in November.  She is here for her next cycle.    She is managing her diabetes well.  Dr. Mayo started her on insulin.  Her blood sugars are getting better.    Tolerating abraxane well. Blood sugars ok.  The scan after 3 cycles showed significant improvement in the mediastinal as well as abdominal lymphadenopathy.  She is not having any more GI bleeding.  Her hemoglobin is going up.  She did have an abnormal stress test for which she ended up being in the hospital.  A reversible ischemic defect was noted.  She has been put on nitrates.  She is otherwise doing well.  She has not had any EGD since starting her chemotherapy.  She had PET scan in March which actually showed complete response.    She continued Abraxane however her neuropathy seems to have gotten worse.  We stopped treatment in April 2017.     PET scan in May 2017 showed progression. I recommended trying Tecentriq which she started end of May 2017.   PET scan in August 2017 showed partial response.  However in  late September 2017 she started to have elevation of her liver function test which required me to stop her treatment.  In spite of high dose of prednisone her liver function test continue to get worse.  She had PET scan done November 2017 which showed that she had developed new liver metastases.  Due to the fact that her tumor molecular profiling on strata testing showed that she had BRAF   mutation I prescribed dabrafenib and Trimetinib try imatinib.  She started those medications sometime in the first of the second week of December 2017.    She was diagnosed to have a DVT in her left calf on December 7, 2017.  She has been started on anticoagulation.  She was in the emergency room with some buccal mucosal bleeding last week.  She also had an episode of momentarily blindness that lasted few seconds.  She is concerned that dabrafenib and Trimetinib might be causing that.    Past Medical History     Past Medical History:   Diagnosis Date     A-fib      Anemia      Cancer of lung      Carotid stenosis      Chronic kidney disease      CKD (chronic kidney disease)      Coronary artery disease      Diabetes mellitus 11/1/2016     Diabetes mellitus, type II      Exacerbation of asthma 4/4/2007     High cholesterol      HTN (hypertension)      Hyperlipidemia      Hypertension      Peripheral neuropathy      Pleural effusion      Primary lung adenocarcinoma     Stage IV NSCLC     Upper GI bleed      Review of Systems   Constitutional  Constitutional (WDL): Exceptions to WDL  Fatigue: Fatigue not relieved by rest - Limiting instrumental ADLDenies any complaints.  Neurosensory  Neurosensory (WDL): Exceptions to WDL  Peripheral Motor Neuropathy: Asymptomatic, clinical or diagnostic observations only, intervention not indicated  Ataxia: Moderate symptoms, limiting instrumental ADL  Peripheral Sensory Neuropathy: Moderate symptoms, limiting instrumental ADL (feet and hands)  Cardiovascular  Cardiovascular (WDL): All  cardiovascular elements are within defined limits  Pulmonary     Gastrointestinal  Gastrointestinal (WDL): All gastrointestinal elements are within defined limits  Genitourinary  Genitourinary (WDL): All genitourinary elements are within defined limits (urgent)  Integumentary  Integumentary (WDL): All integumentary elements are within defined limits (brusing)  Patient Coping  Patient Coping: Accepting  ECOG Performance   1  Pain Status  Currently in Pain: Yes  Accompanied by  Accompanied by: Family Member      Vital Signs     Vitals:    12/18/17 1251   BP: 139/63   Pulse: 77   Temp: 98.7  F (37.1  C)   SpO2: 98%       Physical Exam     GENERAL: no acute distress. Cooperative in conversation.   HEENT: Starting to have alopecia. pupils are equal, round and reactive. Oral mucosa is clean and intact. No ulcerations or mucositis noted. No bleeding noted.  RESP:Chest symmetric lungs are clear bilaterally per auscultation. Regular respiratory rate. No wheezes or rhonchi.    CV: Normal S1 S2 Regular, rate and rhythm. No murmurs.  ABD: Nondistended, soft, slight epigastric tenderness. Positive bowel sounds. No organomegaly.   EXTREMITIES: No lower extremity edema.  Noticing slight increased edema around the lower part of her ankles.  NEURO: non focal. Alert and oriented x3. Slight weakness on the right side of her face.  She has some trouble walking.   PSYCH: within normal limits. No depression or anxiety.  SKIN: warm dry intact .      Lab Results     Results for orders placed or performed in visit on 12/18/17   Comprehensive Metabolic Panel   Result Value Ref Range    Sodium 135 (L) 136 - 145 mmol/L    Potassium 4.2 3.5 - 5.0 mmol/L    Chloride 102 98 - 107 mmol/L    CO2 24 22 - 31 mmol/L    Anion Gap, Calculation 9 5 - 18 mmol/L    Glucose 231 (H) 70 - 125 mg/dL    BUN 40 (H) 8 - 28 mg/dL    Creatinine 1.30 (H) 0.60 - 1.10 mg/dL    GFR MDRD Af Amer 47 (L) >60 mL/min/1.73m2    GFR MDRD Non Af Amer 39 (L) >60 mL/min/1.73m2     Bilirubin, Total 0.5 0.0 - 1.0 mg/dL    Calcium 9.1 8.5 - 10.5 mg/dL    Protein, Total 5.8 (L) 6.0 - 8.0 g/dL    Albumin 3.0 (L) 3.5 - 5.0 g/dL    Alkaline Phosphatase 129 (H) 45 - 120 U/L    AST 35 0 - 40 U/L    ALT 40 0 - 45 U/L   HM1 (CBC with Diff)   Result Value Ref Range    WBC 7.4 4.0 - 11.0 thou/uL    RBC 3.45 (L) 3.80 - 5.40 mill/uL    Hemoglobin 10.7 (L) 12.0 - 16.0 g/dL    Hematocrit 33.4 (L) 35.0 - 47.0 %    MCV 97 80 - 100 fL    MCH 31.0 27.0 - 34.0 pg    MCHC 32.0 32.0 - 36.0 g/dL    RDW 20.6 (H) 11.0 - 14.5 %    Platelets 106 (L) 140 - 440 thou/uL    MPV 11.0 8.5 - 12.5 fL    Neutrophils % 82 (H) 50 - 70 %    Lymphocytes % 10 (L) 20 - 40 %    Monocytes % 7 2 - 10 %    Eosinophils % 0 0 - 6 %    Basophils % 1 0 - 2 %    Neutrophils Absolute 6.0 2.0 - 7.7 thou/uL    Lymphocytes Absolute 0.8 0.8 - 4.4 thou/uL    Monocytes Absolute 0.5 0.0 - 0.9 thou/uL    Eosinophils Absolute 0.0 0.0 - 0.4 thou/uL    Basophils Absolute 0.0 0.0 - 0.2 thou/uL         Distress assessment and ECOG status      ECOG Performance:    ECOG Performance Status: 2    Distress Assessment  Distress Assessment Score: Extreme distress (new medication)       Imaging Results       Us Venous Leg Left    Result Date: 12/7/2017  US VENOUS LEG LEFT 12/7/2017 4:47 PM INDICATION: Left leg edema. TECHNIQUE: Routine exam without and with compression, augmentation, and duplex utilizing 2D gray-scale imaging, Doppler interrogation with color-flow and spectral waveform analysis. COMPARISON: 10/22/2014 leg duplex. FINDINGS: The common femoral, femoral, popliteal, and segmentally visualized calf veins were evaluated. The opposite CFV was also included in the evaluation. The left popliteal vein has nonocclusive thrombus which extend into a posterior tibial vein where becomes occlusive to the mid calf. The common femoral, femoral, and posterior tibial veins at the ankle appear normal area. Small Baker's cyst measuring 2 x 1.9 x 0.5 cm.      CONCLUSION: 1.  Left knee and calf DVT. 2.  Report called to Dr. Garcia at 5:00 PM.          Signed by: Jemal Evans MD

## 2021-06-15 NOTE — PROGRESS NOTES
ASSESSMENT:  There are no diagnoses linked to this encounter.    1.  Anticoagulation due to deep venous thrombosis: Blanca was recently changed from Lovenox to warfarin.  She is concerned about bleeding risk.  This is a concern since she previously had GI bleeding related to a metastasis to the stomach.  Risk-benefit ratio of treatment was discussed.  I would favor the trial on warfarin.  She will be closely monitored for evidence of GI bleeding and unexplained anemia.    2.  Type 2 diabetes mellitus: Home blood sugar readings were reviewed.  Overall her control has been good aside from occasional high readings in the p.m.  It was emphasized that avoidance of hypoglycemia takes precedence over extremely tight diabetic control.    3.  Labile hypertension: Control overall is acceptable    4.  Metastatic adenocarcinoma of the lung: Followed closely by Dr. Evans.  She has not had recent chemotherapy.  Last PET scan looked good    PLAN:  1.  Agree with plans for warfarin  2.  Continue current insulin regimen  3.  Follow-up 3 months or as needed      There are no discontinued medications.      ASSESSED PROBLEMS:  No diagnosis found.    CHIEF COMPLAINT:  Chief Complaint   Patient presents with     Medication Questions     about Warfrin and callin in numbers       HISTORY OF PRESENT ILLNESS:  Blanca is a 84 y.o. female presenting to the clinic today to address her medication management.     DVT: She states that Lovenox was $900 last month with her present insurance and is quite painful.     Malignant neoplasm of upper lobe bronchus, right: She states she had not had chemo in months.     REVIEW OF SYSTEMS:   All other systems are negative.    PFSH:  Pertinent history reviewed.     TOBACCO USE:  History   Smoking Status     Former Smoker     Packs/day: 0.10     Years: 30.00     Quit date: 10/30/1980   Smokeless Tobacco     Never Used     Comment: Not a steady smoker       VITALS:  Vitals:    02/05/18 1456   BP: 160/80   Pulse:  "76   Weight: 109 lb (49.4 kg)     Wt Readings from Last 3 Encounters:   02/05/18 109 lb (49.4 kg)   01/31/18 109 lb 8 oz (49.7 kg)   01/29/18 105 lb (47.6 kg)     Body mass index is 21.29 kg/(m^2).    PHYSICAL EXAM:  Constitutional:   Reveals an alert, pleasant, slender, slightly pale woman.  Vitals: per nursing notes.  Detailed exam not done.     QUALITY MEASURES:    ADDITIONAL HISTORY SUMMARIZED (2): Reviewed note from Dr. Evans, 01/31/2018, PET showed disease limited to thorax. Stage IV.   DECISION TO OBTAIN EXTRA INFORMATION (1): None   RADIOLOGY TESTS (1): Reviewed CT PET, favorable.   LABS (1): Reviewed labs, INR, A1c.   MEDICINE TESTS (1): None  INDEPENDENT REVIEW (2 each): None     Total data points: 4.     The visit lasted a total of 17 minutes face to face with the patient. Over 50% of the time was spent counseling and educating the patient about DVT, malignant neoplasm of upper lobe bronchus.    ISilva, am scribing for and in the presence of, Dr. Garcia.    I, Dr. Garcia, personally performed the services described in this documentation, as scribed by Silva Paulino in my presence, and it is both accurate and complete.    Dragon dictation was used for this note.  Speech recognition errors are a possibility.    MEDICATIONS:  Current Outpatient Prescriptions   Medication Sig Dispense Refill     amLODIPine (NORVASC) 5 MG tablet Use twice daily.   Hold for BP under 110 systolic 180 tablet 3     atorvastatin (LIPITOR) 40 MG tablet Take 1 tablet (40 mg total) by mouth daily. 90 tablet 3     BD ULTRA-FINE JOHN PEN NEEDLES 32 gauge x 5/32\" Ndle USE 4 TIMES DAILY AS DIRECTED WITH INSULIN 100 each 5     blood glucose test (CONTOUR NEXT STRIPS) strips Use 1 each As Directed 5 (five) times a day. Dispense brand per patient's insurance at pharmacy discretion. 450 strip 7     coenzyme Q10 (CO Q-10) 10 mg capsule Take 1 capsule by mouth daily.        enoxaparin (LOVENOX) 60 mg/0.6 mL syringe Inject 0.6 mL " (60 mg total) under the skin every 12 (twelve) hours. 60 Syringe 0     HYDROcodone-acetaminophen 5-325 mg per tablet Take 1-2 tablets by mouth every 6 (six) hours as needed for pain. 30 tablet 0     INSULIN ASPART (NOVOLOG FLEXPEN SUBQ) Inject under the skin as needed. Dose per sliding scale       insulin glargine (LANTUS) 100 unit/mL injection Inject 6 Units under the skin at bedtime.       isosorbide mononitrate (IMDUR) 30 MG 24 hr tablet Take 1 tablet (30 mg total) by mouth daily. 90 tablet 3     loperamide (IMODIUM) 2 mg capsule Take 2 mg by mouth 4 (four) times a day as needed for diarrhea.       magnesium 250 mg Tab Take 500 mg by mouth daily.        metoprolol succinate (TOPROL-XL) 50 MG 24 hr tablet Take 1.5 tablets (75 mg total) by mouth at bedtime. 135 tablet 3     multivitamin (ONE A DAY) per tablet Take 1 tablet by mouth daily.       nitroglycerin (NITROSTAT) 0.4 MG SL tablet Place 1 tablet (0.4 mg total) under the tongue every 5 (five) minutes as needed for chest pain. 10 tablet 1     ranitidine (ZANTAC) 150 MG tablet Take 150 mg by mouth 2 (two) times a day.       warfarin (COUMADIN) 1 MG tablet Take 1 tablet (1 mg total) by mouth See Admin Instructions. Adjust dose based on INR results as directed. 60 tablet 11     warfarin (COUMADIN) 4 MG tablet Take 1 tablet (4 mg total) by mouth See Admin Instructions. Take one tablet by mouth daily. Adjust dose based on INR results as directed. 30 tablet 11     No current facility-administered medications for this visit.

## 2021-06-15 NOTE — PROGRESS NOTES
MTM Initial Encounter  ASSESSMENT AND PLAN  DVT/Afib: INR today not at goal. Per heme/onc, warfarin increased to 5 mg daily. Reviewed that typically Lovenox is not d/c'ed until INR is at 2, therefore I would recommend continuing Lovenox. She would like to discuss with Dr. Garcia today about whether he thinks warfarin is safe for her considering her history of bleeds, therefore I will defer to him on whether to continue warfarin.     Type 2 Diabetes:  well controlled. A1C at goal of <8%. FBP at goal  mg/dL, therefore would recommend continuing 6 units. Patients few values in the 200-300s are rare and she attributes to carbs. Discussed the possibility of adding a unit if she has a carb, but she is worried about dropping low. She plans on eating more, therefore will observe her values at follow up to see if more Novolog is needed. She is overall correcting herself well, as before meals she is often well controlled. Therefore we opted to continue her current her current regimen.  Discussed checking two hours post prandial once in a while, such as after dinner.   I was unable to add an A1c to her INR that was completed today, but encouraged her to let lab know when she has her next INR done (future order placed)    Malignant neoplasm of upper lobe bronchus: Continue to follow with Dr. Evans.    Hypertension: BP was slightly elevated today and not at goal <140/90 mmHg per JNC8. BP was rechecked at Dr. Garcia appointment -- she notes that her BP is always high in clinic. Recommend continued monitoring. Recommended patient bring her home BP values to future appointment.     CAD: Stable. Continue current regimen.     GERD: Stable. Agree to remain off PPIs due to possible CKD secondary to PPI.     Supplements: Ok to continue current supplements.     MTM FOLLOW UP  2/26/18 (patient is going to [place] on 2/28/18)    SUBJECTIVE AND OBJECTIVE  Blanca Oreilly is a 84 y.o. female here for an initial medication therapy  "management (MTM) appointment. Referred from Dr. Garcia for diabetes management, insulin adjustment. Her daughter joins us today. Her daughter joins us today.     DVT/Afib: Left leg on 12/7/17, was started on Lovenox 60 mg BID at the time, but warfarin was not started until 1/31/18 by Dr Evans. She is no longer on aspirin due to acute blood loss anemia. Reports that she was on warfarin in the past for afib and had significant bleeding (submucosal mouth ulcer) and was told to never be on warfarin again. Was told to stop Lovenox when warfarin was started per Dr. Evans. Dr. Perez told her to never go back on warfarin. She would like to discuss this with Dr. Garcia today. Per chart review, warfarin was originally d/c'ed by Dr. Evans 9/6/16 due to GI bleed from gastric ulcer.   INR today = 1.39    Type 2 Diabetes: Currently taking Lantus 6 units daily and Novolog correction scale:   <150 0 units  150-200 4 units   201-250 6 units   >250 8 units  Tests BG 3-4 times daily. Reports when BG are 200-300 it is because she ate a piece of bread. Blood sugars per glucometer:     FBG/ 2hours post Before lunch Before dinner Bedtime   ?106 ?181 ?197 ?291   ?122 ?132 ?310 ?149   ?131 ?161 ?207 ?134   ?126 ?185 ?111 ?164   ?109 ?213 ?157 ?   ?141 ?178 ?189 ?154       147    161     150    ?140 ?200 ? ?   Last A1c checked 7.8% = 9/22/17  No longer on steroids.   Hypoglycemia none she reports  Microalbumin checked none on file  Is on atorvastatin 40 mg daily. Last lipids checked 1/16/17  Breakfast = a little oatmeal. One large egg. Half sausage  Lunch and dinner = vegetables and protein - stir yost.   Very rarely snacks in between  Daughter feels like she needs to eat more    Malignant neoplasm of upper lobe bronchus: Follows with Dr. Evans. She feels better and is eating more.     Hypertension: Currently taking metoprolol succinate 50 mg daily. Amlodipine is on hold because she BP is \"wild.\" Was having hypotension. Reports that she " will take amlodipine if her AM BP reading is above >160 mmHg systolic and will take an extra 25 mg of metoprolol if her evening BP is >160 mmHg systolic.     CAD: Currently taking isosorbide mononitrate 30 mg daily. Has nitro at home - recently refilled. Denies recent chest pain, but has had chest pain in the past. Reports that she has not needed nitro recently.     GERD: Currently taking ranitidine 150 mg BID. Reports that it was started a long time ago. Denies any heartburn or acid reflux. Per [facility] reports, acute interstitial nephritits (no biopsy) thought to be due to Protonix. Was told to avoid PPIs in the future.     Supplements: Currently taking CoQ10 (dose unknown), magnesium (dose unknown), and a MVI.   Last magnesium level = 1.9 on 9/7/16            Blanca's medication list was reviewed with them, discussing reason for use, directions for use, and potential side effects of each medication as needed. Indication, safety, efficacy, and convenience was assessed for all medications addressed above.  No environmental factors were noted currently affecting patient.  This care plan was communicated via EMR with her primary care provider, Marco Garcia MD, who is the authorizing prescriber for this visit.  Direct supervision was available by either the patient's PCP or other available provider.    Time and complexity billing metrics are included in the docflowsheet linked to this visit    Time spent: 60 min  Pamella Earl, Pharm.D., BCACP  MTM Pharmacist at [facility] and [facility]

## 2021-06-15 NOTE — PROGRESS NOTES
[facility] Hematology and Oncology Progress Note    Patient: Blanca Oreilly  MRN: [id number]  Date of Service: 1/31/2018           Reason for visit      1. Malignant neoplasm of upper lobe bronchus, right, PD-L1 and [id number] mutation positive.    2. Paroxysmal atrial fibrillation with rapid ventricular response    NSCLC; EGFR and ALK-1 negative. PD-L1 strongly positive for both 22C3, 28-8    Assessment     1. Very pleasant 84 y.o. woman with stage IV NSCLC, adenocarcinoma. Negative for EGFR  And ALK-1. We also checked PD-L1 and she is strongly positive.  On strata testing she has BRA MA270M mutation.  November 2017 PET scan suggestive of liver metastases as well as mediastinal lymph node metastases.  She was prescribed and took few days of dabrafenib and Trimetinib in the second week of December 2017.  Stopped after few days.  Surprisingly her PET scan shows very good response.  2. prior treatment include 4 cycles of carboplatin, taxol and avastin. Then was on Alimta and Avastin.  Later on she has had repeat treatment with 4 cycles Carboplatin and Taxol. PET scan showed good response.  Started on Pembrolizumab but after 4 cycles there was some progression.  She then tried and progressed on Opdivo.  Most Recently she was on Abraxane from November 2016 until April 2017. PET scan showed complete response. Stopped due to side effects. Her May 2017 PET scan showed progression.  She was on Atezulizumab from end of May beginning of October 2017. PET scan in November 2017 showed worsening of the liver metastases.   3. Gastric ulcer pathology is consistent with non-small cell lung cancer.  Clinically she seems to be doing well on that her hemoglobin is stable and she has not had any more blood in her stools.  4. Elevated LFT, mostly Alk Phos which now appears may be due to progression of her disease.  5. New diagnosis of DVT for which she is on anticoagulation.  6. Anxiety and depression.  7. Recent bleeding from  submucosal ulcer in her mouth.  8. 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    Plan     1. At this time I would recommend that she continue observation.  I did talk to her to see if she would be willing to continue treatment with dabrafenib and trimatinib even at lower doses.  At this time she is not sure she wants to do that.  2. RTC in 6-8 weeks with a CT scan.  3. Continue anticoagulation.  4. Call if she has any other new symptoms.    Clinical stage      Lung cancer, Right side    Primary site: Lung (Right)    Staging method: AJCC 7th Edition    Clinical: Stage IV (T4, N0, M1a) signed by Jemal Evans MD on 10/20/2014  2:19 PM    Summary: Stage IV (T4, N0, M1a)      History     Blanca Oreilly is a very pleasant 84 y.o. old female with a history of  non-small cell lung cancer located in the right lung measuring 5.7 cm in size presenting with some shortness of breath associated with malignant pleural effusion in the month of October 2014.  Pleural tap was positive for malignant cells adenocarcinoma in nature.  CK 7 positive CK 20 negative TTF-1 positive.  Subsequently that she was admitted due to worsening shortness of breath and her pleural effusion was tapped . She had Pleurx catheter placed but then removed as pleural fluid has reduced to few cc per week.   Her [facility] done at [facility] showed disease limited to thorax only. She has started on chemotherapy with carboplatin and Taxol with avastin. Has had 4 doses. Her PET scan showed nearly complete response. So we started her on Alimta and Avastin. She started that in February 2015.    She had CT in September 2015 which showed some worsening of subcrinal LN. Then had pet scan. That shows progression. Started back on Carboplatin and Taxol. We did give her Avastin but then we had to hold it due to protienuria. CT scan after 2 cycles showed improvement. PET scan after 4 showed partial response. She was tested for PD-L1  and was positive for both opdivo and Pembrolizumab. So we started on Pembrolizumab in late January 2016.  However PET scan after 3 cycles showed progression. So in April we started on Opdivo.    Was in hospital in the beginning of September 2016 with GI bleed and was found to have gastric ulcer.  She was started on PPI. Coumadin stopped.  In October 2016 her PET scan  showed progression.  Opdivo has been stopped.  Her PET scan also showed some fullness on her kidney.  Her creatinine had gone up.  She was then admitted and started on some IV fluids and seen by nephrology.  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  High doses of steroids actually give her diabetes.  In the meantime she also lost her  was battling myelodysplastic syndrome.  She had a follow-up endoscopy for the gastric ulcer and that came back positive for adenocarcinoma consistent with lung primary.  She has been started on salvage chemotherapy with single agent Abraxane.  She had 1 cycle in November.  She is here for her next cycle.    She is managing her diabetes well.  Dr. Mayo started her on insulin.  Her blood sugars are getting better.    Tolerating abraxane well. Blood sugars ok.  The scan after 3 cycles showed significant improvement in the mediastinal as well as abdominal lymphadenopathy.  She is not having any more GI bleeding.  Her hemoglobin is going up.  She did have an abnormal stress test for which she ended up being in the hospital.  A reversible ischemic defect was noted.  She has been put on nitrates.  She is otherwise doing well.  She has not had any EGD since starting her chemotherapy.  She had PET scan in March which actually showed complete response.    She continued Abraxane however her neuropathy seems to have gotten worse.  We stopped treatment in April 2017.     PET scan in May 2017 showed progression. I  recommended trying Tecentriq which she started end of May 2017.   PET scan in August 2017 showed partial response.  However in late September 2017 she started to have elevation of her liver function test which required me to stop her treatment.  In spite of high dose of prednisone her liver function test continue to get worse.  She had PET scan done November 2017 which showed that she had developed new liver metastases.      Due to the fact that her tumor molecular profiling on strata testing showed that she had BRAF   mutation I prescribed dabrafenib and Trimetinib.  She started those medications sometime in the first of the second week of December 2017. She was diagnosed to have a DVT in her left calf on December 7, 2017.  She has been started on anticoagulation.  She eventually was unable to handle those medications.  She has not taken anything since I believe 12 December 2017.    She has been feeling quite well.  Comes in today after a PET scan.    Past Medical History     Past Medical History:   Diagnosis Date     A-fib      Anemia      Cancer of lung      Carotid stenosis      Chronic kidney disease      CKD (chronic kidney disease)      Coronary artery disease      Diabetes mellitus 11/1/2016     Diabetes mellitus, type II      Exacerbation of asthma 4/4/2007     High cholesterol      HTN (hypertension)      Hyperlipidemia      Hypertension      Peripheral neuropathy      Pleural effusion      Primary lung adenocarcinoma     Stage IV NSCLC     Upper GI bleed      Review of Systems   Constitutional  Constitutional (WDL): Exceptions to WDL  Weight Gain: 5 - <10% from baseline (up 4 lbs)Denies any complaints.  Neurosensory  Neurosensory (WDL): Exceptions to WDL  Peripheral Sensory Neuropathy: Asymptomatic, loss of deep tendon reflexes or paresthesia (fingers)  Cardiovascular  Cardiovascular (WDL): Exceptions to WDL  Edema: Yes  Superficial thrombophlebitis: Present (left leg/on lovenox)  Pulmonary      Gastrointestinal  Gastrointestinal (WDL): All gastrointestinal elements are within defined limits  Genitourinary  Genitourinary (WDL): All genitourinary elements are within defined limits  Integumentary  Integumentary (WDL): All integumentary elements are within defined limits (some bruising from lovenox)  Patient Coping  Patient Coping: Accepting  ECOG Performance   1  Pain Status  Currently in Pain: No/denies  Accompanied by  Accompanied by: Family Member      Vital Signs     Vitals:    01/31/18 1414   BP: 149/63   Pulse: 67   Temp: 98.1  F (36.7  C)   SpO2: 97%       Physical Exam     GENERAL: no acute distress. Cooperative in conversation.   HEENT: Starting to have alopecia. pupils are equal, round and reactive. Oral mucosa is clean and intact. No ulcerations or mucositis noted. No bleeding noted.  RESP:Chest symmetric lungs are clear bilaterally per auscultation. Regular respiratory rate. No wheezes or rhonchi.    CV: Normal S1 S2 Regular, rate and rhythm.   ABD: Nondistended, soft, slight epigastric tenderness. Positive bowel sounds. No organomegaly.   EXTREMITIES: No lower extremity edema.  Noticing slight increased edema around the lower part of her ankles.  NEURO: non focal. Alert and oriented x3.  No obvious neuro deficit.  PSYCH: within normal limits. No depression or anxiety.  SKIN: warm dry intact .      Lab Results     Results for orders placed or performed in visit on 01/31/18   Comprehensive Metabolic Panel   Result Value Ref Range    Sodium 142 136 - 145 mmol/L    Potassium 4.0 3.5 - 5.0 mmol/L    Chloride 104 98 - 107 mmol/L    CO2 29 22 - 31 mmol/L    Anion Gap, Calculation 9 5 - 18 mmol/L    Glucose 111 70 - 125 mg/dL    BUN 27 8 - 28 mg/dL    Creatinine 1.64 (H) 0.60 - 1.10 mg/dL    GFR MDRD Af Amer 36 (L) >60 mL/min/1.73m2    GFR MDRD Non Af Amer 30 (L) >60 mL/min/1.73m2    Bilirubin, Total 0.3 0.0 - 1.0 mg/dL    Calcium 10.2 8.5 - 10.5 mg/dL    Protein, Total 6.4 6.0 - 8.0 g/dL    Albumin  3.5 3.5 - 5.0 g/dL    Alkaline Phosphatase 85 45 - 120 U/L    AST 20 0 - 40 U/L    ALT 27 0 - 45 U/L   HM1 (CBC with Diff)   Result Value Ref Range    WBC 7.9 4.0 - 11.0 thou/uL    RBC 3.98 3.80 - 5.40 mill/uL    Hemoglobin 12.6 12.0 - 16.0 g/dL    Hematocrit 40.0 35.0 - 47.0 %     (H) 80 - 100 fL    MCH 31.7 27.0 - 34.0 pg    MCHC 31.5 (L) 32.0 - 36.0 g/dL    RDW 14.6 (H) 11.0 - 14.5 %    Platelets 162 140 - 440 thou/uL    MPV 10.9 8.5 - 12.5 fL    Neutrophils % 68 50 - 70 %    Lymphocytes % 17 (L) 20 - 40 %    Monocytes % 11 (H) 2 - 10 %    Eosinophils % 3 0 - 6 %    Basophils % 1 0 - 2 %    Neutrophils Absolute 5.4 2.0 - 7.7 thou/uL    Lymphocytes Absolute 1.3 0.8 - 4.4 thou/uL    Monocytes Absolute 0.9 0.0 - 0.9 thou/uL    Eosinophils Absolute 0.3 0.0 - 0.4 thou/uL    Basophils Absolute 0.0 0.0 - 0.2 thou/uL         Distress assessment and ECOG status      ECOG Performance:    ECOG Performance Status: 0    Distress Assessment  Distress Assessment Score: Extreme distress       Imaging Results       Nm Pet Ct Skull To Mid Thigh    Result Date: 1/29/2018  [facility] PET FDG/CT 1/29/2018 10:43 AM INDICATION: Lung cancer, right, restaging. Interval chemotherapy. Subsequent treatment strategy. TECHNIQUE: Serum glucose level 106 mg/dL. One hour post intravenous administration of 9.7 mCi F-18 FDG, PET imaging was performed from the skull base to the mid thighs utilizing attenuation correction with concurrent axial CT and PET/CT image fusion. Dose reduction techniques were used. COMPARISON: PET/CT 11/10/2017 reviewed. FINDINGS: Existing sites of FDG avid thoracic adenopathy in the mediastinum, right perihilar and right anterior cardiophrenic regions have either resolved or substantially decreased in size and degree of uptake. Greatest degree of persistent uptake involves a right lower paratracheal station 4R node (SUVmax 5.2, previously 7.8). Residual uptake also involves prevascular station 3a and right  perihilar station 11R nodes. Existing FDG avid abdominal adenopathy has substantially decreased in size and degree of uptake. Some mild uptake remains in a smaller right aortocaval node (SUVmax 2.9, previously 10.0). Metastasis in the central liver has decreased in size and associated uptake has resolved to background level. Central intrahepatic bile duct dilatation has decreased. Prior gallbladder thickening has nearly resolved and associated surrounding linear uptake has normalized. Stable mildly FDG avid indeterminate right thyroid nodule; consider ultrasound and FNA if not previously performed. Moderate cerebral volume loss with associated ex vacuo ventricular dilatation. Moderate atherosclerotic calcifications. Marked right renal atrophy. Minimal scattered degenerative changes spine.     CONCLUSION: Favorable partial near complete treatment response of thoracic and upper abdominal antione and central liver metastases. A few areas of antione enlargement and uptake persist suggesting some residual active disease. No new metastasis.          Signed by: Jemal Evans MD

## 2021-06-15 NOTE — PROGRESS NOTES
ASSESSMENT: Onychauxis, diabetes with neurologic manifestations, foot pain.    PLAN: Toenails were debrided manually and mechanically x10. Return to clinic in nine weeks.         SUBJECTIVE: The patient returns to the [facility] for help with her toenails which are long, thick and painful. She has altered sensation in fingers and toes following chemotherapy treatments for cancer.  She is also diabetic. She denies any foot trauma.  Last clinic visit was November 13, 2017.    OBJECTIVE:  General: Pleasant 84 y.o. female in no acute distress.  Vascular: DP pulses are palpable. PT pulses are palpable. Pedal hair is diminished. Feet are cool to the touch.  Neuro: Sensation in the feet is altered. Patient describes paresthesias.  Derm: Toenails are elongated, thickened and dystrophic with discoloration and subungual debris. Skin is thin and shiny but intact.   Musculoskeletal: Hammertoes.

## 2021-06-16 NOTE — PROGRESS NOTES
Medication Therapy Management Follow Up Visit     ASSESSMENT AND PLAN  1. Type 2 diabetes mellitus without complication, with long-term current use of insulin  At goal A1c less than 8% per ADA guidelines, however is likely too tightly controlled.  From the few blood sugars that she brought in it is likely that her sliding scale is safe, however for meals when she does not require additional sliding scale insulin she was still benefit from medication to bring down her postprandial blood sugars.  As previously discussed at her last visit may consider switching her bolus insulin and to a GLP-1 agonist, which could be once weekly to simplify her regimen.  We could continue her basal insulin and adjust as we monitor her sugars.  Her blood sugars are rather labile at times, therefore a GLP-1 agonist may help even out her blood sugars and decrease risk of hypoglycemia.  At this time she feels well and she may consider discussing alternative regimens at follow-up.  -Consider switching bolus insulin to GLP-1 agonist    2. Essential hypertension with goal blood pressure less than 140/90  Not at goal blood pressure less than 140/90 per JNC 8.  However we calibrated her blood pressure cuff and it is accurate when compared to the office blood pressure cuff.  Per patient report her home blood pressures are generally around 120s systolic, however when she comes in the office there frequently very elevated.  Recommend continue monitoring blood pressures at home and continue current regimen.    FOLLOW-UP PLAN  Blanca was advised to follow up with PCP immediately after this visit.     SUBJECTIVE AND OBJECTIVE  Blanca Oreilly is a 84 y.o. female who was referred by Marco Garcia MD for MTM services.  Blanca's chief concern today is follow-up.  She presents today with her daughter.  We are mainly following up on her blood sugar readings that she was to report today after 1 month since our last visit.    Diabetes:  She only brought in 3 different blood sugar readings where she checked before and 2 hours after meals.  Unfortunately this is a very small sample.  One morning her fasting sugar was 117, which resulted in no sliding scale insulin being administered, and then her postprandial blood sugar was 271.  Another morning her blood sugar was at 180 and 2 hours later after some sliding scale insulin her blood sugar was 165.  Another morning her blood sugar was 251, and 2 hours later her blood sugar was 212.  Again with very few blood sugars is difficult to assess for patterns.  She did note that she has been eating more, however has a rather inconsistent diet.    Lab Results   Component Value Date    HGBA1C 5.9 02/12/2018     Hypertension: home blood pressure cuff here in office to calibrate. Nephrologist was concerned when her pressures were elevated in the office. She has been told she has white coat syndrome.     This note has been dictated using voice recognition software. Any grammatical or context distortions are unintentional and inherent to the software.    Blanca was provided follow up instructions, and this care plan was communicated via EMR with her primary care provider, Marco Garcia MD, and is the authorizing prescriber for this visit.  Direct supervision was available by either the patient's PCP or another available physician when needed.    Time spent: 30 minutes  Level of service: 2    Chauncey Zuñiga, PharmD, BCACP  Medication Management (MTM) Pharmacist  [facility]    We reviewed Blanca's medication list with them, discussing reason for use, directions for use, and potential side effects of each medication.  Indication, safety, efficacy, and convenience was assessed for all reviewed medications.  No environmental factors were noted currently affecting patient.    Current Outpatient Prescriptions   Medication Sig Dispense Refill     amLODIPine (NORVASC) 5 MG tablet On hold 180 tablet 3      "atorvastatin (LIPITOR) 40 MG tablet Take 1 tablet (40 mg total) by mouth daily. 90 tablet 3     BD ULTRA-FINE JOHN PEN NEEDLES 32 gauge x 5/32\" Ndle USE 4 TIMES DAILY AS DIRECTED WITH INSULIN 100 each 5     blood glucose test (CONTOUR NEXT STRIPS) strips Use 1 each As Directed 5 (five) times a day. Dispense brand per patient's insurance at pharmacy discretion. 450 strip 7     coenzyme Q10 (CO Q-10) 10 mg capsule Take 1 capsule by mouth daily.        GLUC LAMB/CHONDRO LAMB A/VIT C/MN (GLUCOSAMINE CHONDROITIN MAXSTR ORAL) Take by mouth.       HYDROcodone-acetaminophen 5-325 mg per tablet Take 1-2 tablets by mouth every 6 (six) hours as needed for pain. 30 tablet 0     INSULIN ASPART (NOVOLOG FLEXPEN SUBQ) Inject under the skin as needed. Dose per sliding scale       insulin glargine (LANTUS) 100 unit/mL injection Inject 6 Units under the skin at bedtime.       isosorbide mononitrate (IMDUR) 30 MG 24 hr tablet Take 1 tablet (30 mg total) by mouth daily. 90 tablet 3     loperamide (IMODIUM) 2 mg capsule Take 2 mg by mouth 4 (four) times a day as needed for diarrhea.       magnesium 250 mg Tab Take 500 mg by mouth daily.        metoprolol succinate (TOPROL-XL) 50 MG 24 hr tablet Take 1.5 tablets (75 mg total) by mouth at bedtime. 135 tablet 3     multivitamin (ONE A DAY) per tablet Take 1 tablet by mouth daily.       nitroglycerin (NITROSTAT) 0.4 MG SL tablet Place 1 tablet (0.4 mg total) under the tongue every 5 (five) minutes as needed for chest pain. 10 tablet 1     ranitidine (ZANTAC) 150 MG tablet Take 150 mg by mouth 2 (two) times a day.       warfarin (COUMADIN) 1 MG tablet Take 1 mg by mouth once daily in addition to 4 mg tablet (total 5 mg daily). Adjust dose based on INR results as directed. 60 tablet 11     warfarin (COUMADIN) 4 MG tablet Take 1 tablet (4 mg total) by mouth See Admin Instructions. Take one tablet by mouth daily. Adjust dose based on INR results as directed. 90 tablet 3     No current " facility-administered medications for this visit.

## 2021-06-16 NOTE — PROGRESS NOTES
INR reviewed with Kay CHANEL. Patient is to hold her coumadin x 3 days. She is to restart her coumadin at 4 mg daily. INR is to be rechecked on 4/02. [place] verbalized understanding and appreciation of our conversation.

## 2021-06-16 NOTE — PROGRESS NOTES
Medication Therapy Management Follow Up Visit     ASSESSMENT AND PLAN  1. Type 2 diabetes mellitus without complication, with long-term current use of insulin  At goal A1c less than 8% per ADA guidelines, however is likely to tightly controlled.  She has a high risk of falls and bleeding if she experiences hypoglycemia therefore spent a large proportion of the visit discussing alternative regimens or potential for decreasing insulin doses.  I have encouraged her to check postprandial blood sugars to assess current sliding scale regimen including efficacy and safety.  May consider decreasing intensity of sliding scale to decrease risk of hypoglycemia.  May also consider switching to a GLP-1 agonist which could be a once weekly injection in addition to some basal insulin if needed.  This may simplify her regimen and also decrease risk of hypoglycemia.  Will monitor blood sugars after 1 month, and take that information to consider alternative regimens that may be slightly safer and or more convenient.  -Educated to monitor postprandial sugars  -Consider decreasing intensity of sliding scale  -Consider switching bolus insulin to GLP-1 agonist    2. DVT (deep venous thrombosis)/Afib  INR today per anticoagulation team, refilled her warfarin with current tablet dose for 90 day supply for convenience.  -Refilled warfarin (COUMADIN) 4 MG tablet; Take 1 tablet (4 mg total) by mouth See Admin Instructions. Take one tablet by mouth daily. Adjust dose based on INR results as directed.  Dispense: 90 tablet; Refill: 3    3. Essential hypertension with goal blood pressure less than 140/90  At goal blood pressure less than 140/90 per JNC 8.  Encouraged her to continue monitoring her blood pressures.  Although I believe some of the self adjustments that she is making with her blood pressure medications may be impacting some inconsistencies in her home pressure readings, she is adamant to continue current  regimen.  Recommend continue at this time.    FOLLOW-UP PLAN  Blanca was advised to follow up in one month.    The following instructions were given:   Patient Instructions   INR today     Start checking sugars two hours after a meal to reassess sliding scale safety      Follow up in one month to discuss sugars and potential adjustments             SUBJECTIVE AND OBJECTIVE  Blanca Oreilly is a 84 y.o. female who was referred by Marco Garcia MD for MT services.  Blanca's chief concern today is follow-up.  She presents today with her daughter.  Her biggest concern continues to be risk of bleeding while on warfarin, she also had some traumatic experiences in the past with regards to her cancer, which are clearly very informative in how she perceives healthcare in particular facilities that she has been to.    Diabetes: will have more common episodes of hypoglycemia recently.  Based on her most recent A1c she is very tightly controlled with regards to her diabetes.  She is on 6 units of Lantus and sliding scale NovoLog.  Generally she is administering more NovoLog per day than Lantus.  She will have symptoms of hypoglycemia generally when her blood sugars drop below 100.  She has not checked any blood sugars 2 hours after eating as discussed at previous visit with the pharmacist.  Her blood sugars are consistent with what was reported at last visit, with rare blood sugars over 200.   Novolog correction scale:   <150 0 units  150-200 4 units   201-250 6 units   >250 8 units    DVT: Scared to be on warfarin. Last time she had blood loss there were no s/sx active bleeding. Currently taking warfarin 4.5mg daily.  Denies signs or symptoms of excessive bruising or bleeding.  She is getting regular lab draws from her oncologist to monitor her blood levels.  She is due to get her INR drawn today, she follows with anticoagulation program.    Hypertension: not taking amlodipine, taking metoprolol 1-1.5 tabs metoprolol at night.  "Only 1-2 nights per week would she take 1.5 tabs of metoprolol. She has not used amlodipine since chemo.     This note has been dictated using voice recognition software. Any grammatical or context distortions are unintentional and inherent to the software.    Blanca was provided follow up instructions, and this care plan was communicated via EMR with her primary care provider, Marco Garcia MD, and is the authorizing prescriber for this visit.  Direct supervision was available by either the patient's PCP or another available physician when needed.    Time spent: 60 minutes  Level of service: 3    Chauncey Zuñiga, PharmD, BCACP  Medication Management (MTM) Pharmacist  [facility]    We reviewed Blanca's medication list with them, discussing reason for use, directions for use, and potential side effects of each medication.  Indication, safety, efficacy, and convenience was assessed for all reviewed medications.  No environmental factors were noted currently affecting patient.    Current Outpatient Prescriptions   Medication Sig Dispense Refill     amLODIPine (NORVASC) 5 MG tablet On hold 180 tablet 3     atorvastatin (LIPITOR) 40 MG tablet Take 1 tablet (40 mg total) by mouth daily. 90 tablet 3     BD ULTRA-FINE JOHN PEN NEEDLES 32 gauge x 5/32\" Ndle USE 4 TIMES DAILY AS DIRECTED WITH INSULIN 100 each 5     blood glucose test (CONTOUR NEXT STRIPS) strips Use 1 each As Directed 5 (five) times a day. Dispense brand per patient's insurance at pharmacy discretion. 450 strip 7     coenzyme Q10 (CO Q-10) 10 mg capsule Take 1 capsule by mouth daily.        HYDROcodone-acetaminophen 5-325 mg per tablet Take 1-2 tablets by mouth every 6 (six) hours as needed for pain. 30 tablet 0     INSULIN ASPART (NOVOLOG FLEXPEN SUBQ) Inject under the skin as needed. Dose per sliding scale       insulin glargine (LANTUS) 100 unit/mL injection Inject 6 Units under the skin at bedtime.       isosorbide mononitrate (IMDUR) 30 MG 24 " hr tablet Take 1 tablet (30 mg total) by mouth daily. 90 tablet 3     loperamide (IMODIUM) 2 mg capsule Take 2 mg by mouth 4 (four) times a day as needed for diarrhea.       magnesium 250 mg Tab Take 500 mg by mouth daily.        metoprolol succinate (TOPROL-XL) 50 MG 24 hr tablet Take 1.5 tablets (75 mg total) by mouth at bedtime. 135 tablet 3     multivitamin (ONE A DAY) per tablet Take 1 tablet by mouth daily.       nitroglycerin (NITROSTAT) 0.4 MG SL tablet Place 1 tablet (0.4 mg total) under the tongue every 5 (five) minutes as needed for chest pain. 10 tablet 1     ranitidine (ZANTAC) 150 MG tablet Take 150 mg by mouth 2 (two) times a day.       warfarin (COUMADIN) 1 MG tablet Take 1 mg by mouth once daily in addition to 4 mg tablet (total 5 mg daily). Adjust dose based on INR results as directed. 60 tablet 11     warfarin (COUMADIN) 4 MG tablet Take 1 tablet (4 mg total) by mouth See Admin Instructions. Take one tablet by mouth daily. Adjust dose based on INR results as directed. 90 tablet 3     No current facility-administered medications for this visit.

## 2021-06-16 NOTE — TELEPHONE ENCOUNTER
Telephone Encounter by Ally Miner at 7/17/2019  3:14 PM     Author: Ally Miner Service: -- Author Type: --    Filed: 7/17/2019  3:16 PM Encounter Date: 7/14/2019 Status: Signed    : Ally Miner       PRIOR AUTHORIZATION DENIED    Denial Rational: Medicare Part D does not cover diabetic testing supplies.  All testing supplies are processed through medical benefits (part B).  Per pharmacy Medicare Part B will cover this drug but only allows testing up to three times daily for insulin dependent patients.  A new Rx will need to be sent to the pharmacy for 3 times daily testing instead of 5 times daily.  No PA can be done, per medicare Part B rules they only allow up to 3 times daily testing.   Please call pharmacy if there are any further questions #[phone]

## 2021-06-16 NOTE — PROGRESS NOTES
ASSESSMENT:  1. Hypercholesteremia  She does have a history of ischemic heart disease.  She is treated with Lipitor 40 mg daily with good tolerance.  - Lipid Cascade    2. Type 2 diabetes mellitus  She was seen by Chauncey Zuñiga earlier today.  Trulicity was advised as an alternate to insulin since blood sugars are quite labile.  This would have a lower risk for hypoglycemia.  She would need to monitor to make sure it does not contribute to problems with anorexia.  She was advised to check insurance coverage  - dulaglutide 0.75 mg/0.5 mL PnIj; Inject 0.75 mg under the skin once a week.  Dispense: 4 Syringe; Refill: 11    3. 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  Followed by Dr. Evans.  Not on chemotherapy currently  - INR    4. Paroxysmal atrial fibrillation with rapid ventricular response  No problems with symptomatic palpitations.  INR will be checked  - INR    5.  History of iron deficiency anemia due to GI blood loss  Hemogram will be monitored    6.  Deep venous thrombosis left leg 12/17: On warfarin.  No current problems with leg swelling or pain    PLAN:  1.  Labs as outlined.  She will be notified of test results  2.  Check insurance coverage for [facility].  She could try this as an alternate to insulin if interested.  3.  Continue other medications at current doses with clinic follow-up in 3 months    Orders Placed This Encounter   Procedures     Lipid Cascade     Order Specific Question:   Fasting is required?     Answer:   Unknown     There are no discontinued medications.    No Follow-up on file.    ASSESSED PROBLEMS:  1. Hypercholesteremia  Lipid Cascade   2. Type 2 diabetes mellitus  dulaglutide 0.75 mg/0.5 mL PnIj   3. Malignant neoplasm of upper lobe bronchus, right, PD-L1 and [id number] mutation positive.  INR   4. Paroxysmal atrial fibrillation with rapid ventricular response  INR       CHIEF COMPLAINT:  Chief Complaint   Patient presents with      "medication check     Diabetes       HISTORY OF PRESENT ILLNESS:  Blanca is a 84 y.o. female presenting to the clinic today for medication management.     Abdominal Discomfort: She has some discomfort at her waist occasionally. The first time she has this pain was when cancer moved into her liver. She feels fine and her appetite is increasing.     Chest Pain: She sometimes has chest pain. It can vary from barely noticeable to quite significant. She generally hopes it goes away, and does not feel it has gotten worse. She is more active now than in the past. She wonders if a person can feel better as cancer progresses.     Hypertension: She wonders if she has white coat hypertension. She is no longer taking warfarin.     Hypercalcemia: She does not drink any milk and wonders if her calcium level is OK. She states she occasionally has a bit of ice cream.     REVIEW OF SYSTEMS:   All other systems are negative.    PFSH:  Social: She was visiting her daughter in [place] and her granddaughter went to [facility]. She has a tan.    TOBACCO USE:  History   Smoking Status     Former Smoker     Packs/day: 0.10     Years: 30.00     Quit date: 10/30/1980   Smokeless Tobacco     Never Used     Comment: Not a steady smoker       VITALS:  Vitals:    03/26/18 0932   BP: 138/62   Patient Site: Left Arm   Patient Position: Sitting   Cuff Size: Adult Regular   Pulse: (!) 58   SpO2: 98%   Weight: 111 lb (50.3 kg)   Height: 5' 1\" (1.549 m)     Wt Readings from Last 3 Encounters:   03/26/18 111 lb (50.3 kg)   02/09/18 109 lb (49.4 kg)   02/05/18 109 lb (49.4 kg)     Body mass index is 20.97 kg/(m^2).    PHYSICAL EXAM:  Constitutional:   Reveals an alert, talkative, slender woman.  Vitals: per nursing notes.  Second BP recorded by Physician: 158/64  HEENT:  Ears:  External canals, TMs clear.    Eyes:  EOMs full, PERRL.  Oropharynx:   Mouth and throat clear, no thrush or exudate.  Neck:  Supple, no carotid bruits or adenopathy.  Back:  " "No spine or CVA pain.  Thorax:  No bony deformities.  Lungs: Clear to A&P without rales or wheezes.  Respiratory effort normal.  Cardiac:   Regular rate and rhythm, normal S1, S2, no murmur or gallop.  Abdomen:  Soft, active bowel sounds without bruits, mass, or tenderness.  Extremities: Lightly tanned. No peripheral edema, pulses in the feet intact.    Skin:  No jaundice, peripheral cyanosis or lesions to suggest malignancy.  Neuro:  Alert and oriented. Cranial nerves, motor, sensory exams are intact.  No gross focal deficits.  Psychiatric:  Memory intact, mood appropriate.    QUALITY MEASURES:    ADDITIONAL HISTORY SUMMARIZED (2): Reviewed notes from [facility] 02/26/2018, hypertensive. Reviewed notes from Dr. Perez, results of hypercalcemia restated from lab results in August, patient feeling well.  DECISION TO OBTAIN EXTRA INFORMATION (1): None   RADIOLOGY TESTS (1): None  LABS (1): Reviewed, INR, lipids.   MEDICINE TESTS (1): None  INDEPENDENT REVIEW (2 each): None     Total data points: 3    The visit lasted a total of 22 minutes face to face with the patient. Over 50% of the time was spent counseling and educating the patient about medication management.    I, Silva Paulino, am scribing for and in the presence of, Dr. Garcia.    I, Dr. Garcia, personally performed the services described in this documentation, as scribed by Silva Paulino in my presence, and it is both accurate and complete.    Dragon dictation was used for this note.  Speech recognition errors are a possibility.    MEDICATIONS:  Current Outpatient Prescriptions   Medication Sig Dispense Refill     amLODIPine (NORVASC) 5 MG tablet On hold 180 tablet 3     atorvastatin (LIPITOR) 40 MG tablet Take 1 tablet (40 mg total) by mouth daily. 90 tablet 3     BD ULTRA-FINE JOHN PEN NEEDLES 32 gauge x 5/32\" Ndle USE 4 TIMES DAILY AS DIRECTED WITH INSULIN 100 each 5     blood glucose test (CONTOUR NEXT STRIPS) strips Use 1 each As Directed 5 (five) times a " day. Dispense brand per patient's insurance at pharmacy discretion. 450 strip 7     coenzyme Q10 (CO Q-10) 10 mg capsule Take 1 capsule by mouth daily.        GLUC LAMB/CHONDRO LAMB A/VIT C/MN (GLUCOSAMINE CHONDROITIN MAXSTR ORAL) Take by mouth.       HYDROcodone-acetaminophen 5-325 mg per tablet Take 1-2 tablets by mouth every 6 (six) hours as needed for pain. 30 tablet 0     INSULIN ASPART (NOVOLOG FLEXPEN SUBQ) Inject under the skin as needed. Dose per sliding scale       insulin glargine (LANTUS) 100 unit/mL injection Inject 6 Units under the skin at bedtime.       isosorbide mononitrate (IMDUR) 30 MG 24 hr tablet Take 1 tablet (30 mg total) by mouth daily. 90 tablet 3     loperamide (IMODIUM) 2 mg capsule Take 2 mg by mouth 4 (four) times a day as needed for diarrhea.       magnesium 250 mg Tab Take 500 mg by mouth daily.        metoprolol succinate (TOPROL-XL) 50 MG 24 hr tablet Take 1.5 tablets (75 mg total) by mouth at bedtime. 135 tablet 3     multivitamin (ONE A DAY) per tablet Take 1 tablet by mouth daily.       nitroglycerin (NITROSTAT) 0.4 MG SL tablet Place 1 tablet (0.4 mg total) under the tongue every 5 (five) minutes as needed for chest pain. 10 tablet 1     ranitidine (ZANTAC) 150 MG tablet Take 150 mg by mouth 2 (two) times a day.       warfarin (COUMADIN) 1 MG tablet Take 1 mg by mouth once daily in addition to 4 mg tablet (total 5 mg daily). Adjust dose based on INR results as directed. 60 tablet 11     warfarin (COUMADIN) 4 MG tablet Take 1 tablet (4 mg total) by mouth See Admin Instructions. Take one tablet by mouth daily. Adjust dose based on INR results as directed. 90 tablet 3     dulaglutide 0.75 mg/0.5 mL PnIj Inject 0.75 mg under the skin once a week. 4 Syringe 11     No current facility-administered medications for this visit.

## 2021-06-16 NOTE — PROGRESS NOTES
Pt states that she has been taking 4.5 mg daily, not what was listed in anticoagulation.  Per Kay, pt can stay on current dose and recheck in 1 month.  Pt wants to get INRs checked at [facility] since they will poke her finger, not draw blood.  Instructed pt to call clinic to make appt.

## 2021-06-16 NOTE — PROGRESS NOTES
Pt has a primary MD appointment next Monday 2/26 and will have INR checked then.  Instead of 2 weeks as per Kay Kim direction.  Pt will be in [place] in march

## 2021-06-16 NOTE — PROGRESS NOTES
ASSESSMENT: Onychauxis, diabetes with neurologic manifestations, foot pain.    PLAN: Toenails were debrided manually and mechanically x10. Return to clinic in nine weeks.         SUBJECTIVE: The patient returns to the [facility] for help with her toenails which are long, thick and painful. She has altered sensation in fingers and toes following chemotherapy treatments for cancer.  She is also diabetic. She denies any foot trauma.  Last clinic visit was then January 8, 2018.    OBJECTIVE:  General: Pleasant 84 y.o. female in no acute distress.  Vascular: DP pulses are palpable. PT pulses are palpable. Pedal hair is diminished. Feet are cool to the touch.  Neuro: Sensation in the feet is altered. Patient describes paresthesias.  Derm: Toenails are elongated, thickened and dystrophic with discoloration and subungual debris. Skin is thin and shiny but intact.   Musculoskeletal: Hammertoes.

## 2021-06-17 NOTE — PATIENT INSTRUCTIONS - HE
Patient Instructions by Jemal Evans MD at 7/18/2019 10:00 AM     Author: Jemal Evans MD Service: -- Author Type: Physician    Filed: 7/18/2019 11:09 AM Encounter Date: 7/18/2019 Status: Signed    : Jemal Evans MD (Physician)       Patient Education     Topotecan capsules  Brand Name: Hycamtin  What is this medicine?  TOPOTECAN (TOE gal PENG maritza) is a chemotherapy drug. It is used to treat small cell lung cancer.  How should I use this medicine?  Take this medicine by mouth with a glass of water. You can take it with or without food. Do not cut, crush, or chew this medicine. Follow the directions on the prescription label. Take your medicine at regular intervals. Do not take it more often than directed. Do not stop taking except on your doctor's advice.  Talk to your pediatrician regarding the use of this medicine in children. Special care may be needed.  What side effects may I notice from receiving this medicine?  Side effects that you should report to your doctor or health care professional as soon as possible:    allergic reactions like skin rash, itching or hives, swelling of the face, lips, or tongue    breathing difficulties    diarrhea    dizziness    fever or chills, sore throat    mouth sores or pain    pain, tingling, numbness in the hands or feet    unusual bleeding or bruising    unusually weak or tired    yellowing of the eyes or skin  Side effects that usually do not require medical attention (report to your doctor or health care professional if they continue or are bothersome):    hair loss    headache    loss of appetite    nausea, vomiting    stomach pain  What may interact with this medicine?      amiodarone    azithromycin    captopril    carvedilol    certain medications for fungal infections like ketoconazole and itraconazole    clarithromycin    conivaptan    cyclosporine    diltiazem    dronedarone    eltrombopag    erythromycin    felodipine    grapefruit  juice    lopinavir    quercetin    quinidine    ranolazine    ritonavir    ticagrelor    verapamil  What if I miss a dose?  If you miss a dose, take it as soon as you can. If it is almost time for your next dose, take only that dose. Do not take double or extra doses.  Where should I keep my medicine?  Keep out of the reach of children.  Store in the refrigerator between 2 and 8 degrees C (36 and 46 degrees F). Keep this medicine in the original container. Protect from light. Throw away any unused medicine after the expiration date.  What should I tell my health care provider before I take this medicine?  They need to know if you have any of these conditions:    immune system problems    infection (especially a virus infection such as chickenpox, cold sores, or herpes)    kidney disease    low blood counts, like low white cell, platelet, or red cell counts    an unusual or allergic reaction to topotecan, gelatin, other medicines, foods, dyes, or preservatives    pregnant or trying to get pregnant    breast-feeding  What should I watch for while using this medicine?  This drug may make you feel generally unwell. This is not uncommon, as chemotherapy can affect healthy cells as well as cancer cells. Report any side effects. Continue your course of treatment even though you feel ill unless your doctor tells you to stop.  Call your doctor or health care professional for advice if you get a fever, chills or sore throat, or other symptoms of a cold or flu. Do not treat yourself. This drug decreases your body's ability to fight infections. Try to avoid being around people who are sick.  This medicine may increase your risk to bruise or bleed. Call your doctor or health care professional if you notice any unusual bleeding.  Be careful brushing and flossing your teeth or using a toothpick because you may get an infection or bleed more easily. If you have any dental work done, tell your dentist you are receiving this  medicine.  Avoid taking products that contain aspirin, acetaminophen, ibuprofen, naproxen, or ketoprofen unless instructed by your doctor. These medicines may hide a fever.  Do not become pregnant while taking this medicine or for 1 month after stopping it. Women should inform their doctor if they wish to become pregnant or think they might be pregnant. There is a potential for serious side effects to an unborn child. Talk to your health care professional or pharmacist for more information. Do not breast-feed an infant while taking this medicine.  Men must use a latex condom during sexual contact with a woman while taking this medicine and for 3 months after you stop taking this medicine. A latex condom is needed even if you have had a vasectomy. Contact your doctor right away if your partner becomes pregnant. Do not donate sperm while taking this medicine and for 3 months after you stop taking this medicine.  NOTE:This sheet is a summary. It may not cover all possible information. If you have questions about this medicine, talk to your doctor, pharmacist, or health care provider. Copyright  2018 [employer]

## 2021-06-17 NOTE — PROGRESS NOTES
ASSESSMENT:  1.  Hospital follow-up: Blanca was hospitalized at [facility] for febrile illness from 4/17-4/20.  Cultures were negative at that time.  Symptoms were felt to most likely represent an adverse medication reaction by Dr. Abarca.  He received antibiotics including vancomycin initially.  She was not discharged on antibiotics.    2.  Type 2 diabetes: Hemoglobin A1c was good at 7.0 during her hospital stay.    3.  Labile hypertension: Blood pressure currently is acceptable.    4.  Dermatitis: She is noted to have an erythematous maculopapular eruption on the back.  This has not troubled her greatly.  I suspect it is a drug reaction related to the medication she received while hospitalized    5.  Metastatic lung cancer: She will follow-up with Dr. Evans  PLAN:  1.  Zyrtec 10 mg twice daily as needed for itching  2.  Topical Sarna lotion as needed  3.  Shingrix vaccine would be advised if okay with Dr. Evans.  4   follow-up with Dr. Evans for metastatic lung cancer  5.  Call if dermatitis does not improve in 1 week    There are no discontinued medications.        ASSESSED PROBLEMS:  1. 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  INR   2. Paroxysmal atrial fibrillation with rapid ventricular response  INR       CHIEF COMPLAINT:  Chief Complaint   Patient presents with     Follow-up       HISTORY OF PRESENT ILLNESS:  Blanca is a 84 y.o. female presenting to the clinic today for follow up after inpatient care at [facility] from 4/17/18 - 4/20/18 for fever. She is accompanied by her daughter, who provides history. She has stage IV lung cancer followed by Dr. Mario and is currently taking dabrafenib mesylate and trametinib dimethyl sulfoxide.  Her daughter says that her mother called her confused on the morning of the 17th. Her daughter went over to her house and notes that she ate and appeared well during the day but by dinnertime had a fever of 101. She says that  "she was very chilled and soaked through with sweat. On the advice of Dr. Evans, she presented to the ER at [facility]. In the hospital, she was started on IV Vanco. Dr. Evans and ID were consulted and her symptoms were felt to be due to drug reaction versus infection. The antibiotic was discontinued and she was discharged without medication.     She reports that she has felt well since returning from the hospital. She denies fever, dysphagia, or chest pains. She says she is managing well at home with her strength and her daughter agrees. Her daughter notes that she has taken these medications in the past and had a response of weakness. She is not taking them currently. She will follow up with Dr. Evans next week on Thursday.     Type 2 Diabetes: Her most recent A1c performed during her hospital encounter was 7.     Health Maintenance: She was advised on Shingrix.     REVIEW OF SYSTEMS:   She has an itchy rash on her back which has been present since just after leaving the hospital.    All other systems are negative.    PFSH:      TOBACCO USE:  History   Smoking Status     Former Smoker     Packs/day: 0.10     Years: 30.00     Quit date: 10/30/1980   Smokeless Tobacco     Never Used     Comment: Not a steady smoker       VITALS:  Vitals:    04/24/18 0950   BP: 124/68   Patient Site: Left Arm   Patient Position: Sitting   Cuff Size: Adult Regular   Pulse: 78   Weight: 112 lb 9.6 oz (51.1 kg)     Wt Readings from Last 3 Encounters:   04/24/18 112 lb 9.6 oz (51.1 kg)   04/18/18 111 lb 12.8 oz (50.7 kg)   04/04/18 113 lb 3.2 oz (51.3 kg)     Body mass index is 22.74 kg/(m^2).      MEDICATIONS:  Current Outpatient Prescriptions   Medication Sig Dispense Refill     atorvastatin (LIPITOR) 40 MG tablet Take 1 tablet (40 mg total) by mouth daily. 90 tablet 3     BD ULTRA-FINE JOHN PEN NEEDLES 32 gauge x 5/32\" Ndle USE 4 TIMES DAILY AS DIRECTED WITH INSULIN 100 each 5     blood glucose test (CONTOUR NEXT STRIPS) strips Use " 1 each As Directed 5 (five) times a day. Dispense brand per patient's insurance at pharmacy discretion. 450 strip 7     INSULIN ASPART (NOVOLOG FLEXPEN SUBQ) Inject 0-8 Units under the skin 3 (three) times a day before meals. Dose per sliding scale  BG less than 150 = 0 units  151-200 = 4 units  201- 250 = 6 units  > 251 = 8 units       insulin glargine (LANTUS) 100 unit/mL injection Inject 6 Units under the skin at bedtime.       isosorbide mononitrate (IMDUR) 30 MG 24 hr tablet Take 1 tablet (30 mg total) by mouth daily. 90 tablet 3     loperamide (IMODIUM) 2 mg capsule Take 2 mg by mouth 4 (four) times a day as needed for diarrhea.       magnesium 250 mg Tab Take 500 mg by mouth daily.        metoprolol succinate (TOPROL-XL) 50 MG 24 hr tablet Take 75 mg by mouth daily. 1 and 1/2 tablets       multivitamin (ONE A DAY) per tablet Take 1 tablet by mouth daily.       nitroglycerin (NITROSTAT) 0.4 MG SL tablet Place 1 tablet (0.4 mg total) under the tongue every 5 (five) minutes as needed for chest pain. 10 tablet 1     ranitidine (ZANTAC) 150 MG tablet Take 150 mg by mouth 2 (two) times a day.       warfarin (COUMADIN) 4 MG tablet Take 4-4.5 mg by mouth See Admin Instructions. 4.5 mg Mon, Wed, and Fri.  4 mg Tues, Thurs, Sat, and Sun.  Adjust dose based on INR results as directed.       DABRAFENIB MESYLATE (DABRAFENIB ORAL) Take 75 mg by mouth 2 (two) times a day. 4/17/18 supposed to resume at 1/2 the previous dose       TRAMETINIB DIMETHYL SULFOXIDE (MEKINIST ORAL) Take 1 mg by mouth daily. 4/17/18 supposed to resume at 1/2 the previous dose       No current facility-administered medications for this visit.        PHYSICAL EXAM:  Constitutional:   Reveals an alert, talkative woman who seems somewhat less animated than usual.  Vitals: per nursing notes.  HEENT:  Thinning hair.     Eyes:  No scleral icterus.   Oropharynx:   Mouth and throat clear, no thrush or exudate.  Neck:  Supple, no carotid bruits or  adenopathy.  Back:  No spine or CVA pain.  Thorax:  No bony deformities.  Lungs: Clear to A&P without rales or wheezes.  Respiratory effort normal.  Cardiac:   Regular rate and rhythm, normal S1, S2, no murmur.  Abdomen:  Soft, active bowel sounds without bruits, mass, or tenderness.  Extremities:   No edema.    Skin:  Somewhat pale but clear. Slightly raised macular papular eruption on the back. No vesicles.       ADDITIONAL HISTORY SUMMARIZED (2): Reviewed hospital course of 4/17-4/20/18.   DECISION TO OBTAIN EXTRA INFORMATION (1): None.   RADIOLOGY TESTS (1): None.  LABS (1): Reviewed labs performed during hospital encounter regarding A1c.  MEDICINE TESTS (1): None.  INDEPENDENT REVIEW (2 each): None.     The visit lasted a total of 22 minutes face to face with the patient. Over 50% of the time was spent counseling and educating the patient about her recent hospitalization.    IChauncey, am scribing for and in the presence of, Dr. Garcia.    IMarco, personally performed the services described in this documentation, as scribed by Chauncey Yip in my presence, and it is both accurate and complete.    Dragon dictation was used for this note.  Speech recognition errors are a possibility.      Total data points: 3

## 2021-06-17 NOTE — PROGRESS NOTES
Left detailed message on daughter, Krystal's cell phone to stay on the same dose and recheck on 5/3/18, at her already scheduled appt.    Francisca Ortiz RN 4/24/18 [id number]

## 2021-06-17 NOTE — PROGRESS NOTES
[facility] Hematology and Oncology Progress Note    Patient: Blanca Oreilly  MRN: [id number]  Date of Service: 4/4/2018           Reason for visit      1. Malignant neoplasm of upper lobe bronchus, right, PD-L1 and [id number] mutation positive.    NSCLC; EGFR and ALK-1 negative. PD-L1 strongly positive for both 22C3, 28-8    Assessment     1. Very pleasant 84 y.o. woman with stage IV NSCLC, adenocarcinoma. Negative for EGFR  And ALK-1. We also checked PD-L1 and she is strongly positive.  On strata testing she has BRA [id number] mutation.  November 2017 PET scan suggestive of liver metastases as well as mediastinal lymph node metastases.  She was prescribed and took few days of dabrafenib and Trimetinib in the second week of December 2017.  Stopped after few days.  Surprisingly her PET scan in January 2018 showed very good response.  Current CT scan is now beginning to demonstrate progression.  2.  History of prior treatment include 4 cycles of carboplatin, taxol and avastin. Then was on Alimta and Avastin.  Later on she has had repeat treatment with 4 cycles Carboplatin and Taxol. PET scan showed good response.  Started on Pembrolizumab but after 4 cycles there was some progression.  She then tried and progressed on Opdivo.  Most Recently she was on Abraxane from November 2016 until April 2017. PET scan showed complete response. Stopped due to side effects. Her May 2017 PET scan showed progression.  She was on Atezulizumab from end of May beginning of October 2017. PET scan in November 2017 showed worsening of the liver metastases.   3. Gastric ulcer pathology is consistent with non-small cell lung cancer.  Clinically she seems to be doing well on that her hemoglobin is stable and she has not had any more blood in her stools.  4. Elevated LFT, mostly Alk Phos which now appears may be due to progression of her disease.  5. New diagnosis of DVT for which she is on anticoagulation.  6. Anxiety and  depression.  7. Recent bleeding from submucosal ulcer in her mouth.  8. 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    Plan     1. At this time I would recommend that she needs to start back on some treatment since the CT scan is clearly showing progression.  I would recommend going back on TECCENTRIQ.  In my opinion she just had delayed response to TECCENTRIQ previously.  So it makes sense to go back on it.  If there is no response then I did talk to her to see if she would be willing to try going back on treatment with dabrafenib and trimatinib at lower doses.  The other option is trying dabrafenib and Trimetinib at lower doses alone.  Not sure how much she is going to respond and how well she is going to tolerate.  2. RTC in few weeks when she is ready to start treatment with TECCENTRIQ.  3. Continue anticoagulation.  4. Call if she has any other new symptoms.    Clinical stage      Lung cancer, Right side    Primary site: Lung (Right)    Staging method: AJCC 7th Edition    Clinical: Stage IV (T4, N0, M1a) signed by Jemal Evans MD on 10/20/2014  2:19 PM    Summary: Stage IV (T4, N0, M1a)      History     Blanca Oreilly is a very pleasant 84 y.o. old female with a history of  non-small cell lung cancer located in the right lung measuring 5.7 cm in size presenting with some shortness of breath associated with malignant pleural effusion in the month of October 2014.  Pleural tap was positive for malignant cells adenocarcinoma in nature.  CK 7 positive CK 20 negative TTF-1 positive.  Subsequently that she was admitted due to worsening shortness of breath and her pleural effusion was tapped . She had Pleurx catheter placed but then removed as pleural fluid has reduced to few cc per week.   Her PET scan done at [facility] showed disease limited to thorax only. She has started on chemotherapy with carboplatin and Taxol with avastin. Has had 4 doses. Her PET scan showed nearly  complete response. So we started her on Alimta and Avastin. She started that in February 2015.    She had CT in September 2015 which showed some worsening of subcrinal LN. Then had pet scan. That shows progression. Started back on Carboplatin and Taxol. We did give her Avastin but then we had to hold it due to protienuria. CT scan after 2 cycles showed improvement. PET scan after 4 showed partial response. She was tested for PD-L1 and was positive for both opdivo and Pembrolizumab. So we started on Pembrolizumab in late January 2016.  However PET scan after 3 cycles showed progression. So in April we started on Opdivo.    Was in hospital in the beginning of September 2016 with GI bleed and was found to have gastric ulcer.  She was started on PPI. Coumadin stopped.  In October 2016 her PET scan  showed progression.  Opdivo has been stopped.  Her PET scan also showed some fullness on her kidney.  Her creatinine had gone up.  She was then admitted and started on some IV fluids and seen by nephrology.  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  High doses of steroids actually give her diabetes.  In the meantime she also lost her  was battling myelodysplastic syndrome.  She had a follow-up endoscopy for the gastric ulcer and that came back positive for adenocarcinoma consistent with lung primary.  She has been started on salvage chemotherapy with single agent Abraxane.  She had 1 cycle in November.  She is here for her next cycle.    She is managing her diabetes well.  Dr. Mayo started her on insulin.  Her blood sugars are getting better.    Tolerating abraxane well. Blood sugars ok.  The scan after 3 cycles showed significant improvement in the mediastinal as well as abdominal lymphadenopathy.  She is not having any more GI bleeding.  Her hemoglobin is going up.  She did have an abnormal stress test for which  she ended up being in the hospital.  A reversible ischemic defect was noted.  She has been put on nitrates.  She is otherwise doing well.  She has not had any EGD since starting her chemotherapy.  She had PET scan in March which actually showed complete response.    She continued Abraxane however her neuropathy seems to have gotten worse.  We stopped treatment in April 2017.     PET scan in May 2017 showed progression. I recommended trying Tecentriq which she started end of May 2017.   PET scan in August 2017 showed partial response.  However in late September 2017 she started to have elevation of her liver function test which required me to stop her treatment.  In spite of high dose of prednisone her liver function test continue to get worse.  She had PET scan done November 2017 which showed that she had developed new liver metastases.      Due to the fact that her tumor molecular profiling on strata testing showed that she had BRAF   mutation I prescribed dabrafenib and Trimetinib.  She started those medications sometime in the first of the second week of December 2017. She was diagnosed to have a DVT in her left calf on December 7, 2017.  She has been started on anticoagulation.  She eventually was unable to handle those medications.  She has not taken anything since I believe 12 December 2017.  Her PET scan in January 2018 actually showed that she had significant improvement in her disease which was surprising.  In my opinion it most likely represented delayed response to TECCENTRIQ or some spontaneous regression.  It is unlikely that she responded to few pills of Trimetinib and dabrafenib.    She has been feeling quite well.  Comes in today after a CT scan.    Past Medical History     Past Medical History:   Diagnosis Date     A-fib      Anemia      Cancer of lung      Carotid stenosis      Chronic kidney disease      CKD (chronic kidney disease)      Coronary artery disease      Diabetes mellitus  11/1/2016     Diabetes mellitus, type II      Exacerbation of asthma 4/4/2007     High cholesterol      HTN (hypertension)      Hyperlipidemia      Hypertension      Peripheral neuropathy      Pleural effusion      Primary lung adenocarcinoma     Stage IV NSCLC     Upper GI bleed      Review of Systems   Constitutional  Constitutional (WDL): All constitutional elements are within defined limitsDenies any complaints.  Neurosensory  Neurosensory (WDL): All neurosensory elements are within defined limits  Cardiovascular  Cardiovascular (WDL): All cardiovascular elements are within defined limits  Pulmonary     Gastrointestinal  Gastrointestinal (WDL): Exceptions to WDL  Diarrhea: Increase of <4 stools per day over baseline, mild increase in ostomy output compared to baseline (takes imodium)  Genitourinary  Genitourinary (WDL): All genitourinary elements are within defined limits  Integumentary  Integumentary (WDL): All integumentary elements are within defined limits  Patient Coping  Patient Coping: Accepting  ECOG Performance   1  Pain Status  Currently in Pain: No/denies  Accompanied by  Accompanied by: Family Member (daughter)      Vital Signs     Vitals:    04/04/18 1526   BP: (!) 197/81   Pulse: 63   Temp: 98.3  F (36.8  C)   SpO2: 96%       Physical Exam     GENERAL: no acute distress. Cooperative in conversation.   HEENT: Starting to have alopecia. pupils are equal, round and reactive. Oral mucosa is clean and intact. No ulcerations or mucositis noted. No bleeding noted.  RESP:Chest symmetric lungs are clear bilaterally per auscultation. Regular respiratory rate. No wheezes or rhonchi.    CV: Normal S1 S2 Regular, rate and rhythm.   ABD: Nondistended, soft, slight epigastric tenderness. Positive bowel sounds. No organomegaly.   EXTREMITIES: No lower extremity edema.  Noticing slight increased edema around the lower part of her ankles.  NEURO: non focal. Alert and oriented x3.  No obvious neuro deficit.  PSYCH:  within normal limits. No depression or anxiety.  SKIN: warm dry intact .      Lab Results     Results for orders placed or performed in visit on 04/02/18   Comprehensive Metabolic Panel   Result Value Ref Range    Sodium 142 136 - 145 mmol/L    Potassium 4.2 3.5 - 5.0 mmol/L    Chloride 105 98 - 107 mmol/L    CO2 28 22 - 31 mmol/L    Anion Gap, Calculation 9 5 - 18 mmol/L    Glucose 153 (H) 70 - 125 mg/dL    BUN 23 8 - 28 mg/dL    Creatinine 1.25 (H) 0.60 - 1.10 mg/dL    GFR MDRD Af Amer 49 (L) >60 mL/min/1.73m2    GFR MDRD Non Af Amer 41 (L) >60 mL/min/1.73m2    Bilirubin, Total 0.6 0.0 - 1.0 mg/dL    Calcium 10.0 8.5 - 10.5 mg/dL    Protein, Total 6.4 6.0 - 8.0 g/dL    Albumin 3.2 (L) 3.5 - 5.0 g/dL    Alkaline Phosphatase 218 (H) 45 - 120 U/L    AST 61 (H) 0 - 40 U/L    ALT 68 (H) 0 - 45 U/L   HM1 (CBC with Diff)   Result Value Ref Range    WBC 10.7 4.0 - 11.0 thou/uL    RBC 4.18 3.80 - 5.40 mill/uL    Hemoglobin 11.8 (L) 12.0 - 16.0 g/dL    Hematocrit 37.5 35.0 - 47.0 %    MCV 90 80 - 100 fL    MCH 28.2 27.0 - 34.0 pg    MCHC 31.5 (L) 32.0 - 36.0 g/dL    RDW 15.6 (H) 11.0 - 14.5 %    Platelets 247 140 - 440 thou/uL    MPV 10.4 8.5 - 12.5 fL    Neutrophils % 72 (H) 50 - 70 %    Lymphocytes % 11 (L) 20 - 40 %    Monocytes % 9 2 - 10 %    Eosinophils % 7 (H) 0 - 6 %    Basophils % 1 0 - 2 %    Neutrophils Absolute 7.7 2.0 - 7.7 thou/uL    Lymphocytes Absolute 1.2 0.8 - 4.4 thou/uL    Monocytes Absolute 0.9 0.0 - 0.9 thou/uL    Eosinophils Absolute 0.7 (H) 0.0 - 0.4 thou/uL    Basophils Absolute 0.1 0.0 - 0.2 thou/uL   INR   Result Value Ref Range    INR 1.75 (H) 0.90 - 1.10         Distress assessment and ECOG status      ECOG Performance:    ECOG Performance Status: 0    Distress Assessment  Distress Assessment Score: Extreme distress       Imaging Results       Ct Chest Without Contrast    Result Date: 4/2/2018  CT CHEST WO CONTRAST 4/2/2018 11:42 AM INDICATION: Neoplasm: chest, metastatic, rx monitor or f/u  TECHNIQUE: Routine chest. Dose reduction techniques were used. IV CONTRAST: None COMPARISON: PET/CT dated 1/29/2018. Chest CT dated 12/8/2015. FINDINGS: LUNGS AND PLEURA: No bronchial wall thickening or bronchiectasis. Minimal biapical pleural-parenchymal scarring is present. No change in a right paramediastinal opacity. Scattered nodules throughout the lungs have not changed. For example, a 4 mm superior segment left lower lobe nodule has not changed (series 3 image 45). A 3 mm right lower lobe nodule has not changed (image 67). A 3 Limited left lower lobe nodule has not changed (image 74). A 5 mm x 3 monitor right lower lobe nodule has not changed (image 83). A minimal opacity at the peripheral left lung base may represent a tiny infectious or inflammatory focus or minimal atelectasis. Otherwise no new pulmonary nodules identified. MEDIASTINUM: Hypoattenuating foci in the thyroid gland have not changed. There is advanced atherosclerotic calcification involving the thoracic aorta. There is scattered coronary artery calcification. The pulmonary arteries are enlarged. A right lower paratracheal lymph node is 14 mm in short axis (previously 13 mm). A 14 mm prevascular lymph node was previously 12 mm. Additional mediastinal lymph nodes have not significantly changed in size. The previously described 11 R lymph node is not clearly identified. LIMITED UPPER ABDOMEN: The right kidney is atrophic. There is advanced atherosclerotic disease. Small lymph nodes are seen in the upper abdominal distributions. 8 13 mm portacaval lymph node previously measured 8 mm. An 18 mm lymph node posterior to the left renal vein was previously 8 mm. MUSCULOSKELETAL: Negative.     CONCLUSION: 1.  A few enlarged mediastinal and upper abdominal lymph nodes have slightly increased in size. 2.  No significant change in pulmonary nodules and a right paramediastinal opacity. 3.  Advanced atherosclerotic calcification. 4.  Enlarged pulmonary  arteries, suggesting pulmonary hypertension.          Signed by: Jemal Evans MD

## 2021-06-17 NOTE — PROGRESS NOTES
[facility] Hematology and Oncology Progress Note    Patient: Blanca Oreilly  MRN: [id number]  Date of Service: 5/3/2018           Reason for visit      1. Essential hypertension    2. Malignant neoplasm of upper lobe bronchus, right, PD-L1 and [id number] mutation positive.    NSCLC; EGFR and ALK-1 negative. PD-L1 strongly positive for both 22C3, 28-8    Assessment     1. Very pleasant 84 y.o. woman with stage IV NSCLC, adenocarcinoma. Negative for EGFR  And ALK-1. We also checked PD-L1 and she is strongly positive.  On strata testing she has BRA [id number] mutation.  November 2017 PET scan suggestive of liver metastases as well as mediastinal lymph node metastases.  She was prescribed and took few days of dabrafenib and Trimetinib in the second week of December 2017.  Stopped after few days.  Surprisingly her PET scan in January 2018 showed very good response.  Current CT scan in the beginning of April showed progression.  She resumed dabrafenib and Trimetinib.  Took them for a few days.  Then stopped.  Current CT scan again shows that she has responded to the.  2.  History of prior treatment include 4 cycles of carboplatin, taxol and avastin. Then was on Alimta and Avastin.  Later on she has had repeat treatment with 4 cycles Carboplatin and Taxol. PET scan showed good response.  Started on Pembrolizumab but after 4 cycles there was some progression.  She then tried and progressed on Opdivo.  Most Recently she was on Abraxane from November 2016 until April 2017. PET scan showed complete response. Stopped due to side effects. Her May 2017 PET scan showed progression.  She was on Atezulizumab from end of May beginning of October 2017. PET scan in November 2017 showed worsening of the liver metastases.   3. Gastric ulcer pathology is consistent with non-small cell lung cancer.  Clinically she seems to be doing well on that her hemoglobin is stable and she has not had any more blood in her stools.  4. Elevated LFT,  mostly Alk Phos which now appears may be due to progression of her disease.  5. New diagnosis of DVT for which she is on anticoagulation.  6. Anxiety and depression.  7. Recent bleeding from submucosal ulcer in her mouth.  8. 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    Plan     1. At this time I would recommend going back on treatment with dabrafenib and trimatinib but only one dose of each once a week. We will see how well she is going to tolerate.  It is quite clear that she is very sensitive to those medication but she also responds very well as far as a tumor regression is concerned.  My goal is to start her on a lower dose and then work their way up to the maximum tolerated dose.  2. RTC in few weeks after she has started the treatment.  3. Continue anticoagulation per plan.  4. Call if she has any other new symptoms.    Clinical stage      Lung cancer, Right side    Primary site: Lung (Right)    Staging method: AJCC 7th Edition    Clinical: Stage IV (T4, N0, M1a) signed by Jemal Evans MD on 10/20/2014  2:19 PM    Summary: Stage IV (T4, N0, M1a)      History     Blanca Oreilly is a very pleasant 84 y.o. old female with a history of  non-small cell lung cancer located in the right lung measuring 5.7 cm in size presenting with some shortness of breath associated with malignant pleural effusion in the month of October 2014.  Pleural tap was positive for malignant cells adenocarcinoma in nature.  CK 7 positive CK 20 negative TTF-1 positive.  Subsequently that she was admitted due to worsening shortness of breath and her pleural effusion was tapped . She had Pleurx catheter placed but then removed as pleural fluid has reduced to few cc per week.   Her PET scan done at [facility] showed disease limited to thorax only. She has started on chemotherapy with carboplatin and Taxol with avastin. Has had 4 doses. Her PET scan showed nearly complete response. So we started her on  Alimta and Avastin. She started that in February 2015.    She had CT in September 2015 which showed some worsening of subcrinal LN. Then had pet scan. That shows progression. Started back on Carboplatin and Taxol. We did give her Avastin but then we had to hold it due to protienuria. CT scan after 2 cycles showed improvement. PET scan after 4 showed partial response. She was tested for PD-L1 and was positive for both opdivo and Pembrolizumab. So we started on Pembrolizumab in late January 2016.  However PET scan after 3 cycles showed progression. So in April we started on Opdivo.    Was in hospital in the beginning of September 2016 with GI bleed and was found to have gastric ulcer.  She was started on PPI. Coumadin stopped.  In October 2016 her PET scan  showed progression.  Opdivo has been stopped.  Her PET scan also showed some fullness on her kidney.  Her creatinine had gone up.  She was then admitted and started on some IV fluids and seen by nephrology.  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  High doses of steroids actually give her diabetes.  In the meantime she also lost her  was battling myelodysplastic syndrome.  She had a follow-up endoscopy for the gastric ulcer and that came back positive for adenocarcinoma consistent with lung primary.  She has been started on salvage chemotherapy with single agent Abraxane.  She had 1 cycle in November.  She is here for her next cycle.    She is managing her diabetes well.  Dr. Mayo started her on insulin.  Her blood sugars are getting better.    Tolerated Abraxane well. The scan after 3 cycles showed significant improvement in the mediastinal as well as abdominal lymphadenopathy.   She did have an abnormal stress test for which she ended up being in the hospital.  A reversible ischemic defect was noted.  She has been put on nitrates.  She is otherwise  doing well.  She has not had any EGD since starting her chemotherapy.  She had PET scan in March which actually showed complete response.    She continued Abraxane however her neuropathy seems to have gotten worse.  We stopped treatment in April 2017.     PET scan in May 2017 showed progression. I recommended trying Tecentriq which she started end of May 2017.   PET scan in August 2017 showed partial response.  However in late September 2017 she started to have elevation of her liver function test which required me to stop her treatment.  In spite of high dose of prednisone her liver function test continue to get worse.  She had PET scan done November 2017 which showed that she had developed new liver metastases.      Due to the fact that her tumor molecular profiling on strata testing showed that she had BRAF   mutation I prescribed dabrafenib and Trimetinib.  She started those medications sometime in the first of the second week of December 2017. She was diagnosed to have a DVT in her left calf on December 7, 2017.  She has been started on anticoagulation.  She eventually was unable to handle those medications and stopped them I believe 12 December 2017.  Her PET scan in January 2018 actually showed that she had significant improvement in her disease which was surprising.  In my opinion it most likely represented delayed response to TECCENTRIQ or some spontaneous regression.  My impression at that time was that it is unlikely that she responded to few pills of Trimetinib and dabrafenib.    Then in the beginning of April her CT scan showed progression mostly in the lymph nodes of the mediastinum and upper abdomen.  We discussed treatment options and interventions.  We discussed going to TECCENTRIQ or resuming dabrafenib and Trimetinib.  She eventually started with the oral pills.  She took them for about a week or so the last dose was sometime on 15 April 2018.  She was admitted at [facility] with  fever which were felt to be secondary to these medications.    Comes in today after CT scan..    Past Medical History     Past Medical History:   Diagnosis Date     A-fib      Anemia      Cancer of lung      Carotid stenosis      Chronic kidney disease      CKD (chronic kidney disease)      Coronary artery disease      Diabetes mellitus 11/1/2016     Diabetes mellitus, type II      Exacerbation of asthma 4/4/2007     High cholesterol      HTN (hypertension)      Hyperlipidemia      Hypertension      Peripheral neuropathy      Pleural effusion      Primary lung adenocarcinoma     Stage IV NSCLC     Upper GI bleed      Review of Systems   Constitutional  Constitutional (WDL): All constitutional elements are within defined limitsDenies any complaints.  Neurosensory  Neurosensory (WDL): Exceptions to WDL  Ataxia: Asymptomatic, clinical or diagnostic observations only, intervention not indicated  Peripheral Sensory Neuropathy: Moderate symptoms, limiting instrumental ADL (feet and hands)  Cardiovascular  Cardiovascular (WDL): All cardiovascular elements are within defined limits  Pulmonary     Gastrointestinal  Gastrointestinal (WDL): Exceptions to WDL  Genitourinary  Genitourinary (WDL): All genitourinary elements are within defined limits  Integumentary  Integumentary (WDL): All integumentary elements are within defined limits  Patient Coping  Patient Coping: Accepting  ECOG Performance   1  Pain Status  Currently in Pain: Yes  Accompanied by  Accompanied by: Family Member (daughter)      Vital Signs     Vitals:    05/03/18 1447   BP: (!) 207/88   Pulse:    Temp:    SpO2:        Physical Exam     GENERAL: no acute distress. Cooperative in conversation.   HEENT: Starting to have alopecia. pupils are equal, round and reactive. Oral mucosa is clean and intact. No ulcerations or mucositis noted. No bleeding noted.  RESP:Chest symmetric lungs are clear bilaterally per auscultation. Regular respiratory rate. No wheezes or  rhonchi.    CV: Normal S1 S2 Regular, rate and rhythm.   ABD: Nondistended, soft, slight epigastric tenderness. Positive bowel sounds. No organomegaly.   EXTREMITIES: No lower extremity edema.  Noticing slight increased edema around the lower part of her ankles.  NEURO: non focal. Alert and oriented x3.  No obvious neuro deficit.  PSYCH: within normal limits. No depression or anxiety.  SKIN: warm dry intact .      Lab Results     Results for orders placed or performed in visit on 05/03/18   INR   Result Value Ref Range    INR 3.31 (H) 0.90 - 1.10   HM2 (CBC W/O DIFF)   Result Value Ref Range    WBC 5.9 4.0 - 11.0 thou/uL    RBC 3.81 3.80 - 5.40 mill/uL    Hemoglobin 10.6 (L) 12.0 - 16.0 g/dL    Hematocrit 34.4 (L) 35.0 - 47.0 %    MCV 90 80 - 100 fL    MCH 27.8 27.0 - 34.0 pg    MCHC 30.8 (L) 32.0 - 36.0 g/dL    RDW 18.6 (H) 11.0 - 14.5 %    Platelets 259 140 - 440 thou/uL    MPV 9.6 8.5 - 12.5 fL   Basic Metabolic Panel   Result Value Ref Range    Sodium 143 136 - 145 mmol/L    Potassium 4.3 3.5 - 5.0 mmol/L    Chloride 102 98 - 107 mmol/L    CO2 28 22 - 31 mmol/L    Anion Gap, Calculation 13 5 - 18 mmol/L    Glucose 170 (H) 70 - 125 mg/dL    Calcium 10.2 8.5 - 10.5 mg/dL    BUN 23 8 - 28 mg/dL    Creatinine 1.27 (H) 0.60 - 1.10 mg/dL    GFR MDRD Af Amer 49 (L) >60 mL/min/1.73m2    GFR MDRD Non Af Amer 40 (L) >60 mL/min/1.73m2     *Note: Due to a large number of results and/or encounters for the requested time period, some results have not been displayed. A complete set of results can be found in Results Review.         Distress assessment and ECOG status      ECOG Performance:    ECOG Performance Status: 1    Distress Assessment  Distress Assessment Score: Extreme distress       Imaging Results       Xr Chest 1 View Portable    Result Date: 4/17/2018  XR CHEST 1 VIEW PORTABLE 4/17/2018 10:03 PM INDICATION: Fever COMPARISON: CT 04/02/2018 FINDINGS: Basilar compressive changes with poor ventilatory effort. This  could obscure a focus of pneumonia at the left lower lobe. Remainder stable.    Ct Chest Without Contrast    Result Date: 5/1/2018  CT CHEST WO CONTRAST 5/1/2018 10:51 AM INDICATION: Malignant neoplasm of upper lobe bronchus, right, PD-L1 and [id number] mutation positive. TECHNIQUE: Routine chest. Dose reduction techniques were used. IV CONTRAST: None. COMPARISON: 04/02/2018. FINDINGS: LUNGS AND PLEURA: No significant change of scattered small pulmonary nodules, upper lobe predominant groundglass centrilobular nodules and emphysema. No significant change of medial middle lobe volume loss and opacity. No pleural effusion or pneumothorax. MEDIASTINUM: Some mediastinal nodes show slight improved size with left of midline anterior mediastinal node measuring 14 x 14 mm versus 16 x 16 mm previously and low right paratracheal node measuring 9 x 10 mm versus 13 x 14 mm previously (series 4, images 98 and 89). Severe atherosclerosis. No pericardial effusion. LIMITED UPPER ABDOMEN: Slightly thickened adrenals without significant change. Upper abdominal adenopathy has improved with example left retroperitoneal node measuring 10 x 15 mm versus 18 x 26 mm previously (series 4, image 36). Stable atrophic right kidney. MUSCULOSKELETAL: Negative.     CONCLUSION: 1.  Mildly improved adenopathy in the chest and visualized upper abdomen. 2.  No new or enlarging findings.     Total time spent was 40 minutes, more than half of it was in face-to-face counseling regarding disease state, treatment, side effects and management.        Signed by: Jemal Evans MD

## 2021-06-17 NOTE — PROGRESS NOTES
INR result reviewed with Kay CHANEL. Patient is to increase her coumadin dosage: 4.5 mg on Mon, Weds, Fri and 4 mg all other day. INR is to be rechecked in 2 weeks. I call Blanca to report this. She verbalizes understanding and appreciation of our conversation. She will call her primary care clinic to set up a lab appt.

## 2021-06-18 NOTE — PROGRESS NOTES
[facility] Hematology and Oncology Progress Note    Patient: Blanca Oreilly  MRN: [id number]  Date of Service: 6/14/2018           Reason for visit      1. Malignant neoplasm of upper lobe bronchus, right, PD-L1 and [id number] mutation positive.    NSCLC; EGFR and ALK-1 negative. PD-L1 strongly positive for both 22C3, 28-8    Assessment     1. Very pleasant 84 y.o. woman with stage IV NSCLC, adenocarcinoma. Negative for EGFR  And ALK-1. We also checked PD-L1 and she is strongly positive.  On strata testing she has BRA [id number] mutation.  November 2017 PET scan suggestive of liver metastases as well as mediastinal lymph node metastases.  She was prescribed and took few days of dabrafenib and Trimetinib in the second week of December 2017.  Stopped after few days.  Surprisingly her PET scan in January 2018 showed very good response.  Current CT scan in the beginning of April showed progression.  She resumed dabrafenib and Trimetinib.  Took them for a few days.  Then stopped but in April CT scan again shows that she has responded to the medication. So in May she went  back on treatment with dabrafenib and trimatinib but only one dose of each once a week.  2.  History of prior treatment include 4 cycles of carboplatin, taxol and avastin. Then was on Alimta and Avastin.  Later on she has had repeat treatment with 4 cycles Carboplatin and Taxol. PET scan showed good response.  Started on Pembrolizumab but after 4 cycles there was some progression.  She then tried and progressed on Opdivo.  Most Recently she was on Abraxane from November 2016 until April 2017. PET scan showed complete response. Stopped due to side effects. Her May 2017 PET scan showed progression.  She was on Atezulizumab from end of May beginning of October 2017. PET scan in November 2017 showed worsening of the liver metastases.   3. Gastric ulcer pathology is consistent with non-small cell lung cancer.  Clinically she seems to be doing well on that  her hemoglobin is stable and she has not had any more blood in her stools.  4. Elevated LFT, mostly Alk Phos which now appears may be due to progression of her disease.  5. New diagnosis of DVT for which she is on anticoagulation.  6. Anxiety and depression.  7. Recent bleeding from submucosal ulcer in her mouth.  8. 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    Plan     1. At this time I would recommend continuing treatment with dabrafenib and trimatinib but increase the dose to one dose of each every 6th day. We will see how well she is going to tolerate. My goal is to work their way up to the maximum tolerated dose.  2. RTC in 6-8 weeks with a scan.  Due to her concern about memory and other reasons we are also going to repeat her brain MRI as well.  3. Continue anticoagulation per plan.  4. Call if she has any other new symptoms.    Clinical stage      Lung cancer, Right side    Primary site: Lung (Right)    Staging method: AJCC 7th Edition    Clinical: Stage IV (T4, N0, M1a) signed by Jemal Evans MD on 10/20/2014  2:19 PM    Summary: Stage IV (T4, N0, M1a)      History     Blanca Oreilly is a very pleasant 84 y.o. old female with a history of  non-small cell lung cancer located in the right lung measuring 5.7 cm in size presenting with some shortness of breath associated with malignant pleural effusion in the month of October 2014.  Pleural tap was positive for malignant cells adenocarcinoma in nature.  CK 7 positive CK 20 negative TTF-1 positive.  Subsequently that she was admitted due to worsening shortness of breath and her pleural effusion was tapped . She had Pleurx catheter placed but then removed as pleural fluid has reduced to few cc per week.   Her PET scan done at [facility] showed disease limited to thorax only. She has started on chemotherapy with carboplatin and Taxol with avastin. Has had 4 doses. Her PET scan showed nearly complete response. So we  started her on Alimta and Avastin. She started that in February 2015.    She had CT in September 2015 which showed some worsening of subcrinal LN. Then had pet scan. That shows progression. Started back on Carboplatin and Taxol. We did give her Avastin but then we had to hold it due to protienuria. CT scan after 2 cycles showed improvement. PET scan after 4 showed partial response. She was tested for PD-L1 and was positive for both opdivo and Pembrolizumab. So we started on Pembrolizumab in late January 2016.  However PET scan after 3 cycles showed progression. So in April we started on Opdivo.    Was in hospital in the beginning of September 2016 with GI bleed and was found to have gastric ulcer.  She was started on PPI. Coumadin stopped.  In October 2016 her PET scan  showed progression.  Opdivo has been stopped.  Her PET scan also showed some fullness on her kidney.  Her creatinine had gone up.  She was then admitted and started on some IV fluids and seen by nephrology.  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  High doses of steroids actually give her diabetes.  In the meantime she also lost her  was battling myelodysplastic syndrome.  She had a follow-up endoscopy for the gastric ulcer and that came back positive for adenocarcinoma consistent with lung primary.  She has been started on salvage chemotherapy with single agent Abraxane.  She had 1 cycle in November.  She is here for her next cycle.    She is managing her diabetes well.  Dr. Mayo started her on insulin.  Her blood sugars are getting better.    Tolerated Abraxane well. The scan after 3 cycles showed significant improvement in the mediastinal as well as abdominal lymphadenopathy.   She did have an abnormal stress test for which she ended up being in the hospital.  A reversible ischemic defect was noted.  She has been put on nitrates.  She  is otherwise doing well.  She has not had any EGD since starting her chemotherapy.  She had PET scan in March which actually showed complete response.    She continued Abraxane however her neuropathy seems to have gotten worse.  We stopped treatment in April 2017.     PET scan in May 2017 showed progression. I recommended trying Tecentriq which she started end of May 2017.   PET scan in August 2017 showed partial response.  However in late September 2017 she started to have elevation of her liver function test which required me to stop her treatment.  In spite of high dose of prednisone her liver function test continue to get worse.  She had PET scan done November 2017 which showed that she had developed new liver metastases.      Due to the fact that her tumor molecular profiling on strata testing showed that she had BRAF   mutation I prescribed dabrafenib and Trimetinib.  She started those medications sometime in the first of the second week of December 2017. She was diagnosed to have a DVT in her left calf on December 7, 2017.  She has been started on anticoagulation.  She eventually was unable to handle those medications and stopped them I believe 12 December 2017.  Her PET scan in January 2018 actually showed that she had significant improvement in her disease which was surprising.  In my opinion it most likely represented delayed response to TECCENTRIQ or some spontaneous regression.  My impression at that time was that it is unlikely that she responded to few pills of Trimetinib and dabrafenib.    Then in the beginning of April her CT scan showed progression mostly in the lymph nodes of the mediastinum and upper abdomen.  We discussed treatment options and interventions.  We discussed going to TECCENTRIQ or resuming dabrafenib and Trimetinib.  She eventually started with the oral pills.  She took them for about a week or so the last dose was sometime on 15 April 2018.  She was admitted at [facility] with fever which were felt to be secondary to these medications.    In May 2018 she was recommended to go back on treatment with dabrafenib and trimatinib but only one dose of each once a week. She is able to take it. Feels dull the day she takes it.  She is also concerned about occasional episodes of headache.  She also is very much concerned that she does not remember her most recent hospitalization where she was admitted at [facility] with fever and chills.    Past Medical History     Past Medical History:   Diagnosis Date     A-fib (H)      Anemia      Cancer of lung (H)      Carotid stenosis      Chronic kidney disease      CKD (chronic kidney disease)      Coronary artery disease      Diabetes mellitus (H) 11/1/2016     Diabetes mellitus, type II (H)      Exacerbation of asthma 4/4/2007     High cholesterol      HTN (hypertension)      Hyperlipidemia      Hypertension      Peripheral neuropathy (H)      Pleural effusion      Primary lung adenocarcinoma (H)     Stage IV NSCLC     Upper GI bleed      Review of Systems   Constitutional  Constitutional (WDL): All constitutional elements are within defined limitsDenies any complaints.  Neurosensory  Neurosensory (WDL): Exceptions to WDL  Ataxia: Asymptomatic, clinical or diagnostic observations only, intervention not indicated  Peripheral Sensory Neuropathy: Asymptomatic, loss of deep tendon reflexes or paresthesia (toes and fingertips no change)  Cardiovascular  Cardiovascular (WDL): Exceptions to WDL  Edema: Yes  Edema Limbs: 5 - 10% inter-limb discrepancy in volume or circumference at point of greatest visible difference, swelling or obscuration of anatomic architecture on close inspection (left ankle)  Pulmonary     Gastrointestinal  Gastrointestinal (WDL): Exceptions to WDL  Dysphagia: Symptomatic, able to eat regular diet  Genitourinary  Genitourinary (WDL): Exceptions to WDL (urgency)  Integumentary  Integumentary (WDL): All integumentary  elements are within defined limits  Patient Coping  Patient Coping: Accepting  ECOG Performance   1  Pain Status  Currently in Pain: No/denies  Accompanied by  Accompanied by: Family Member (daughter)      Vital Signs     Vitals:    06/14/18 1513   BP: (!) 220/91   Pulse: 64   Temp: 97.7  F (36.5  C)   SpO2: 98%       Physical Exam     GENERAL: no acute distress. Cooperative in conversation.   HEENT: Starting to have alopecia. pupils are equal, round and reactive. Oral mucosa is clean and intact. No ulcerations or mucositis noted. No bleeding noted.  RESP:Chest symmetric lungs are clear bilaterally per auscultation. Regular respiratory rate. No wheezes or rhonchi.    CV: Normal S1 S2 Regular, rate and rhythm.   ABD: Nondistended, soft, slight epigastric tenderness. Positive bowel sounds. No organomegaly.   EXTREMITIES: No lower extremity edema.  Noticing slight increased edema around the lower part of her ankles.  NEURO: non focal. Alert and oriented x3.  No obvious neuro deficit.  PSYCH: within normal limits. No depression or anxiety.  SKIN: warm dry intact .      Lab Results     Results for orders placed or performed in visit on 06/13/18   INR   Result Value Ref Range    INR 3.55 (H) 0.90 - 1.10   Comprehensive Metabolic Panel   Result Value Ref Range    Sodium 141 136 - 145 mmol/L    Potassium 4.3 3.5 - 5.0 mmol/L    Chloride 106 98 - 107 mmol/L    CO2 25 22 - 31 mmol/L    Anion Gap, Calculation 10 5 - 18 mmol/L    Glucose 129 (H) 70 - 125 mg/dL    BUN 25 8 - 28 mg/dL    Creatinine 1.15 (H) 0.60 - 1.10 mg/dL    GFR MDRD Af Amer 54 (L) >60 mL/min/1.73m2    GFR MDRD Non Af Amer 45 (L) >60 mL/min/1.73m2    Bilirubin, Total 0.3 0.0 - 1.0 mg/dL    Calcium 10.2 8.5 - 10.5 mg/dL    Protein, Total 6.6 6.0 - 8.0 g/dL    Albumin 3.5 3.5 - 5.0 g/dL    Alkaline Phosphatase 85 45 - 120 U/L    AST 34 0 - 40 U/L    ALT 25 0 - 45 U/L   HM1 (CBC with Diff)   Result Value Ref Range    WBC 7.0 4.0 - 11.0 thou/uL    RBC 4.04 3.80 -  5.40 mill/uL    Hemoglobin 11.3 (L) 12.0 - 16.0 g/dL    Hematocrit 35.8 35.0 - 47.0 %    MCV 89 80 - 100 fL    MCH 28.0 27.0 - 34.0 pg    MCHC 31.6 (L) 32.0 - 36.0 g/dL    RDW 16.9 (H) 11.0 - 14.5 %    Platelets 213 140 - 440 thou/uL    MPV 9.8 8.5 - 12.5 fL    Neutrophils % 66 50 - 70 %    Lymphocytes % 19 (L) 20 - 40 %    Monocytes % 10 2 - 10 %    Eosinophils % 5 0 - 6 %    Basophils % 1 0 - 2 %    Neutrophils Absolute 4.6 2.0 - 7.7 thou/uL    Lymphocytes Absolute 1.4 0.8 - 4.4 thou/uL    Monocytes Absolute 0.7 0.0 - 0.9 thou/uL    Eosinophils Absolute 0.4 0.0 - 0.4 thou/uL    Basophils Absolute 0.1 0.0 - 0.2 thou/uL     *Note: Due to a large number of results and/or encounters for the requested time period, some results have not been displayed. A complete set of results can be found in Results Review.         Distress assessment and ECOG status      ECOG Performance:    ECOG Performance Status: 1    Distress Assessment  Distress Assessment Score: 5 (adjusting to life)       Imaging Results       No results found.        Signed by: Jemal Evans MD

## 2021-06-18 NOTE — PROGRESS NOTES
INR results were reviewed with Kay CHANEL. No change to patient's coumadin: 4 mg daily. INR is to be rechecked in 3-4 weeks.    I spoke with Blanca, she verbalized understanding and appreciation of our conversation. She will return on 7/18. [place],  has been made aware.

## 2021-06-18 NOTE — PROGRESS NOTES
Patient calls in today wondering if she is able to go to [place] to see her other daughter for a while.  She tells me that she started her oral chemotherapy back on 5/7/18 and takes one pill of each medication once a week.  She is feeling great with no concerns.  She wants an order for INR to be drawn while she is there.  According to Dr Evans's note, she should be seen a few weeks after she starts the medications.  Since Dr Evans is out, I asked Kay when she should be seen or if she needs to be seen at all as she is requesting that she not see anyone this week or next.  Per Kay Kim, CNP she can come in for labs today or tomorrow.  We can review the labs and let her know if she can leave tomorrow or the next day on a flight to [place].  [place] plans to call her to see if she can come in.    Francisca Ortiz RN 5/22/18 [id number]

## 2021-06-18 NOTE — PROGRESS NOTES
ASSESSMENT: Onychauxis, diabetes with neurologic manifestations, foot pain.    PLAN: Toenails were debrided x10. Return to clinic in nine weeks.         SUBJECTIVE: The patient returns to the [facility] for help with her toenails which are long, thick and painful. She has altered sensation in fingers and toes following chemotherapy treatments for cancer.  She is also diabetic. She denies any foot trauma.  Last clinic visit was then March 12, 2018.    OBJECTIVE:  General: Pleasant 84 y.o. female in no acute distress.  Vascular: DP pulses are palpable. PT pulses are palpable. Pedal hair is diminished. Feet are cool to the touch.  Neuro: Sensation in the feet is altered. Patient describes paresthesias.  Derm: Toenails are elongated, thickened and dystrophic with discoloration and subungual debris. Skin is thin and shiny but intact.   Musculoskeletal: Hammertoes.

## 2021-06-19 NOTE — PROGRESS NOTES
[facility] Progress Note    Patient: Blanca Oreilly  MRN: [id number]  Date of Service: 8/15/2018           Reason for visit      1. Malignant neoplasm of upper lobe bronchus, right, PD-L1 and [id number] mutation positive.    NSCLC; EGFR and ALK-1 negative. PD-L1 strongly positive for both 22C3, 28-8    Assessment     1. Very pleasant 84 y.o. woman with stage IV NSCLC, adenocarcinoma. Negative for EGFR  And ALK-1. We also checked PD-L1 and she is strongly positive.  On strata testing she has BRA [id number] mutation.  November 2017 PET scan suggestive of liver metastases as well as mediastinal lymph node metastases.  She was prescribed and took few days of dabrafenib and Trimetinib in the second week of December 2017.  Stopped after few days.  Surprisingly her PET scan in January 2018 showed very good response.  Current CT scan in the beginning of April showed progression.  She resumed dabrafenib and Trimetinib.  Took them for a few days.  Then stopped but in April CT scan again shows that she has responded to the medication. So in May 2018 she went  back on treatment with dabrafenib and trimatinib but only one dose of each once a week.  In June 2018 frequency increased to 1 dose of each pill every 6 day.  Then in July 2018 she increase the frequency of taking one dose of each medication every fifth day.  She is able to tolerate that with tolerable side effects.  Current CT scan shows mild improvement in the mediastinal adenopathy.  2.  History of prior treatment include 4 cycles of carboplatin, taxol and avastin. Then was on Alimta and Avastin.  Later on she has had repeat treatment with 4 cycles Carboplatin and Taxol. PET scan showed good response.  Started on Pembrolizumab but after 4 cycles there was some progression.  She then tried and progressed on Opdivo.  Most Recently she was on Abraxane from November 2016 until April 2017. PET scan showed complete response. Stopped due to side  effects. Her May 2017 PET scan showed progression.  She was on Atezulizumab from end of May beginning of October 2017. PET scan in November 2017 showed worsening of the liver metastases.   3. Gastric ulcer pathology is consistent with non-small cell lung cancer.  Clinically she seems to be doing well on that her hemoglobin is stable and she has not had any more blood in her stools.  4. Elevated creatinine.  5. History of DVT for which she is on anticoagulation.  INR is supratherapeutic.  6. Anxiety and depression.  Seems to be better.  7. 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    Plan     1. At this time I would recommend continuing treatment with dabrafenib and trimatinib but at this time I would recommend taking the full dose of each that is twice a day of dabrafenib and once a day of Trimetinib once every fifth day.  Then if tolerated increase the frequency of taking medication to once every 4 days.  Over goal is to work our way up to the maximum tolerated dose.  2. RTC in 3 months with a scan.    3. Continue anticoagulation per plan.  Decrease the dose of Coumadin to 3 mg daily after holding it for couple of days.  4. Call if she has any other new symptoms.    Clinical stage      Lung cancer, Right side    Primary site: Lung (Right)    Staging method: AJCC 7th Edition    Clinical: Stage IV (T4, N0, M1a) signed by Jemal Evans MD on 10/20/2014  2:19 PM    Summary: Stage IV (T4, N0, M1a)      History     Blanca Oreilly is a very pleasant 84 y.o. old female with a history of  non-small cell lung cancer located in the right lung measuring 5.7 cm in size presenting with some shortness of breath associated with malignant pleural effusion in the month of October 2014.  Pleural tap was positive for malignant cells adenocarcinoma in nature.  CK 7 positive CK 20 negative TTF-1 positive.  Subsequently that she was admitted due to worsening shortness of breath and her  pleural effusion was tapped . She had Pleurx catheter placed but then removed as pleural fluid has reduced to few cc per week.   Her PET scan done at [facility] showed disease limited to thorax only. She has started on chemotherapy with carboplatin and Taxol with avastin. Has had 4 doses. Her PET scan showed nearly complete response. So we started her on Alimta and Avastin. She started that in February 2015.    She had CT in September 2015 which showed some worsening of subcrinal LN. Then had pet scan. That shows progression. Started back on Carboplatin and Taxol. We did give her Avastin but then we had to hold it due to protienuria. CT scan after 2 cycles showed improvement. PET scan after 4 showed partial response. She was tested for PD-L1 and was positive for both opdivo and Pembrolizumab. So we started on Pembrolizumab in late January 2016.  However PET scan after 3 cycles showed progression. So in April we started on Opdivo.    Was in hospital in the beginning of September 2016 with GI bleed and was found to have gastric ulcer.  She was started on PPI. Coumadin stopped.  In October 2016 her PET scan  showed progression.  Opdivo has been stopped.  Her PET scan also showed some fullness on her kidney.  Her creatinine had gone up.  She was then admitted and started on some IV fluids and seen by nephrology.  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  High doses of steroids actually give her diabetes.  In the meantime she also lost her  was battling myelodysplastic syndrome.  She had a follow-up endoscopy for the gastric ulcer and that came back positive for adenocarcinoma consistent with lung primary.  She has been started on salvage chemotherapy with single agent Abraxane.  She had 1 cycle in November.  She is here for her next cycle.    She is managing her diabetes well.  Dr. Mayo started her on insulin.  Her  blood sugars are getting better.    Tolerated Abraxane well. The scan after 3 cycles showed significant improvement in the mediastinal as well as abdominal lymphadenopathy.   She did have an abnormal stress test for which she ended up being in the hospital.  A reversible ischemic defect was noted.  She has been put on nitrates.  She is otherwise doing well.  She has not had any EGD since starting her chemotherapy.  She had PET scan in March which actually showed complete response.    She continued Abraxane however her neuropathy seems to have gotten worse.  We stopped treatment in April 2017.     PET scan in May 2017 showed progression. I recommended trying Tecentriq which she started end of May 2017.   PET scan in August 2017 showed partial response.  However in late September 2017 she started to have elevation of her liver function test which required me to stop her treatment.  In spite of high dose of prednisone her liver function test continue to get worse.  She had PET scan done November 2017 which showed that she had developed new liver metastases.      Due to the fact that her tumor molecular profiling on strata testing showed that she had BRAF   mutation I prescribed dabrafenib and Trimetinib.  She started those medications sometime in the first of the second week of December 2017. She was diagnosed to have a DVT in her left calf on December 7, 2017.  She has been started on anticoagulation.  She eventually was unable to handle those medications and stopped them I believe 12 December 2017.  Her PET scan in January 2018 actually showed that she had significant improvement in her disease which was surprising.  In my opinion it most likely represented delayed response to TECCENTRIQ or some spontaneous regression.  My impression at that time was that it is unlikely that she responded to few pills of Trimetinib and dabrafenib.    Then in the beginning of April her CT scan showed progression mostly in the  lymph nodes of the mediastinum and upper abdomen.  We discussed treatment options and interventions.  We discussed going to TECCENTRIQ or resuming dabrafenib and Trimetinib.  She eventually started with the oral pills.  She took them for about a week or so the last dose was sometime on 15 April 2018.  She was admitted at [facility] with fever which were felt to be secondary to these medications.    In May 2018 she was recommended to go back on treatment with dabrafenib and trimatinib but only one dose of each once a week. She is able to take it.  Then we decreased the time between the doses to every 6 day in middle of June 2018.  Feels dull the day she takes it.      She did have an episode of swelling in her left leg and was seen by Dr. Diaz.  Had ultrasound done was negative for DVT.  She continues on Coumadin.    Comes in today for scheduled follow-up after CT scan.    Past Medical History     Past Medical History:   Diagnosis Date     A-fib (H)      Anemia      Cancer of lung (H)      Carotid stenosis      Chronic kidney disease      CKD (chronic kidney disease)      Coronary artery disease      Diabetes mellitus (H) 11/1/2016     Diabetes mellitus, type II (H)      Exacerbation of asthma 4/4/2007     High cholesterol      HTN (hypertension)      Hyperlipidemia      Hypertension      Peripheral neuropathy (H)      Pleural effusion      Primary lung adenocarcinoma (H)     Stage IV NSCLC     Upper GI bleed      Review of Systems   Constitutional  Constitutional (WDL): Exceptions to WDLDenies any complaints.  Neurosensory  Neurosensory (WDL): Exceptions to WDL  Peripheral Motor Neuropathy: Asymptomatic, clinical or diagnostic observations only, intervention not indicated  Ataxia: Asymptomatic, clinical or diagnostic observations only, intervention not indicated  Peripheral Sensory Neuropathy: Moderate symptoms, limiting instrumental ADL (hands and feet and around mouth)  Cardiovascular  Cardiovascular  (WDL): Exceptions to WDL  Palpitations: Definition: A disorder characterized by inflammation of the muscle tissue of the heart. (hx a-fib)  Pulmonary     Gastrointestinal  Gastrointestinal (WDL): Exceptions to WDL  Diarrhea: Increase of <4 stools per day over baseline, mild increase in ostomy output compared to baseline (imodium daily)  Dysphagia: Symptomatic, able to eat regular diet  Genitourinary  Genitourinary (WDL): All genitourinary elements are within defined limits  Integumentary  Integumentary (WDL): All integumentary elements are within defined limits  Patient Coping  Patient Coping: Accepting  ECOG Performance   1  Pain Status  Currently in Pain: No/denies  Accompanied by  Accompanied by: Family Member (daughter)      Vital Signs     Vitals:    08/15/18 1038   BP: 149/66   Pulse: 63   Temp: 97.6  F (36.4  C)   SpO2: 97%       Physical Exam     GENERAL: no acute distress. Cooperative in conversation.   HEENT: Starting to have alopecia. pupils are equal, round and reactive. Oral mucosa is clean and intact. No ulcerations or mucositis noted. No bleeding noted.  RESP:Chest symmetric lungs are clear bilaterally per auscultation. Regular respiratory rate. No wheezes or rhonchi.    CV: Normal S1 S2 Regular, rate and rhythm.   ABD: Nondistended, soft, slight epigastric tenderness. Positive bowel sounds. No organomegaly.   EXTREMITIES: No lower extremity edema.  Noticing slight increased edema around the lower part of her ankles.  NEURO: non focal. Alert and oriented x3.  No obvious neuro deficit.  PSYCH: within normal limits. No depression or anxiety.  SKIN: warm dry intact .      Lab Results     Results for orders placed or performed in visit on 08/15/18   INR   Result Value Ref Range    INR 4.87 (H) 0.90 - 1.10   Comprehensive Metabolic Panel   Result Value Ref Range    Sodium 142 136 - 145 mmol/L    Potassium 4.5 3.5 - 5.0 mmol/L    Chloride 107 98 - 107 mmol/L    CO2 28 22 - 31 mmol/L    Anion Gap,  Calculation 7 5 - 18 mmol/L    Glucose 207 (H) 70 - 125 mg/dL    BUN 38 (H) 8 - 28 mg/dL    Creatinine 1.43 (H) 0.60 - 1.10 mg/dL    GFR MDRD Af Amer 42 (L) >60 mL/min/1.73m2    GFR MDRD Non Af Amer 35 (L) >60 mL/min/1.73m2    Bilirubin, Total 0.3 0.0 - 1.0 mg/dL    Calcium 9.7 8.5 - 10.5 mg/dL    Protein, Total 6.3 6.0 - 8.0 g/dL    Albumin 3.5 3.5 - 5.0 g/dL    Alkaline Phosphatase 99 45 - 120 U/L    AST 46 (H) 0 - 40 U/L    ALT 38 0 - 45 U/L     *Note: Due to a large number of results and/or encounters for the requested time period, some results have not been displayed. A complete set of results can be found in Results Review.         Distress assessment and ECOG status      ECOG Performance:    ECOG Performance Status: 1    Distress Assessment  Distress Assessment Score: 2       Imaging Results       Ct Chest Without Contrast    Result Date: 8/13/2018  CT CHEST WO CONTRAST 8/13/2018 10:28 AM INDICATION: Neoplasm: Chest, metastatic, rx monitor or f/u. TECHNIQUE: Routine chest. Dose reduction techniques were used. IV CONTRAST: None. COMPARISON: 05/01/2018. FINDINGS: LUNGS AND PLEURA: Nodular opacity in the medial right middle lobe abutting the mediastinum is unchanged. Scattered small pulmonary nodules, the largest in the right lower lobe on image 80, unchanged. MEDIASTINUM: Mediastinal lymph nodes have decreased in size. A right paratracheal lymph node previously measuring 9 mm currently measures 7 mm. An anterior mediastinal lymph node previously measuring 14 mm, currently measures 11 mm. Atherosclerotic, including coronary artery calcification. LIMITED UPPER ABDOMEN: Atrophic right kidney. MUSCULOSKELETAL: Negative.     CONCLUSION: 1.  Improved mediastinal lymphadenopathy compared to 05/01/2018.           Signed by: Jemal Evans MD

## 2021-06-19 NOTE — PROGRESS NOTES
"    ASSESSMENT:  1.  Left leg swelling: She is on warfarin due to a history of deep venous thrombosis.  Since she has metastatic cancer, I would be concerned about risks for recurrent DVT despite a therapeutic INR of 2.85.  A d-dimer will be checked.  If abnormal, she would require a venous ultrasound.    2.  Type 2 diabetes mellitus: She was given a sliding scale to adjust insulin.  It was emphasized that low blood sugars are a more major issue than high.  Conservative use of the sliding scale was emphasized.    3.  Labile hypertension: Blood sugars have been somewhat higher lately, particularly when she is in Dr. Evans's office.  Potential adjustments in medications were discussed    4.  Metastatic adenocarcinoma of the lung: She is followed very closely by Dr. Evans    PLAN:  1.  D-dimer was done and returned elevated.  2.  A venous ultrasound of the leg was done \"read and call \"due to the elevated d-dimer.  This returned negative for acute DVT.  Barney was contacted with results.  She will continue warfarin with goal INR 2-3  3.  Hemoglobin A1c was done and returned in an acceptable range at 7.0.  Sliding scale for insulin was reviewed.  She has noted hypoglycemia with going up to 6 units of short acting  insulin.  She was advised to limit this to 4 units.  4.  Add lisinopril 10 mg daily.  Monitor blood pressure watch for cough  5.  She notes an elevation in blood pressure with anxiousness and oncology clinic visits.  May wish to try lorazepam and stressful situations to see if this helps bluntly spike in blood pressure.  6.   Episode of memory loss: This occurred when she was acutely ill.  She was reassured that this is a common response to serious illness.  7.  Clinic follow-up 6-8 weeks for blood pressure recheck  Orders Placed This Encounter   Procedures     Glycosylated Hemoglobin A1c     D-dimer, Quantitative     Medications Discontinued During This Encounter   Medication Reason     lisinopril " (PRINIVIL,ZESTRIL) 10 MG tablet Therapy completed           ASSESSED PROBLEMS:  1. Diabetes mellitus, type 2 (H)  Glycosylated Hemoglobin A1c   2. Benign essential hypertension  lisinopril (PRINIVIL,ZESTRIL) 10 MG tablet   3. Anxiety  LORazepam (ATIVAN) 0.5 MG tablet   4. Left leg swelling  D-dimer, Quantitative       CHIEF COMPLAINT:  Chief Complaint   Patient presents with     Leg Swelling     Pt has been having leg swelling since yesterday that she would like evaluated. She is also having increased memory issues.        HISTORY OF PRESENT ILLNESS:  Blanca is a 84 y.o. female presenting to the clinic today for leg swelling and memory loss. She is accompanied by her daughter, Rhea. She continues with treatment for non-small-cell lung cancer, stage IV. She is seen by Dr. Evans for oncology.     She reports an episode of extended memory loss. She says that she has no memory after feeing very angry waiting in the lobby of the ER in April. She was hospitalized from 4/17 - 4/20/18 for fever. She does not recall several visits to Dr. Evans or her last visit to the clinic on 4/24/18.  Her daughter did not think her mother seemed different during this period.     Hypertension: Her blood pressure today is 160/60 and MD recheck is 144/58. She continues on amlodipine 5mg, isosorbide 30mg, and metoprolol 75mg daily. She reports home blood pressure readings of 157 systolic today and 149 yesterday. She mentions another reading of 119. She is particularly bothered that every time she sees Dr. Evans, her blood pressure is over 200. She says she is not nervous to see the doctor but admits that when she goes she is anxious about hearing test results and her prognosis.      Edema: She has leg swelling on the left which started yesterday. She has a history of blood clot on the left. She blames the swelling on the current heat. She has had swelling in the past.     History of DVT: On the left. She is anticoagulated on Coumadin.  "    Diabetes: She is not using a sliding scale for Novolog. She has noticed that if she injects 4 units, she feels well, but at 6 units her blood sugar is too low. She says with a blood sugar of 60, she feels terrible. She is also taking Lantus 6 units at bedtime,     REVIEW OF SYSTEMS:   She has previously been prescribed lorazepam for anxiety but told her daughter she did not like the effects.   All other systems are negative.    PFSH:  .  She comes to clinic with her daughter    TOBACCO USE:  History   Smoking Status     Former Smoker     Packs/day: 0.10     Years: 30.00     Quit date: 10/30/1980   Smokeless Tobacco     Never Used     Comment: Not a steady smoker       VITALS:  Vitals:    07/13/18 1034 07/13/18 1130   BP: 160/60 144/58   Patient Site: Left Arm Right Arm   Patient Position: Sitting Sitting   Cuff Size: Adult Regular    Pulse: (!) 56    Weight: 113 lb 3.2 oz (51.3 kg)      Wt Readings from Last 3 Encounters:   07/13/18 113 lb 3.2 oz (51.3 kg)   06/14/18 110 lb 4.8 oz (50 kg)   05/14/18 109 lb (49.4 kg)     Body mass index is 22.86 kg/(m^2).      MEDICATIONS:  Current Outpatient Prescriptions   Medication Sig Dispense Refill     amLODIPine (NORVASC) 5 MG tablet Take 5 mg by mouth daily.       atorvastatin (LIPITOR) 40 MG tablet TAKE 1 TABLET BY MOUTH DAILY. 90 tablet 2     BD ULTRA-FINE JOHN PEN NEEDLES 32 gauge x 5/32\" Ndle USE 4 TIMES DAILY AS DIRECTED WITH INSULIN 100 each 5     blood glucose test (CONTOUR NEXT STRIPS) strips Use 1 each As Directed 5 (five) times a day. Dispense brand per patient's insurance at pharmacy discretion. 450 strip 7     dabrafenib 75 mg cap Take 150 mg by mouth 2 (two) times a day. (Patient taking differently: Take 150 mg by mouth 2 (two) times a day. Taking once weekly on Mondays) 120 capsule 3     INSULIN ASPART (NOVOLOG FLEXPEN SUBQ) Inject 0-8 Units under the skin 3 (three) times a day before meals. Dose per sliding scale  BG less than 150 = 0 units  151-200 " = 4 units  201- 250 = 6 units  > 251 = 8 units       insulin aspart U-100 (NOVOLOG FLEXPEN U-100 INSULIN) 100 unit/mL injection pen Inject 4 Units under the skin 3 (three) times a day before meals. 15 Pre-filled Pen Syringe 0     insulin glargine (LANTUS; BASAGLAR) 100 unit/mL (3 mL) pen Inject 6 Units under the skin at bedtime. 5 adj dose pen 0     isosorbide mononitrate (IMDUR) 30 MG 24 hr tablet TAKE 1 TABLET (30 MG TOTAL) BY MOUTH DAILY. 90 tablet 1     loperamide (IMODIUM) 2 mg capsule Take 2 mg by mouth 4 (four) times a day as needed for diarrhea.       magnesium 250 mg Tab Take 500 mg by mouth daily.        metoprolol succinate (TOPROL-XL) 50 MG 24 hr tablet Take 75 mg by mouth daily. 1 and 1/2 tablets       multivitamin (ONE A DAY) per tablet Take 1 tablet by mouth daily.       nitroglycerin (NITROSTAT) 0.4 MG SL tablet Place 1 tablet (0.4 mg total) under the tongue every 5 (five) minutes as needed for chest pain. 10 tablet 1     ranitidine (ZANTAC) 150 MG tablet Take 150 mg by mouth 2 (two) times a day.       trametinib (MEKINIST) 2 mg Tab Take 2 mg by mouth daily. (Patient taking differently: Take 2 mg by mouth daily. Taking one day weekly on Mondays) 30 tablet 3     warfarin (COUMADIN) 4 MG tablet Take 4-4.5 mg by mouth See Admin Instructions. 4 mg daily       lisinopril (PRINIVIL,ZESTRIL) 10 MG tablet Take 1 tablet (10 mg total) by mouth daily. 30 tablet 11     LORazepam (ATIVAN) 0.5 MG tablet Take 1 tablet (0.5 mg total) by mouth every 8 (eight) hours as needed for anxiety. 30 tablet 0     No current facility-administered medications for this visit.        PHYSICAL EXAM:  Constitutional:   Reveals an alert, talkative, slender, pale woman. Affect appropriate. Does not seem acutely ill. Vitals: per nursing notes. MD recheck of BP is 144/58, right arm, sitting.   Neck:  No adenopathy or thyromegaly.   Back:  No spine or CVA pain.  Thorax:  No bony deformities.  Lungs: Clear to A&P without rales or wheezes.   Respiratory effort normal.  Cardiac:   Bradycardic. Regular rhythm. Normal S1, S2.  Abdomen:  Soft, non-tender. Active bowel sounds.  Extremities:  1+ edema left leg. No significant edema. No palpable cords. No foot ulcers.     Skin:  Somewhat pale.   Neuro:  Alert and talkative. No gross focal deficits.    ADDITIONAL HISTORY SUMMARIZED (2): Reviewed Dr. Evans's note of 6/14/18.  DECISION TO OBTAIN EXTRA INFORMATION (1): None.   RADIOLOGY TESTS (1): None.  LABS (1): Labs ordered.   MEDICINE TESTS (1): None.  INDEPENDENT REVIEW (2 each): None.     The visit lasted a total of 26 minutes face to face with the patient. Over 50% of the time was spent counseling and educating the patient about memory loss associated with illness.    I, Chauncey Yip, am scribing for and in the presence of, Dr. Garcia.    IMarco, personally performed the services described in this documentation, as scribed by Chauncey Yip in my presence, and it is both accurate and complete.    Dragon dictation was used for this note.  Speech recognition errors are a possibility.      Total data points: 5

## 2021-06-20 NOTE — PROGRESS NOTES
INR result reviewed with Dr. Whitehead. Blanca is to do the followin mg on tues and thurs, 3 mg all other days. She verbalized understanding and appreciation of our conversation. INR is to be rechecked on .

## 2021-06-20 NOTE — PROGRESS NOTES
ASSESSMENT: Onychomycosis onychauxis, diabetes mellitus      PLAN: Toenails were debrided x10.            SUBJECTIVE: The patient returns to the  clinic for continued foot care complaining of long thick painful nails both feet.  She has altered sensation in fingers and toes following chemotherapy treatments for cancer.  She is also diabetic.       OBJECTIVE:  General: Pleasant 84 y.o. female in no acute distress.  Vascular: DP pulses are palpable. PT pulses are palpable. Pedal hair is diminished. Feet are cool to the touch.  Neuro: Sensation in the feet is altered. Patient describes paresthesias.  Derm: Toenails are elongated, thickened and dystrophic with discoloration and subungual debris. Skin is thin and shiny but intact.   Musculoskeletal: Hammertoes.     ASSESSMENT: Onychauxis, diabetes mellitus .     PLAN: Toenails were debrided x10.

## 2021-06-21 NOTE — PROGRESS NOTES
"ASSESSMENT:  1. Benign essential hypertension  Blanca reports home blood pressure readings have been fairly good.  She is consistently high both here and in oncology clinic suggesting a component of \"whitecoat\" hypertension.  Actually has noted lower readings with taking lorazepam prior to oncology appointments.  I would be reluctant to be too aggressive with medications due to risks for hypotension  - lisinopril (PRINIVIL,ZESTRIL) 10 MG tablet; Take 1 tablet (10 mg total) by mouth daily.  Dispense: 90 tablet; Refill: 3    2. Diabetes mellitus, type 2 (H)  She has noted higher blood sugars with her new chemotherapy.  Sugars are quite labile.  I would favor rather loose control to avoid the risk of hypoglycemia.  Basic metabolic panel and hemoglobin A1c will be checked today  - Glycosylated Hemoglobin A1c  - Basic Metabolic Panel    3.  Metastatic adenocarcinoma of the lung:  Managed by Dr. Evans.  She actually looks quite good today.  She has a new PET scan scheduled for the near future    PLAN:  Patient Instructions   1.  I will contact you with labs.  If hemoglobin A1c is substantially elevated, a slight small adjustment in insulin would be advised.    2.  Same insulin for now    3.  See in three months.      Orders Placed This Encounter   Procedures     Glycosylated Hemoglobin A1c     Basic Metabolic Panel     Medications Discontinued During This Encounter   Medication Reason     lisinopril (PRINIVIL,ZESTRIL) 10 MG tablet Reorder             ASSESSED PROBLEMS:  1. Diabetes mellitus, type 2 (H)  Glycosylated Hemoglobin A1c    Basic Metabolic Panel   2. Benign essential hypertension  lisinopril (PRINIVIL,ZESTRIL) 10 MG tablet       CHIEF COMPLAINT:  Chief Complaint   Patient presents with     Medication Management     follow up, want lisinopril to 3 months supply       HISTORY OF PRESENT ILLNESS:  Blanca is a 84 y.o. female presenting to the clinic today for DMII management.   She is accompanied by her daughter. " "    DMII: Her blood sugars have been high. The medication she is on now is hard on her. She wanted to talk about a different insulin. Blood sugar numbers have been all over the place, because she eats what she wants when she wants but does not think she has had any hypoglycemic episodes.    Neuropathy:  She is currently also trying to adjust to a new medication from her oncologist that makes her lips feel hard. Sometimes she has a hard time talking on the phone. Her lips get immobile.   She is used to some neuropathy, but this is a neuropathy that can come and go. She thinks maybe it is related to anxiety a little bit. She would prefer to be off all medication, and is taking only a half dose of it now.     Stomache: It may have been the pills, and she is ok today, but the last two mornings she got a strong sudden nausea after taking her medications, but without emesis.   .   Blood sugar numbers have been all over the place, because she eats what she wants when she wants but does not think she has had any hypoglycemic episodes.     Adenocarinoma of the Lung: Seeing  again on Nov 14th. As of 3 weeks ago he knows she Is only taking a 1/2 dose of her medications and taking it every day. She says \"it's really working on her mind,\" making her confused and foggy. She still lives alone and takes care of herself, but it fogs her brain, and per her daughter, it affects her short term memory.     Abdominal Pain: She has had abdominal pain for the last few months that started in [facility] and has migrated to [facility] she relates this to her long history with metastatic adenocarcinoma of the lung. She has a CT scheduled.     HTN: Her BP measures in clinic had systolic pressures at 144, 170 , and 190. However per patient her BP at home is 110-120 systolic. Per daughter sytolic about 140 last time she took all her medications for HTN before having a clinic visit. They agree her BPs get much higher while in Dr. Shetty.     REVIEW OF " SYSTEMS:   She is feeling well today.   All other systems are negative.    PFSH:  Reviewed as below.     TOBACCO USE:  History   Smoking Status     Former Smoker     Packs/day: 0.10     Years: 30.00     Quit date: 10/30/1980   Smokeless Tobacco     Never Used     Comment: Not a steady smoker       VITALS:  Vitals:    10/26/18 1142   BP: 144/60   Patient Site: Left Arm   Patient Position: Sitting   Cuff Size: Adult Regular   Pulse: 60   SpO2: 99%   Weight: 112 lb (50.8 kg)   Repeat BP in office at 170/82, then again 190 systolic.     Wt Readings from Last 3 Encounters:   10/26/18 112 lb (50.8 kg)   08/15/18 114 lb 11.2 oz (52 kg)   07/13/18 113 lb 3.2 oz (51.3 kg)     Body mass index is 22.62 kg/(m^2).    PHYSICAL EXAM:  Constitutional:   Reveals an alert talkative woman in good spirits, in no acute distress with affect appropriate. Vitals: per nursing notes. 170/82 on repeat BP, then 190 systolic.   HEENT: atraumatic Ears:  External canals, TMs clear.    Eyes:  EOMs full, PERRL.  Oropharynx:  Lips are very dry Mouth and throat clear, no thrush or exudate.  Neck:  Supple, no carotid bruits or adenopathy.  Back:  No spine or CVA pain.  Thorax:  No bony deformities.  Lungs: Clear to A&P without rales or wheezes.  Respiratory effort normal.  Cardiac:   Regular rate and rhythm, normal S1, S2, no murmur or gallop.  Abdomen:  Soft, active bowel sounds without bruits, mass, or tenderness.  Extremities:   No peripheral edema, pulses in the feet intact.    Skin:  No jaundice, peripheral cyanosis or lesions to suggest malignancy.  Neuro:  Alert and oriented. Cranial nerves, motor, sensory exams are intact.  No gross focal deficits.  Psychiatric:  Memory intact, mood appropriate.    QUALITY MEASURES:  I have had an Advance Directives discussion with the patient.  ADDITIONAL HISTORY SUMMARIZED (2): 8/15/18 heme onc note reviewed showing ongoing treatments, reviewed Pharm D note 2/26/18, 3/26/18 showing ongoing medical management  "review.   DECISION TO OBTAIN EXTRA INFORMATION (1): None.   RADIOLOGY TESTS (1): None.  LABS (1): labs 10/22/18, 10/11/18, 9/27/18 reviewed and ordered labs today.   MEDICINE TESTS (1): None.  INDEPENDENT REVIEW (2 each): None.     The visit lasted a total of 21 minutes face to face with the patient. Over 50% of the time was spent counseling and educating the patient about her diabetes management.    I, Kamar Ordaz, am scribing for and in the presence of, Dr. Garcia.    IMarco, personally performed the services described in this documentation, as scribed by Kamar Ordaz in my presence, and it is both accurate and complete.    Dragon dictation was used for this note.  Speech recognition errors are a possibility.    MEDICATIONS:  Current Outpatient Prescriptions   Medication Sig Dispense Refill     amLODIPine (NORVASC) 5 MG tablet Take 5 mg by mouth daily.       atorvastatin (LIPITOR) 40 MG tablet TAKE 1 TABLET BY MOUTH DAILY. 90 tablet 2     BASAGLAR KWIKPEN U-100 INSULIN 100 unit/mL (3 mL) pen INJECT 6 UNITS UNDER THE SKIN AT BEDTIME. 15 adj dose pen 0     BD ULTRA-FINE JOHN PEN NEEDLES 32 gauge x 5/32\" Ndle USE 4 TIMES DAILY AS DIRECTED WITH INSULIN 100 each 5     blood glucose test (CONTOUR NEXT STRIPS) strips Use 1 each As Directed 5 (five) times a day. Dispense brand per patient's insurance at pharmacy discretion. 450 strip 7     dabrafenib 75 mg cap Take 150 mg by mouth 2 (two) times a day. 120 capsule 3     INSULIN ASPART (NOVOLOG FLEXPEN SUBQ) Inject 0-8 Units under the skin 3 (three) times a day before meals. Dose per sliding scale  BG less than 150 = 0 units  151-200 = 4 units  201- 250 = 6 units  > 251 = 8 units       isosorbide mononitrate (IMDUR) 30 MG 24 hr tablet TAKE 1 TABLET (30 MG TOTAL) BY MOUTH DAILY. 90 tablet 1     lisinopril (PRINIVIL,ZESTRIL) 10 MG tablet Take 1 tablet (10 mg total) by mouth daily. 90 tablet 3     loperamide (IMODIUM) 2 mg capsule Take 2 mg by mouth daily.    "     LORazepam (ATIVAN) 0.5 MG tablet Take 1 tablet (0.5 mg total) by mouth every 8 (eight) hours as needed for anxiety. 30 tablet 0     magnesium 250 mg Tab Take 500 mg by mouth daily.        metoprolol succinate (TOPROL-XL) 50 MG 24 hr tablet TAKE 1.5 TABLETS BY MOUTH NIGHTLY AT BEDTIME. 135 tablet 3     multivitamin (ONE A DAY) per tablet Take 1 tablet by mouth daily.       nitroglycerin (NITROSTAT) 0.4 MG SL tablet Place 1 tablet (0.4 mg total) under the tongue every 5 (five) minutes as needed for chest pain. 10 tablet 1     NOVOLOG FLEXPEN U-100 INSULIN 100 unit/mL injection pen INJECT 4 UNITS UNDER THE SKIN 3 TIMES A DAY BEFORE MEALS. 15 Pre-filled Pen Syringe 0     ranitidine (ZANTAC) 150 MG tablet Take 150 mg by mouth 2 (two) times a day.       trametinib (MEKINIST) 2 mg Tab Take 2 mg by mouth daily. Taking one day weekly on Mondays 30 tablet 3     warfarin (COUMADIN) 4 MG tablet Take 4-4.5 mg by mouth See Admin Instructions. 4 mg daily       No current facility-administered medications for this visit.        Total data points:3

## 2021-06-21 NOTE — PROGRESS NOTES
[facility] Hematology and Oncology Progress Note    Patient: Blanca Oreilly  MRN: [id number]  Date of Service: 11/14/2018           Reason for visit      1. Lung cancer (H)    2. Malignant neoplasm of upper lobe bronchus, right, PD-L1 and [id number] mutation positive.    NSCLC; EGFR and ALK-1 negative. PD-L1 strongly positive for both 22C3, 28-8    Assessment     1. Very pleasant 85 y.o. woman with stage IV NSCLC, adenocarcinoma. Negative for EGFR  And ALK-1. We also checked PD-L1 and she is strongly positive.  On strata testing she has BRA [id number] mutation.  November 2017 PET scan suggestive of liver metastases as well as mediastinal lymph node metastases.  She was prescribed and took few days of dabrafenib and Trimetinib in the second week of December 2017.  Stopped after few days.  Surprisingly her PET scan in January 2018 showed very good response.  Current CT scan in the beginning of April showed progression.  She resumed dabrafenib and Trimetinib.  Took them for a few days.  Then stopped but in April 2018 CT scan again shows that she has responded to the medication. So in May 2018 she went  back on treatment with dabrafenib and trimatinib but only one dose of each once a week.  In June 2018 frequency increased to 1 dose of each pill every 6 day.  Then in July 2018 she increase the frequency of taking one dose of each medication every fifth day.  Then she went to half the dose of Dabarafenib (75mg two times a day ) and full dose of Trametinib (2mg daily). She is able to tolerate that with tolerable side effects.  Current CT scan shows good improvement in the mediastinal adenopathy.  2.  History of prior treatment include 4 cycles of carboplatin, taxol and avastin. Then was on Alimta and Avastin.  Later on she has had repeat treatment with 4 cycles Carboplatin and Taxol. PET scan showed good response.  Started on Pembrolizumab but after 4 cycles there was some progression.  She then tried and progressed on  Opdivo.  Then she was on Abraxane from November 2016 until April 2017. PET scan showed complete response. Stopped due to side effects. Her May 2017 PET scan showed progression.  She was on Atezulizumab from end of May beginning of October 2017. PET scan in November 2017 showed worsening of the liver metastases.  Currently on dabrafenib and Trimetinib.  3. Gastric ulcer pathology is consistent with non-small cell lung cancer.  Clinically she seems to be doing well on that her hemoglobin is stable and she has not had any more blood in her stools.  4. Elevated creatinine.  5. History of DVT for which she is on anticoagulation.  INR is supratherapeutic.  6. Anxiety and depression.  Seems to be better.  7. 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    Plan     1. At this time I would recommend continuing treatment with dabrafenib and trimatinib at the same dose which is half the dose of Dabarafenib (75mg two times a day ) and full dose of Trametinib (2mg daily).  2. RTC in 3 months with a scan.    3. Continue anticoagulation per plan.  Decrease the dose of Coumadin to 3 mg daily after holding it for couple of days.  4. Call if she has any other new symptoms.    Clinical stage      Lung cancer, Right side    Primary site: Lung (Right)    Staging method: AJCC 7th Edition    Clinical: Stage IV (T4, N0, M1a) signed by Jemal Evans MD on 10/20/2014  2:19 PM    Summary: Stage IV (T4, N0, M1a)      History     Blanca Oreilly is a very pleasant 85 y.o. old female with a history of  non-small cell lung cancer located in the right lung measuring 5.7 cm in size presenting with some shortness of breath associated with malignant pleural effusion in the month of October 2014.  Pleural tap was positive for malignant cells adenocarcinoma in nature.  CK 7 positive CK 20 negative TTF-1 positive.  Subsequently that she was admitted due to worsening shortness of breath and her pleural  effusion was tapped . She had Pleurx catheter placed but then removed as pleural fluid has reduced to few cc per week.   Her PET scan done at [facility] showed disease limited to thorax only. She has started on chemotherapy with carboplatin and Taxol with avastin. Has had 4 doses. Her PET scan showed nearly complete response. So we started her on Alimta and Avastin. She started that in February 2015.    She had CT in September 2015 which showed some worsening of subcrinal LN. Then had pet scan. That shows progression. Started back on Carboplatin and Taxol. We did give her Avastin but then we had to hold it due to protienuria. CT scan after 2 cycles showed improvement. PET scan after 4 showed partial response. She was tested for PD-L1 and was positive for both opdivo and Pembrolizumab. So we started on Pembrolizumab in late January 2016.  However PET scan after 3 cycles showed progression. So in April we started on Opdivo.    Was in hospital in the beginning of September 2016 with GI bleed and was found to have gastric ulcer.  She was started on PPI. Coumadin stopped.  In October 2016 her PET scan  showed progression.  Opdivo has been stopped.  Her PET scan also showed some fullness on her kidney.  Her creatinine had gone up.  She was then admitted and started on some IV fluids and seen by nephrology.  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  High doses of steroids actually give her diabetes.  In the meantime she also lost her  was battling myelodysplastic syndrome.  She had a follow-up endoscopy for the gastric ulcer and that came back positive for adenocarcinoma consistent with lung primary.  She has been started on salvage chemotherapy with single agent Abraxane.  She had 1 cycle in November.  She is here for her next cycle.    She is managing her diabetes well.  Dr. Mayo started her on insulin.  Her blood  sugars are getting better.    Tolerated Abraxane well. The scan after 3 cycles showed significant improvement in the mediastinal as well as abdominal lymphadenopathy.   She did have an abnormal stress test for which she ended up being in the hospital.  A reversible ischemic defect was noted.  She has been put on nitrates.  She is otherwise doing well.  She has not had any EGD since starting her chemotherapy.  She had PET scan in March which actually showed complete response.    She continued Abraxane however her neuropathy seems to have gotten worse.  We stopped treatment in April 2017.     PET scan in May 2017 showed progression. I recommended trying Tecentriq which she started end of May 2017.   PET scan in August 2017 showed partial response.  However in late September 2017 she started to have elevation of her liver function test which required me to stop her treatment.  In spite of high dose of prednisone her liver function test continue to get worse.  She had PET scan done November 2017 which showed that she had developed new liver metastases.      Due to the fact that her tumor molecular profiling on strata testing showed that she had BRAF   mutation I prescribed dabrafenib and Trimetinib.  She started those medications sometime in the first of the second week of December 2017. She was diagnosed to have a DVT in her left calf on December 7, 2017.  She has been started on anticoagulation.  She eventually was unable to handle those medications and stopped them I believe 12 December 2017.  Her PET scan in January 2018 actually showed that she had significant improvement in her disease which was surprising.  In my opinion it most likely represented delayed response to TECCENTRIQ or some spontaneous regression.  My impression at that time was that it is unlikely that she responded to few pills of Trimetinib and dabrafenib.    Then in the beginning of April her CT scan showed progression mostly in the lymph  nodes of the mediastinum and upper abdomen.  We discussed treatment options and interventions.  We discussed going to TECCENTRIQ or resuming dabrafenib and Trimetinib.  She eventually started with the oral pills.  She took them for about a week or so the last dose was sometime on 15 April 2018.  She was admitted at [facility] with fever which were felt to be secondary to these medications.    In May 2018 she was recommended to go back on treatment with dabrafenib and trimatinib but only one dose of each once a week. She is able to take it.  Then we decreased the time between the doses to every 6 day in middle of June 2018.  Then in August 2018 she increased it to half the dose of Dabarafenib (75mg two times a day ) and full dose of Trametinib (2mg daily).  She has been handling it well. It does make her mind slow she says.    She continues on Coumadin.    Comes in today for scheduled follow-up after CT scan.  She does feel that taking this medication does make her memory somewhat foggy.  She does feel weak and sometimes unsteady on her feet.  She has not followed however.  He continues to be on Coumadin.    Past Medical History     Past Medical History:   Diagnosis Date     A-fib (H)      Anemia      Cancer of lung (H)      Carotid stenosis      Chronic kidney disease      CKD (chronic kidney disease)      Coronary artery disease      Diabetes mellitus (H) 11/1/2016     Diabetes mellitus, type II (H)      Exacerbation of asthma 4/4/2007     High cholesterol      HTN (hypertension)      Hyperlipidemia      Hypertension      Peripheral neuropathy      Pleural effusion      Primary lung adenocarcinoma (H)     Stage IV NSCLC     Upper GI bleed      Review of Systems   Constitutional  Constitutional (WDL): All constitutional elements are within defined limitsDenies any complaints.  Neurosensory  Neurosensory (WDL): Exceptions to WDL  Peripheral Motor Neuropathy: Asymptomatic, clinical or diagnostic observations  only, intervention not indicated  Ataxia: Asymptomatic, clinical or diagnostic observations only, intervention not indicated  Peripheral Sensory Neuropathy: Moderate symptoms, limiting instrumental ADL(fingertips, toes. occ lips)  Cardiovascular  Cardiovascular (WDL): Exceptions to WDL  Edema: Yes  Edema Limbs: 5 - 10% inter-limb discrepancy in volume or circumference at point of greatest visible difference, swelling or obscuration of anatomic architecture on close inspection(left ankle)  Pulmonary     Gastrointestinal  Gastrointestinal (WDL): Exceptions to WDL  Dry Mouth: Symptomatic (e.g., dry or thick saliva) without significant dietary alteration, unstimulated saliva flow >0.2 ml/min  Genitourinary  Genitourinary (WDL): All genitourinary elements are within defined limits  Integumentary  Integumentary (WDL): All integumentary elements are within defined limits  Patient Coping  Patient Coping: Accepting;Anxiety  ECOG Performance   1  Pain Status  Currently in Pain: No/denies  Accompanied by  Accompanied by: Family Member(daughter)      Vital Signs     Vitals:    11/14/18 1113   BP: (!) 209/84   Pulse:    Temp:    SpO2:        Physical Exam     GENERAL: no acute distress. Cooperative in conversation.   HEENT: Starting to have alopecia. pupils are equal, round and reactive. Oral mucosa is clean and intact. No ulcerations or mucositis noted. No bleeding noted.  RESP:Chest symmetric lungs are clear bilaterally per auscultation. Regular respiratory rate. No wheezes or rhonchi.    CV: Normal S1 S2 Regular, rate and rhythm.   ABD: Nondistended, soft, slight epigastric tenderness. Positive bowel sounds. No organomegaly.   EXTREMITIES: No lower extremity edema.  Noticing slight increased edema around the lower part of her ankles.  NEURO: non focal. Alert and oriented x3.  No obvious neuro deficit.  PSYCH: within normal limits. No depression or anxiety.  SKIN: warm dry intact .      Lab Results     Results for orders  placed or performed in visit on 11/14/18   Comprehensive Metabolic Panel   Result Value Ref Range    Sodium 143 136 - 145 mmol/L    Potassium 4.3 3.5 - 5.0 mmol/L    Chloride 107 98 - 107 mmol/L    CO2 29 22 - 31 mmol/L    Anion Gap, Calculation 7 5 - 18 mmol/L    Glucose 99 70 - 125 mg/dL    BUN 36 (H) 8 - 28 mg/dL    Creatinine 1.43 (H) 0.60 - 1.10 mg/dL    GFR MDRD Af Amer 42 (L) >60 mL/min/1.73m2    GFR MDRD Non Af Amer 35 (L) >60 mL/min/1.73m2    Bilirubin, Total 0.3 0.0 - 1.0 mg/dL    Calcium 9.7 8.5 - 10.5 mg/dL    Protein, Total 6.4 6.0 - 8.0 g/dL    Albumin 3.3 (L) 3.5 - 5.0 g/dL    Alkaline Phosphatase 205 (H) 45 - 120 U/L    AST 32 0 - 40 U/L    ALT 23 0 - 45 U/L   HM1 (CBC with Diff)   Result Value Ref Range    WBC 8.4 4.0 - 11.0 thou/uL    RBC 3.84 3.80 - 5.40 mill/uL    Hemoglobin 10.9 (L) 12.0 - 16.0 g/dL    Hematocrit 35.0 35.0 - 47.0 %    MCV 91 80 - 100 fL    MCH 28.4 27.0 - 34.0 pg    MCHC 31.1 (L) 32.0 - 36.0 g/dL    RDW 17.2 (H) 11.0 - 14.5 %    Platelets 292 140 - 440 thou/uL    MPV 10.5 8.5 - 12.5 fL    Neutrophils % 71 (H) 50 - 70 %    Lymphocytes % 16 (L) 20 - 40 %    Monocytes % 7 2 - 10 %    Eosinophils % 5 0 - 6 %    Basophils % 1 0 - 2 %    Neutrophils Absolute 5.9 2.0 - 7.7 thou/uL    Lymphocytes Absolute 1.4 0.8 - 4.4 thou/uL    Monocytes Absolute 0.6 0.0 - 0.9 thou/uL    Eosinophils Absolute 0.4 0.0 - 0.4 thou/uL    Basophils Absolute 0.0 0.0 - 0.2 thou/uL     *Note: Due to a large number of results and/or encounters for the requested time period, some results have not been displayed. A complete set of results can be found in Results Review.         Distress assessment and ECOG status      ECOG Performance:    ECOG Performance Status: 1    Distress Assessment  Distress Assessment Score: 8       Imaging Results       Ct Chest Without Contrast    Result Date: 11/12/2018  CT CHEST WO CONTRAST 11/12/2018 10:38 AM INDICATION: Neoplasm: chest, metastatic, rx monitor or f/u TECHNIQUE:  Routine chest. Dose reduction techniques were used. IV CONTRAST: None COMPARISON: CT exams 8/13/2018, 5/1/2018 and 11/8/2016, PET/CT 1/29/2018 FINDINGS: LUNGS AND PLEURA: The central airways are clear. There is a stable 6 mm right lower lobe subpleural nodular opacity image 85 series 3. Additional smaller right lung nodules including a 4 mm nodule image 68 series 3. A 3 mm right middle lobe nodule image 61 series 3 is also stable. There is a stable 3 mm left lower lobe superior nodule image 50. Additional small left lung nodules are also stable. MEDIASTINUM: There has been interval decrease in size of the dominant prevascular node now measuring 0.7 x 0.8 cm image 21 series 4 versus 1.1 x 1.3 cm previously. A dominant precarinal node measures 0.5 x 0.7 cm versus 0.8 x 0.9 cm previously. No discrete enlarged hilar nodes are identified on this noncontrast exam. Normal heart size and no pericardial effusion. Moderate LAD coronary artery calcifications. Extensive atherosclerotic changes in the thoracic aorta. LIMITED UPPER ABDOMEN: The central hepatic metastases seen on comparison PET CT imaging are not well visualized on this noncontrast study. There is a stable subtle hypodensity within the central liver measuring 1.0 cm image 23 series 4. Stable lipid rich  left adrenal adenoma. Right renal atrophy. MUSCULOSKELETAL: Negative.     CONCLUSION: 1.  Interval improved mediastinal adenopathy. 2.  Stable small bilateral solid pulmonary nodules. 3.  Stable upper abdominal findings as described above.          Signed by: Jemal Evans MD

## 2021-06-22 NOTE — PROGRESS NOTES
I left a detailed message letting her know that her INR is stable at 2.57.  She should stay on the same 4mg daily coumadin and recheck in 1 month(1/25/19).  I asked her to call back in to schedule this appt.  I also told her I got the message from the  about the handicap permit. I let her know we will work on this and call her when it is ready.    Francisca Ortiz RN

## 2021-06-22 NOTE — PROGRESS NOTES
[facility] Clinic Note    Blanca Oreilly   85 y.o. female    Date of Visit: 12/14/2018  Chief Complaint   Patient presents with     Hypertension       ASSESSMENT/PLAN  1. Essential hypertension with goal blood pressure less than 140/90     2. Tachycardia  Thyroid Stimulating Hormone (TSH)    Basic Metabolic Panel    HM2(CBC w/o Differential)    CANCELED: ECG 12 lead with MUSE   3. Paroxysmal atrial fibrillation with rapid ventricular response (H)  Electrocardiogram Perform and Read     ---------------------------------------------    1. BP above goal-- add back amlodipine (stopped this unwittingly) and increase metoprolol as below    2-3. Episodes of HR in 120s.  She may have PAF.  Check above tests to evaluate for other causes of sinus tach (unclear what rhythm she was in because at time of EKG was in upper 90s (sinus).  Increase metoprolol succinate from 75 up to 100 mg daily.  She is already on warfarin, low suspicion of PE.  She is completely asymptomatic.  I think she is ok to travel next week.     Return in about 1 month (around 1/14/2019) for Recheck, Blood pressure.      SUBJECTIVE  Blanca Oreilly is an 85-year-old woman with history of lung cancer and paroxysmal atrial fibrillation, diabetes, who presents for rapid heart rate, elevated blood pressure.  She is here with her daughter Maida.    Yesterday, she was busy cooking, making lefse, and thinks that all his commotion drove her blood pressure up.  Systolic pressure was up to 198, other checks have been in the 150s.  This is higher than usual.  She noticed today that she has not been on her amlodipine lately, and states that she felt foolish because of this.  She also mentioned that her heart rate has been in the 1 teens and 120s, really is in the 70s.    She is completely asymptomatic, no chest pain or shortness of breath, no diarrhea, no change in appetite, no dysuria or frequency.  She was recently treated for urinary tract infection on  November 26, is already anticoagulated with warfarin because of a history of atrial fibrillation.  She has not had any bleeding episodes.  She is on metoprolol succinate 75 mg daily.   It sounds like she has been on this off and on.    She takes trametinib, dabrafenib for metastatic lung cancer.  She also is on insulin for management of diabetes.    It has been over one year since thyroid was checked, not on any thyroid supplementation.  She has a history of mild anemia.     Notably, there is a history of DVT while on anticoagulation.    ROS A comprehensive review of systems was performed and was otherwise negative    Medications, allergies, and problem list were reviewed and updated    Patient Active Problem List   Diagnosis     Hyperlipidemia     Chronic Renal Insufficiency     Essential hypertension with goal blood pressure less than 140/90     Pleural effusion, right     Malignant neoplasm of upper lobe bronchus, right, PD-L1 and [id number] mutation positive.     Occlusion and stenosis of carotid artery without mention of cerebral infarction     Ischemic chest pain     Urinary frequency     Proteinuria     Antineoplastic chemotherapy induced anemia     Chemotherapy-induced peripheral neuropathy (H)     Thyroid nodule     Upper GI bleed     Paroxysmal atrial fibrillation with rapid ventricular response (H)     Elevated fasting blood sugar     Atopic rhinitis     Exacerbation of asthma     Atherosclerosis of coronary artery     Disorder of bone and cartilage     Abnormal C-reactive protein     Type 2 diabetes mellitus without complication, with long-term current use of insulin (H)     SVT (supraventricular tachycardia) (H)     Abnormal nuclear stress test     Chest pain, cardiac     Encounter for chemotherapy management     Elevated LFTs     Drug-induced hepatitis     Neutropenic fever (H)     Hyponatremia     History of lung cancer     Weakness     Essential hypertension     Past Medical History:   Diagnosis Date      A-fib (H)      Anemia      Cancer of lung (H)      Carotid stenosis      Chronic kidney disease      CKD (chronic kidney disease)      Coronary artery disease      Diabetes mellitus (H) 2016     Diabetes mellitus, type II (H)      Exacerbation of asthma 2007     High cholesterol      HTN (hypertension)      Hyperlipidemia      Hypertension      Peripheral neuropathy      Pleural effusion      Primary lung adenocarcinoma (H)     Stage IV NSCLC     Upper GI bleed      Past Surgical History:   Procedure Laterality Date     ADENOIDECTOMY       CATARACT EXTRACTION W/ INTRAOCULAR LENS  IMPLANT, BILATERAL       ESOPHAGOGASTRODUODENOSCOPY N/A 2016    Procedure: ESOPHAGOGASTRODUODENOSCOPY with BICAP cautery use and Biopsies;  Surgeon: Ezio Shane MD;  Location: [facility];  Service:      pluerex catheter placement       RI EXCISE BREAST CYST      Description: Breast Surgery Lumpectomy;  Recorded: 2013;     TONSILLECTOMY       Social History     Socioeconomic History     Marital status:      Spouse name: Not on file     Number of children: Not on file     Years of education: Not on file     Highest education level: Not on file   Social Needs     Financial resource strain: Not on file     Food insecurity - worry: Not on file     Food insecurity - inability: Not on file     Transportation needs - medical: Not on file     Transportation needs - non-medical: Not on file   Occupational History     Not on file   Tobacco Use     Smoking status: Former Smoker     Packs/day: 0.10     Years: 30.00     Pack years: 3.00     Last attempt to quit: 10/30/1980     Years since quittin.1     Smokeless tobacco: Never Used     Tobacco comment: Not a steady smoker   Substance and Sexual Activity     Alcohol use: No     Drug use: No     Sexual activity: No   Other Topics Concern     Not on file   Social History Narrative     - lives with her      Family History   Problem Relation Age of  "Onset     Kidney disease Mother      Heart disease Father      Heart disease Sister      Kidney disease Sister      Stroke Sister      No Medical Problems Daughter      No Medical Problems Daughter      No Medical Problems Son      No Medical Problems Son        Current Outpatient Medications   Medication Sig Dispense Refill     amLODIPine (NORVASC) 5 MG tablet Take 1 tablet (5 mg total) by mouth daily. 90 tablet 3     atorvastatin (LIPITOR) 40 MG tablet TAKE 1 TABLET BY MOUTH DAILY. 90 tablet 2     BASAGLAR KWIKPEN U-100 INSULIN 100 unit/mL (3 mL) pen INJECT 6 UNITS UNDER THE SKIN AT BEDTIME. 15 adj dose pen 0     BD ULTRA-FINE JOHN PEN NEEDLES 32 gauge x 5/32\" Ndle USE 4 TIMES DAILY AS DIRECTED WITH INSULIN 100 each 5     blood glucose test (CONTOUR NEXT STRIPS) strips Use 1 each As Directed 5 (five) times a day. Dispense brand per patient's insurance at pharmacy discretion. 450 strip 7     dabrafenib 75 mg cap Take 150 mg by mouth 2 (two) times a day. (Patient taking differently: Take 75 mg by mouth 2 (two) times a day .      ) 120 capsule 3     INSULIN ASPART (NOVOLOG FLEXPEN SUBQ) Inject 0-8 Units under the skin 3 (three) times a day before meals. Dose per sliding scale  BG less than 150 = 0 units  151-200 = 4 units  201- 250 = 6 units  > 251 = 8 units       insulin aspart U-100 (NOVOLOG FLEXPEN U-100 INSULIN) 100 unit/mL injection pen Inject 4 Units under the skin 3 (three) times a day before meals. 15 mL 1     insulin glargine (BASAGLAR KWIKPEN U-100 INSULIN) 100 unit/mL (3 mL) pen Inject 6 Units under the skin at bedtime. 15 mL 1     isosorbide mononitrate (IMDUR) 30 MG 24 hr tablet TAKE 1 TABLET (30 MG TOTAL) BY MOUTH DAILY. 90 tablet 1     lisinopril (PRINIVIL,ZESTRIL) 10 MG tablet Take 1 tablet (10 mg total) by mouth daily. 90 tablet 3     loperamide (IMODIUM) 2 mg capsule Take 2 mg by mouth daily.        LORazepam (ATIVAN) 0.5 MG tablet Take 1 tablet (0.5 mg total) by mouth every 8 (eight) hours as " "needed for anxiety. 30 tablet 0     magnesium 250 mg Tab Take 500 mg by mouth daily.        metoprolol succinate (TOPROL-XL) 50 MG 24 hr tablet Take 2 tablets (100 mg total) by mouth at bedtime. 180 tablet 3     multivitamin (ONE A DAY) per tablet Take 1 tablet by mouth daily.       nitroglycerin (NITROSTAT) 0.4 MG SL tablet Place 1 tablet (0.4 mg total) under the tongue every 5 (five) minutes as needed for chest pain. 10 tablet 1     ranitidine (ZANTAC) 150 MG tablet TAKE 1 TAB BY MOUTH TWICE DAILY. 180 tablet 3     trametinib (MEKINIST) 2 mg Tab Take 2 mg by mouth daily. Taking one day weekly on Mondays 30 tablet 3     warfarin (COUMADIN) 4 MG tablet Take 4-4.5 mg by mouth See Admin Instructions. 4 mg daily       No current facility-administered medications for this visit.        Allergies   Allergen Reactions     Ciprofloxacin Other (See Comments)     \"Ankle pain\"     Iodinated Contrast- Oral And Iv Dye Hives     Hives & CKD     Penicillins Swelling     Lip swelling.      Protonix [Pantoprazole] Other (See Comments)     Renal failure       EXAM  Vitals:    12/14/18 1043 12/14/18 1046   BP: 154/64 138/60   Patient Site: Left Arm Left Arm   Patient Position: Sitting Sitting   Cuff Size: Adult Regular Adult Regular   Pulse: (!) 120    Resp: 14    SpO2: 94%    Weight: 115 lb (52.2 kg)          General: alert, no distress  HEENT: sclerae anicteric, moist oral mucosa  Heart: Regular rate and rhythm, no murmurs.  No pretibial edema.  Warm extremities  Lungs: Clear to auscultation bilat  Gastrointestinal: abdomen is  non-distended.    Skin: warm/dry, no rashes, normal skin turgor  Neuro: no gross abnormalities  Psych: pleasant affect      RESULTS REVIEWED:     ANALYSIS AND SUMMARY OF OLD RECORDS, NOTES AND CONSULTS (2): rev last note by Marco Garcia MD     RECORDS REQUESTED (1): None.     OTHER HISTORY SUMMARIZED (from nursing staff, family, friends) (2):     RADIOLOGY TESTS SUMMARIZED or REQUESTED (XRAY/CT/MRI/DXA) " (1):   No results found.    MEDICINE TESTS SUMMARIZED or REQUESTED (EKG/ECHO/COLONOSCOPY/EGD) (1): ekg requested    INDEPENDENT REVIEW OF EKG OR X-RAY (2): EKG: NSR.    Recent Results (from the past 240 hour(s))   Electrocardiogram Perform and Read   Result Value Ref Range    SYSTOLIC BLOOD PRESSURE  mmHg    DIASTOLIC BLOOD PRESSURE  mmHg    VENTRICULAR RATE 99 BPM    ATRIAL RATE 99 BPM    P-R INTERVAL 208 ms    QRS DURATION 78 ms    Q-T INTERVAL 352 ms    QTC CALCULATION (BEZET) 451 ms    P Axis 66 degrees    R AXIS -16 degrees    T AXIS 49 degrees    MUSE DIAGNOSIS       Normal sinus rhythm  Normal ECG  When compared with ECG of 20-DEC-2017 11:26,  No significant change was found  Confirmed by LAKISHA LANIER MD LOC: ([id number]) on 12/14/2018 6:09:16 PM     Thyroid Stimulating Hormone (TSH)   Result Value Ref Range    TSH 1.91 0.30 - 5.00 uIU/mL   Basic Metabolic Panel   Result Value Ref Range    Sodium 140 136 - 145 mmol/L    Potassium 4.0 3.5 - 5.0 mmol/L    Chloride 103 98 - 107 mmol/L    CO2 29 22 - 31 mmol/L    Anion Gap, Calculation 8 5 - 18 mmol/L    Glucose 169 (H) 70 - 125 mg/dL    Calcium 10.5 8.5 - 10.5 mg/dL    BUN 37 (H) 8 - 28 mg/dL    Creatinine 1.27 (H) 0.60 - 1.10 mg/dL    GFR MDRD Af Amer 48 (L) >60 mL/min/1.73m2    GFR MDRD Non Af Amer 40 (L) >60 mL/min/1.73m2   HM2(CBC w/o Differential)   Result Value Ref Range    WBC 6.6 4.0 - 11.0 thou/uL    RBC 4.21 3.80 - 5.40 mill/uL    Hemoglobin 11.4 (L) 12.0 - 16.0 g/dL    Hematocrit 35.1 35.0 - 47.0 %    MCV 83 80 - 100 fL    MCH 27.2 27.0 - 34.0 pg    MCHC 32.5 32.0 - 36.0 g/dL    RDW 14.0 11.0 - 14.5 %    Platelets 230 140 - 440 thou/uL    MPV 8.2 7.0 - 10.0 fL        Data points  6     Semaj Cohn DO  Internal Medicine  [facility]

## 2021-06-23 NOTE — TELEPHONE ENCOUNTER
Deepika calls in to state that they were in the ER and are back home now.  They did a brain MRI which was normal with only age related findings, nothing worrisome for cancer.  They did her INR which Dr Evans is managing.  Per Deepika, they want to hold off on any chemo pills until they see him on 2/14/19.  This is fine with him.  They were told to get a neurology consult asap.  Referral was placed by our office for this.      Francisca Ortiz RN

## 2021-06-23 NOTE — TELEPHONE ENCOUNTER
Who is calling:  Jaspreet Bey  Reason for Call:  Calling regarding the patient. The patient was prescribed Donepezil 5mg-take 1 tablet daily for a few months than increase to 10mg daily by Dr Alegre at Neurology today 2/11/2019.   The patient has concerns on if she should begin this medication or not and would like to have Dr Das's feedback on this medication? The patient will also be getting her oncologists opinion as well. The neurologist recommended this medication due to increased confusion.   Patient wanted to schedule an appointment to discuss but there are no openings this week and they are on the wait list. She is leaving Monday 2/18/19 for 3 weeks and would like to connect with her PCP only to address the recent issues     Please have Dr Garcia advise on this medication and connect with patient/daughter if possible    Date of last appointment with primary care: 1/22/19  Has the patient been recently seen:  No  Okay to leave a detailed message: Yes-Maida [phone]

## 2021-06-23 NOTE — PROGRESS NOTES
Information was given to daughter Deepika who is setting up her medications.    Francisca Ortiz RN

## 2021-06-23 NOTE — PROGRESS NOTES
[facility] Clinic Note    Blanca Oreilly   85 y.o. female    Date of Visit: 1/22/2019  Chief Complaint   Patient presents with     Medication Management     Altered Mental Status     forgot how to take her BS and give insulin       ASSESSMENT/PLAN  1. Apraxia  Comprehensive Metabolic Panel    HM2(CBC w/o Differential)    Urinalysis-UC if Indicated    CANCELED: Urinalysis-UC if Indicated   2. Malignant neoplasm of upper lobe bronchus, right, PD-L1 and [id number] mutation positive.     3. Essential hypertension with goal blood pressure less than 140/90     4. Hypercalcemia  Parathyroid Hormone Intact     ---------------------------------------------    1.  She is having symptoms best described as apraxia, unable to do some specific tasks that she is otherwise familiar with such as playing cards or doing insulin.  Exam is fairly nonfocal aside from some subtle abnormalities in fine motor coordination, left heel to shin which may be more cerebellar.  Her daughter seems to think that the current cancer medications are responsible for some of this confusion.  That could be the case, though I would like to do the above lab work, screen for UTI first.  I would also like to run this by Dr. Evans for his thoughts.  I suggested considering an MRI of the brain, though she seemed opposed to this, not wanting to know if she had metastatic disease in the brain.  I think MRI should still be considered if there is not a clear answer for the test abnormalities.    2.  See above    3.  Blood pressure above goal, was not taking lisinopril.  She should get back on her medications.  This was secondary to #1 today, however    4.  Hypercalcemia noted on lab workup, corrected calcium is about 11.7, which is fairly mild, but may contribute to some confusion.  Hypercalcemia of malignancy on ddx as well as some mild volume depletion.  Add on iPTH, though I don't think I can add on Vitamin D testing.      Return in about 2 weeks (around  2/5/2019) for Recheck.      SUBJECTIVE    Blanca Oreilly is a patient of Dr. Garcia's and under the care of Dr. Evans for metastatic lung cancer, who presents for confusion episodes with her daughter Maida in attendance.      A few days ago, she went to take the insulin and had a difficult time checking the glucose level.  This has happened before and improved after holding trametinib and dabrafenib for a few days.  Maida thinks the medication is the cause.      Last weekend, Maida took her mother over to her house to watch her and make sure she was ok.  Two days ago she had no idea how to check the blood sugar.  She was also not reading the pens correctly, which they both agree may be more of a 's problem.  Ms. Oreilly admits that she simply could not recall how to check her blood sugar.  A similar episode happened while playing [place]vel[employer].  This is again very familiar to her, and there was a point in time where she could not figure out how to play.  When this was explained to her, she made some excuse for why she did not know.    Maida tells me that a couple of times, she was hospitalized for fever or dehydration, this was attributed to the medication dose, and has tolerated lower doses of the cancer treatments.  It appears she has responded to these treatments, so he does not want to discontinue the medications.    Sometimes she will not take her blood pressure medications if the blood pressure is good.  She has not been taking lisinopril for a week.  She also admits that her blood pressure is usually high at doctor's visits.  It is high today.    ROS:   Per HPI, all other systems negative     Medications, allergies, and problem list were reviewed and updated    Patient Active Problem List   Diagnosis     Hyperlipidemia     Hypercalcemia     Chronic Renal Insufficiency     Essential hypertension with goal blood pressure less than 140/90     Pleural effusion, right     Malignant neoplasm of upper  "lobe bronchus, right, PD-L1 and [id number] mutation positive.     Occlusion and stenosis of carotid artery without mention of cerebral infarction     Ischemic chest pain     Urinary frequency     Proteinuria     Antineoplastic chemotherapy induced anemia     Chemotherapy-induced peripheral neuropathy (H)     Thyroid nodule     Paroxysmal atrial fibrillation with rapid ventricular response (H)     Elevated fasting blood sugar     Atopic rhinitis     Exacerbation of asthma     Atherosclerosis of coronary artery     Disorder of bone and cartilage     Abnormal C-reactive protein     Type 2 diabetes mellitus without complication, with long-term current use of insulin (H)     SVT (supraventricular tachycardia) (H)     Chest pain, cardiac     Elevated LFTs     Drug-induced hepatitis     Neutropenic fever (H)     History of lung cancer     Past Medical History:   Diagnosis Date     A-fib (H)      Anemia      Cancer of lung (H)      Carotid stenosis      Chronic kidney disease      CKD (chronic kidney disease)      Coronary artery disease      Diabetes mellitus (H) 11/1/2016     Diabetes mellitus, type II (H)      Exacerbation of asthma 4/4/2007     High cholesterol      HTN (hypertension)      Hyperlipidemia      Hypertension      Peripheral neuropathy      Pleural effusion      Primary lung adenocarcinoma (H)     Stage IV NSCLC     Upper GI bleed      Current Outpatient Medications   Medication Sig Dispense Refill     amLODIPine (NORVASC) 5 MG tablet Take 1 tablet (5 mg total) by mouth daily. 90 tablet 3     atorvastatin (LIPITOR) 40 MG tablet TAKE 1 TABLET BY MOUTH DAILY. 90 tablet 2     BASAGLAR KWIKPEN U-100 INSULIN 100 unit/mL (3 mL) pen INJECT 6 UNITS UNDER THE SKIN AT BEDTIME. 15 adj dose pen 0     BD ULTRA-FINE JOHN PEN NEEDLES 32 gauge x 5/32\" Ndle USE 4 TIMES DAILY AS DIRECTED WITH INSULIN 100 each 5     blood glucose test (CONTOUR NEXT TEST STRIPS) strips Use 1 each As Directed 5 (five) times a day. Dispense brand " "per patient's insurance at pharmacy discretion. 450 strip 7     dabrafenib 75 mg cap Take 150 mg by mouth 2 (two) times a day. (Patient taking differently: Take 75 mg by mouth 2 (two) times a day .      ) 120 capsule 3     INSULIN ASPART (NOVOLOG FLEXPEN SUBQ) Inject 0-8 Units under the skin 3 (three) times a day before meals. Dose per sliding scale  BG less than 150 = 0 units  151-200 = 4 units  201- 250 = 6 units  > 251 = 8 units       insulin aspart U-100 (NOVOLOG FLEXPEN U-100 INSULIN) 100 unit/mL injection pen Inject 4 Units under the skin 3 (three) times a day before meals. 15 mL 1     isosorbide mononitrate (IMDUR) 30 MG 24 hr tablet TAKE 1 TABLET (30 MG TOTAL) BY MOUTH DAILY. 90 tablet 1     lisinopril (PRINIVIL,ZESTRIL) 10 MG tablet Take 1 tablet (10 mg total) by mouth daily. 90 tablet 3     loperamide (IMODIUM) 2 mg capsule Take 2 mg by mouth daily.        magnesium 250 mg Tab Take 500 mg by mouth daily.        metoprolol succinate (TOPROL-XL) 50 MG 24 hr tablet Take 2 tablets (100 mg total) by mouth at bedtime. 180 tablet 3     multivitamin (ONE A DAY) per tablet Take 1 tablet by mouth daily.       nitroglycerin (NITROSTAT) 0.4 MG SL tablet Place 1 tablet (0.4 mg total) under the tongue every 5 (five) minutes as needed for chest pain. 10 tablet 1     ranitidine (ZANTAC) 150 MG tablet TAKE 1 TAB BY MOUTH TWICE DAILY. 180 tablet 3     trametinib (MEKINIST) 2 mg Tab Take 2 mg by mouth daily. Taking one day weekly on Mondays (Patient taking differently: Take 2 mg by mouth daily.       ) 30 tablet 3     warfarin (COUMADIN/JANTOVEN) 4 MG tablet 4 mg daily, dose adjusted per INR 90 tablet 3     No current facility-administered medications for this visit.      Allergies   Allergen Reactions     Ciprofloxacin Other (See Comments)     \"Ankle pain\"     Iodinated Contrast- Oral And Iv Dye Hives     Hives & CKD     Penicillins Swelling     Lip swelling.      Protonix [Pantoprazole] Other (See Comments)     Renal " "failure       EXAM  Vitals:    01/22/19 1538   BP: 144/66   Patient Site: Left Arm   Patient Position: Sitting   Cuff Size: Adult Regular   Pulse: 66   SpO2: 99%   Weight: 114 lb 14.4 oz (52.1 kg)   Height: 4' 11\" (1.499 m)         General: alert, no distress  HEENT: sclerae anicteric, moist oral mucosa  Heart: Regular rate and rhythm, no murmurs.  No pretibial edema.  Warm extremities.  Compression stockings in place  Lungs: Clear to auscultation bilat  Gastrointestinal: abdomen is non-distended.    Skin: warm/dry, no rashes  Neuro: Cranial nerves II through XII intact, no pronator drift, finger-nose test normal, left hand a bit clumsy with rapid finger tapping, has difficulty performing heel to shin with the left leg.  She made a few comments which were ever so slightly off or out of context  Psychiatric: Pleasant affect       Results reviewed: Reviewed hematology/oncology note from 11/14/18, has lung cancer with BRAF mutation.  Noted that she had nephritis at one point in time.    Labs ordered today, CT 11/12/18 shows improvement in mediastinal adenopathy    Data points: 4    Semaj Cohn, DO  Internal Medicine  [facility]    "

## 2021-06-23 NOTE — TELEPHONE ENCOUNTER
Deepika calls in today with concern about her mothers mental status.  A couple of days ago she had some mild confusion but today she woke up and knows her daughters name but has no recollection of anything about Deepika.  Deepika also reports that yesterday was the first day that her mother did not want to take her cancer pills. She did not have any yesterday or today.      Per Dr Evans, Deepika should bring her to the ER.  They will likely do a head MRI to see what is going on.  She verbalized understanding.    Francisca Ortiz RN

## 2021-06-23 NOTE — TELEPHONE ENCOUNTER
Medication Question or Clarification  Who is calling: Pharmacy: [facility]  What medication are you calling about?: blood glucose test (CONTOUR NEXT STRIPS) strips  What dose do you take?: N/A  How often are you taking the  medication?: Use 1 each As Directed 5 (five) times a day. Dispense brand per patient's insurance at pharmacy discretion  Who prescribed the medication?: Mariza Bright MD  What is your question/concern?: Per new guid lines medicare part B patients based on medicare standard guidelines. Please send a new prescription to pharmacy   Pharmacy: [facility] [id number] [place] [address]  Okay to leave a detailed message?: Yes  Site CMT - Please call the pharmacy to obtain any additional needed information.

## 2021-06-24 NOTE — TELEPHONE ENCOUNTER
Spoke with Amanda and Blanca. Relayed Francisca's message (pls see test result  note of today)  asked Amanda to call Francisca on Monday to find out when to check inr next

## 2021-06-24 NOTE — TELEPHONE ENCOUNTER
Lab results received from [facility] of INR of 3.6.  Per Kay Kim CNP, she should hold for 2 days and then restart with 2mg on Tue, Thur and 4 mg all other days.  Since she is out of state with another daughter, I left a message on daughter Maida's line as the only numbers I have are hers and then Blanca' home phone.  I asked her to call back with alternate number or to see if I could give directions to her to pass along.  Will await a return call.    Francisca Ortiz RN

## 2021-06-24 NOTE — TELEPHONE ENCOUNTER
"Refill Approved    Rx renewed per Medication Renewal Policy. Medication was last renewed on 1/14/18.    Ov: 1/22/19    Gina Pope, [facility] Triage/Med Refill 2/14/2019     Requested Prescriptions   Pending Prescriptions Disp Refills     BD ULTRA-FINE JOHN PEN NEEDLE 32 gauge x 5/32\" Ndle [Pharmacy Med Name: BD UF JOHN PEN NEEDLE [id number]]  2     Sig: USE 4 TIMES DAILY AS DIRECTED WITH INSULIN    Diabetic Supplies Refill Protocol Passed - 2/14/2019 12:40 PM       Passed - Visit with PCP or prescribing provider visit in last 6 months    Last office visit with prescriber/PCP: 10/26/2018 Marco Garcia MD OR same dept: 1/22/2019 Semaj Cohn DO OR same specialty: 1/22/2019 Semaj Cohn DO  Last physical: Visit date not found Last MTM visit: Visit date not found   Next visit within 3 mo: Visit date not found  Next physical within 3 mo: Visit date not found  Prescriber OR PCP: Marco Garcia MD  Last diagnosis associated with med order: There are no diagnoses linked to this encounter.  If protocol passes may refill for 12 months if within 3 months of last provider visit (or a total of 15 months).            Passed - A1C in last 6 months    Hemoglobin A1c   Date Value Ref Range Status   10/26/2018 8.7 (H) 3.5 - 6.0 % Final                           "

## 2021-06-24 NOTE — TELEPHONE ENCOUNTER
Who is calling:  Amanda, patient's daughter.   Reason for Call:  Amanda was calling as patient is visiting her in [place] and had her INR done while down there.  See related telephone encounter 3/1/19.  They are requesting call back from [facility] with dosing. Daughter Maida is out of country currently.  Patient's mobile #[phone]  Daughter Amanda mobile #[phone]  Date of last appointment with primary care: 01/22/19  Has the patient been recently seen:  Yes  Okay to leave a detailed message: No

## 2021-06-24 NOTE — PROGRESS NOTES
Patient called in after hours and a triage nurse read my note and gave appropriate instructions.  She held on Friday and Saturday and then is doing 2mg on Tues, Thurs and 4 mg all other days.  She will recheck on Monday 3/18/19.    Francisca Ortiz RN

## 2021-06-24 NOTE — PROGRESS NOTES
[facility] Hematology and Oncology Progress Note    Patient: Blanca Oreilly  MRN: [id number]  Date of Service: 2/14/2019           Reason for visit      1. Malignant neoplasm of upper lobe bronchus, right, PD-L1 and [id number] mutation positive.    NSCLC; EGFR and ALK-1 negative. PD-L1 strongly positive for both 22C3, 28-8    Assessment     1. Very pleasant 85 y.o. woman with stage IV NSCLC, adenocarcinoma. Negative for EGFR  And ALK-1.  PD-L1 she is strongly positive.  On strata testing she has BRA QM199N mutation.  November 2017 PET scan suggestive of liver metastases as well as mediastinal lymph node metastases.  She was prescribed and took few days of dabrafenib and Trimetinib in the second week of December 2017.  Stopped after few days.  Surprisingly her PET scan in January 2018 showed very good response.  CT scan in the beginning of April showed progression.  She resumed dabrafenib and Trimetinib.  Took them for a few days.  Then stopped but in April 2018 CT scan again shows that she has responded to the medication. So in May 2018 she went  back on treatment with dabrafenib and trimatinib but only one dose of each once a week.  In June 2018 frequency increased to 1 dose of each pill every 6 day.  Then in July 2018 she increase the frequency of taking one dose of each medication every fifth day.  Then she went to half the dose of Dabarafenib (75mg two times a day ) and full dose of Trametinib (2mg daily).  November 2018 scan showed stable to mild improvement in the mediastinal adenopathy.  Dabrafenib and Trimetinib stopped in January 2019 due to memory issues.  CT scan shows that she may have slight progression in one mediastinal lymph node.  2.  History of prior treatment include 4 cycles of carboplatin, taxol and avastin. Then was on Alimta and Avastin.  Later on she has had repeat treatment with 4 cycles Carboplatin and Taxol. PET scan showed good response.  Started on Pembrolizumab but after 4 cycles  there was some progression.  She then tried and progressed on Opdivo.  Then she was on Abraxane from November 2016 until April 2017. PET scan showed complete response. Stopped due to side effects. Her May 2017 PET scan showed progression.  She was on Atezulizumab from end of May beginning of October 2017. PET scan in November 2017 showed worsening of the liver metastases.  Was on dabrafenib and Trimetinib since December 2017.  3. History of gastric ulcer pathology is consistent with non-small cell lung cancer.  Clinically she seems to be doing well on that her hemoglobin is stable and she has not had any more blood in her stools.  4. Elevated creatinine.  5. History of DVT for which she is on anticoagulation.  INR is supratherapeutic.  6. Anxiety and depression.  Seems to be better.  7. History of momentary blindness which she is concerned might be due to dabrafenib and try imatinib.  8. Now having significant memory issues.  Has been seen by neurologist.  Has been prescribed Namenda I think.    Plan     1. At this time I would recommend continuing treatment with dabrafenib and trimatinib but at a reduced dose.  I recommend she  take a dose every other day.  2. RTC in 2 months with a scan.    3. Continue anticoagulation per plan.  Decrease the dose of Coumadin to 4 mg daily after holding it for couple of days.  4. Advised to stop taking her magnesium.    Clinical stage      Lung cancer, Right side    Primary site: Lung (Right)    Staging method: AJCC 7th Edition    Clinical: Stage IV (T4, N0, M1a) signed by Jemal Evans MD on 10/20/2014  2:19 PM    Summary: Stage IV (T4, N0, M1a)      History     Blanca Oreilly is a very pleasant 85 y.o. old female with a history of  non-small cell lung cancer located in the right lung measuring 5.7 cm in size presenting with some shortness of breath associated with malignant pleural effusion in the month of October 2014.  Pleural tap was positive for malignant cells  adenocarcinoma in nature.  CK 7 positive CK 20 negative TTF-1 positive.  Subsequently that she was admitted due to worsening shortness of breath and her pleural effusion was tapped . She had Pleurx catheter placed but then removed as pleural fluid has reduced to few cc per week.   Her PET scan done at [facility] showed disease limited to thorax only. She has started on chemotherapy with carboplatin and Taxol with avastin. Has had 4 doses. Her PET scan showed nearly complete response. So we started her on Alimta and Avastin. She started that in February 2015.    She had CT in September 2015 which showed some worsening of subcrinal LN. Then had pet scan. That shows progression. Started back on Carboplatin and Taxol. We did give her Avastin but then we had to hold it due to protienuria. CT scan after 2 cycles showed improvement. PET scan after 4 showed partial response. She was tested for PD-L1 and was positive for both opdivo and Pembrolizumab. So we started on Pembrolizumab in late January 2016.  However PET scan after 3 cycles showed progression. So in April we started on Opdivo.    Was in hospital in the beginning of September 2016 with GI bleed and was found to have gastric ulcer.  She was started on PPI. Coumadin stopped.  In October 2016 her PET scan  showed progression.  Opdivo has been stopped.  Her PET scan also showed some fullness on her kidney.  Her creatinine had gone up.  She was then admitted and started on some IV fluids and seen by nephrology.  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  High doses of steroids actually give her diabetes.  In the meantime she also lost her  was battling myelodysplastic syndrome.  She had a follow-up endoscopy for the gastric ulcer and that came back positive for adenocarcinoma consistent with lung primary.  She has been started on salvage chemotherapy with single agent  Abraxane.  She had 1 cycle in November.  She is here for her next cycle.    She is managing her diabetes well.  Dr. Mayo started her on insulin.  Her blood sugars are getting better.    Tolerated Abraxane well. The scan after 3 cycles showed significant improvement in the mediastinal as well as abdominal lymphadenopathy.   She did have an abnormal stress test for which she ended up being in the hospital.  A reversible ischemic defect was noted.  She has been put on nitrates.  She is otherwise doing well.  She has not had any EGD since starting her chemotherapy.  She had PET scan in March which actually showed complete response.    She continued Abraxane however her neuropathy seems to have gotten worse.  We stopped treatment in April 2017.     PET scan in May 2017 showed progression. I recommended trying Tecentriq which she started end of May 2017.   PET scan in August 2017 showed partial response.  However in late September 2017 she started to have elevation of her liver function test which required me to stop her treatment.  In spite of high dose of prednisone her liver function test continue to get worse.  She had PET scan done November 2017 which showed that she had developed new liver metastases.      Due to the fact that her tumor molecular profiling on strata testing showed that she had BRAF   mutation I prescribed dabrafenib and Trimetinib.  She started those medications sometime in the first of the second week of December 2017. She was diagnosed to have a DVT in her left calf on December 7, 2017.  She has been started on anticoagulation.  She eventually was unable to handle those medications and stopped them I believe 12 December 2017.  Her PET scan in January 2018 actually showed that she had significant improvement in her disease which was surprising.  In my opinion it most likely represented delayed response to TECCENTRIQ or some spontaneous regression.  My impression at that time was that it is  unlikely that she responded to few pills of Trimetinib and dabrafenib.    Then in the beginning of April her CT scan showed progression mostly in the lymph nodes of the mediastinum and upper abdomen.  We discussed treatment options and interventions.  We discussed going to TECCENTRIQ or resuming dabrafenib and Trimetinib.  She eventually started with the oral pills.  She took them for about a week or so the last dose was sometime on 15 April 2018.  She was admitted at [facility] with fever which were felt to be secondary to these medications.    In May 2018 she was recommended to go back on treatment with dabrafenib and trimatinib but only one dose of each once a week. She is able to take it.  Then we decreased the time between the doses to every 6 day in middle of June 2018.  Then in August 2018 she increased it to half the dose of Dabarafenib (75mg two times a day ) and full dose of Trametinib (2mg daily).  She has been handling it well.  Her scan in November 2018 showed stable disease.    She comes in today for follow-up.  Since her last visit here in November 2018 she has had several issues with memory.  She has significant memory loss and recollection issues.  She has been seen by a neurologist.  She also had an MRI done which was negative for any metastatic disease.  She has been prescribed Namenda for memory issues.  She did stop taking her dabrafenib and Trimetinib in the last week of January 2019.  She also has some bruising on her ear lobe.        Past Medical History     Past Medical History:   Diagnosis Date     A-fib (H)      Anemia      Cancer of lung (H)      Carotid stenosis      Chronic kidney disease      CKD (chronic kidney disease)      Coronary artery disease      Diabetes mellitus (H) 11/1/2016     Diabetes mellitus, type II (H)      Exacerbation of asthma 4/4/2007     High cholesterol      HTN (hypertension)      Hyperlipidemia      Hypertension      Peripheral neuropathy      Pleural  effusion      Primary lung adenocarcinoma (H)     Stage IV NSCLC     Upper GI bleed      Review of Systems   Constitutional  Constitutional (WDL): All constitutional elements are within defined limitsDenies any complaints.  Neurosensory  Neurosensory (WDL): Exceptions to WDL  Peripheral Motor Neuropathy: Asymptomatic, clinical or diagnostic observations only, intervention not indicated  Ataxia: Asymptomatic, clinical or diagnostic observations only, intervention not indicated  Peripheral Sensory Neuropathy: Moderate symptoms, limiting instrumental ADL(hands and feet)  Cardiovascular  Cardiovascular (WDL): Exceptions to WDL  Edema: Yes  Edema Limbs: 5 - 10% inter-limb discrepancy in volume or circumference at point of greatest visible difference, swelling or obscuration of anatomic architecture on close inspection(ankles)  Pulmonary     Gastrointestinal  Gastrointestinal (WDL): Exceptions to WDL  Anorexia: Loss of appetite without alteration in eating habits  Genitourinary  Genitourinary (WDL): All genitourinary elements are within defined limits  Integumentary  Integumentary (WDL): Exceptions to WDL(sore on left ear)  Patient Coping  Patient Coping: Accepting;Sadness  ECOG Performance   1  Pain Status  Currently in Pain: No/denies  Accompanied by  Accompanied by: Family Member(daughter)      Vital Signs     Vitals:    02/14/19 0941   BP: 140/62   Pulse: 65   Temp: 97.4  F (36.3  C)   SpO2: 98%       Physical Exam     GENERAL: no acute distress. Cooperative in conversation.   HEENT: Starting to have alopecia. pupils are equal, round and reactive. Oral mucosa is clean and intact. No ulcerations or mucositis noted. No bleeding noted.  RESP:Chest symmetric lungs are clear bilaterally per auscultation. Regular respiratory rate. No wheezes or rhonchi.    CV: Normal S1 S2 Regular, rate and rhythm.   ABD: Nondistended, soft, slight epigastric tenderness. Positive bowel sounds. No organomegaly.   EXTREMITIES: No lower  extremity edema.  Noticing slight increased edema around the lower part of her ankles.  NEURO: non focal. Alert and oriented x3.  No obvious neuro deficit.  PSYCH: within normal limits. No depression or anxiety.  SKIN: warm dry intact .      Lab Results     Results for orders placed or performed in visit on 02/14/19   Comprehensive Metabolic Panel   Result Value Ref Range    Sodium 141 136 - 145 mmol/L    Potassium 4.4 3.5 - 5.0 mmol/L    Chloride 105 98 - 107 mmol/L    CO2 28 22 - 31 mmol/L    Anion Gap, Calculation 8 5 - 18 mmol/L    Glucose 235 (H) 70 - 125 mg/dL    BUN 34 (H) 8 - 28 mg/dL    Creatinine 1.58 (H) 0.60 - 1.10 mg/dL    GFR MDRD Af Amer 38 (L) >60 mL/min/1.73m2    GFR MDRD Non Af Amer 31 (L) >60 mL/min/1.73m2    Bilirubin, Total 0.5 0.0 - 1.0 mg/dL    Calcium 9.9 8.5 - 10.5 mg/dL    Protein, Total 6.2 6.0 - 8.0 g/dL    Albumin 3.3 (L) 3.5 - 5.0 g/dL    Alkaline Phosphatase 69 45 - 120 U/L    AST 24 0 - 40 U/L    ALT 14 0 - 45 U/L   INR   Result Value Ref Range    INR 4.12 (H) 0.90 - 1.10   HM1 (CBC with Diff)   Result Value Ref Range    WBC 11.7 (H) 4.0 - 11.0 thou/uL    RBC 4.05 3.80 - 5.40 mill/uL    Hemoglobin 11.2 (L) 12.0 - 16.0 g/dL    Hematocrit 35.8 35.0 - 47.0 %    MCV 88 80 - 100 fL    MCH 27.7 27.0 - 34.0 pg    MCHC 31.3 (L) 32.0 - 36.0 g/dL    RDW 16.1 (H) 11.0 - 14.5 %    Platelets 257 140 - 440 thou/uL    MPV 11.0 8.5 - 12.5 fL    Neutrophils % 79 (H) 50 - 70 %    Lymphocytes % 7 (L) 20 - 40 %    Monocytes % 8 2 - 10 %    Eosinophils % 5 0 - 6 %    Basophils % 1 0 - 2 %    Neutrophils Absolute 9.2 (H) 2.0 - 7.7 thou/uL    Lymphocytes Absolute 0.8 0.8 - 4.4 thou/uL    Monocytes Absolute 1.0 (H) 0.0 - 0.9 thou/uL    Eosinophils Absolute 0.6 (H) 0.0 - 0.4 thou/uL    Basophils Absolute 0.1 0.0 - 0.2 thou/uL     *Note: Due to a large number of results and/or encounters for the requested time period, some results have not been displayed. A complete set of results can be found in Results  Review.         Distress assessment and ECOG status      ECOG Performance:    ECOG Performance Status: 1    Distress Assessment  Distress Assessment Score: Extreme distress       Imaging Results       Mr Brain With Without Contrast    Result Date: 1/24/2019  [facility] RADIOLOGY EXAM: MR BRAIN W WO CONTRAST LOCATION: [facility] DATE/TIME: 1/24/2019 12:29 PM INDICATION: Neoplasm: head, metastatic, recurrence, suspected/known lung ca, confusion. eval brain mets COMPARISON: None. CONTRAST: Gadavist 4ml TECHNIQUE: Routine multiplanar multisequence head MRI without and with intravenous contrast. FINDINGS: INTRACRANIAL CONTENTS: No acute or subacute infarct. No mass, acute hemorrhage, or extra-axial fluid collections. Scattered nonspecific T2/FLAIR hyperintensities within the cerebral white matter most consistent with mild chronic microvascular ischemic change. Mild generalized cerebral atrophy. No hydrocephalus. Mild cerebellar atrophy. No pathologic enhancement. Intracranial flow voids are patent. SELLA: No significant abnormality accounting for technique. OSSEOUS STRUCTURES/SOFT TISSUES: No aggressive osseous lesion involving the calvarium, skull base, or visualized upper cervical spine. The major intracranial vascular flow voids are maintained. ORBITS: No significant abnormality accounting for technique. SINUSES/MASTOIDS: No significant paranasal sinus mucosal disease. No significant middle ear or mastoid effusion.     CONCLUSION: 1.  No intracranial metastatic disease. 2.  No acute infarct, mass, or hemorrhage. 3.  Age-related changes.     Ct Chest Abdomen Pelvis With Oral Wo Iv    Result Date: 2/12/2019  [facility] RADIOLOGY EXAM: CT CHEST ABDOMEN PELVIS W ORAL WO IV CONTRAST LOCATION: [facility] DATE/TIME: 2/12/2019 10:50 AM INDICATION: Lung cancer F/U. COMPARISON: None. TECHNIQUE: Helical thin-section CT scan of the chest, abdomen, and pelvis was performed without IV contrast. Multiplanar reformats  were obtained. Dose reduction techniques were used. CONTRAST: None. FINDINGS: CHEST: Stable very tiny lung nodules including a left lower lobe nodule image 52, right middle lobe nodule image 63, right lower lobe nodule image 70 and a 6 mm nodule right lower lobe image 87 all stable. No new findings in the lungs. Single enlarging anterior mediastinal lymph node measures 1.5 cm on image 94 just to the left of the aortic arch. Stable upper periesophageal lymph node on image 55 measures 6 mm.  ABDOMEN: No definable liver lesion on CT without IV contrast. Marked atrophy right kidney unchanged. Single left periaortic lymph node on image 256 measures 12 mm. Previously I measure this at 8 mm. No other enlarged retroperitoneal nodes. PELVIS: Negative. No pelvic lymphadenopathy. MUSCULOSKELETAL: Negative.     CONCLUSION: 1.  Very mild enlargement of a single anterior mediastinal lymph node and a single left periaortic retroperitoneal lymph node concerning for possible progression of tumor. 2.  No other significant changes. 3.  The previous liver lesion not clearly seen today.          Signed by: Jemal Evans MD

## 2021-06-25 NOTE — PROGRESS NOTES
INR result reviewed with Kay CHANEL. Patient is to increase coumadin to 2 mg on Tues and 4 mg all other days. INR is to be rechecked in 2 weeks. I called [facility] to report this.

## 2021-06-25 NOTE — PROGRESS NOTES
Progress Notes by Carl Sandoval MD at 4/13/2017 12:50 PM     Author: Carl Sandoval MD Service: -- Author Type: Physician    Filed: 4/13/2017  1:14 PM Encounter Date: 4/13/2017 Status: Signed    : Carl Sandoval MD (Physician)           Click to link to [facility]     [facility] HEART CARE NOTE    Thank you, Dr. Garcia, for asking us to see Blanca Oreilly at the [facility].      Assessment/Recommendations   She is quite stable from a cardiac standpoint.  I have decreased her amlodipine to see if these low blood pressures will go away and she will report back of her blood pressures significantly rise.  She will also follow-up with Dr. Garcia.  I have not set her up for routine follow-up, but a course would be happy to see her at any time should questions or problems arise that I can be of help with.         History of Present Illness    Ms. Blanca Oreilly is a 83 y.o. female with known hypertension, coronary artery disease, paroxysmal atrial fibrillation and lung cancer.  Her lung cancer seems to be stable.  She is taking a fair amount of chemotherapy.  She reports that some blood pressures are elevated brings in a quite a list.  She also has some that are low and the majority are in the normal range.  When he gets very low she can feel lightheaded and even presyncopal.  She has not had orthopnea, paroxysmal nocturnal dyspnea, peripheral edema or ale syncope.  She denies any chest pains.         Physical Examination Review of Systems   Vitals:    04/13/17 1244   BP: 144/57   Pulse: 66     Body mass index is 22.3 kg/(m^2).  Wt Readings from Last 3 Encounters:   04/13/17 118 lb (53.5 kg)   04/05/17 119 lb (54 kg)   03/27/17 119 lb 4.8 oz (54.1 kg)     General Appearance:   Alert, cooperative and in no acute distress.   ENT/Mouth: Oral mucuos membranes pink and moist .      EYES:  No scleral icterus. No Xanthelasma.    Neck: JVP normal. No Hepatojugular reflux.  Thyroid not visualized   Chest/Lungs:   Lungs are clear to auscultation, equal chest wall expansion    Cardiovascular:   S1, S2 without murmur ,clicks or rubs. Brachial, radial and posterior tibial pulses are intact and symetric. No carotid bruits noted   Abdomen:  Nontender. BS+.       Extremities: No cyanosis, clubbing or edema   Skin: no xanthelasma, warm.    Psych: Appropriate affect.   Neurologic: normal gait, normal  bilateral, no tremors        General: WNL  Eyes: WNL  Ears/Nose/Throat: WNL  Lungs: WNL  Heart: Chest Pain, Shortness of Breath with activity, Irregular Heartbeat  Stomach: Diarrhea  Bladder: Frequent Urination at Night  Muscle/Joints: WNL  Skin: WNL  Nervous System: WNL  Mental Health: WNL     Blood: WNL     Medical History  Surgical History Family History Social History   Past Medical History:   Diagnosis Date   ? A-fib    ? Anemia    ? Cancer of lung    ? Carotid stenosis    ? Chronic kidney disease    ? CKD (chronic kidney disease)    ? Coronary artery disease    ? Diabetes mellitus 11/1/2016   ? Diabetes mellitus, type II    ? Exacerbation of asthma 4/4/2007   ? High cholesterol    ? HTN (hypertension)    ? Hyperlipidemia    ? Hypertension    ? Peripheral neuropathy    ? Pleural effusion    ? Primary lung adenocarcinoma     Stage IV NSCLC   ? Upper GI bleed     Past Surgical History:   Procedure Laterality Date   ? ADENOIDECTOMY     ? CATARACT EXTRACTION W/ INTRAOCULAR LENS  IMPLANT, BILATERAL     ? ESOPHAGOGASTRODUODENOSCOPY N/A 9/6/2016    Procedure: ESOPHAGOGASTRODUODENOSCOPY with BICAP cautery use and Biopsies;  Surgeon: Ezio Shane MD;  Location: [facility];  Service:    ? pluerex catheter placement     ? IL EXCISE BREAST CYST      Description: Breast Surgery Lumpectomy;  Recorded: 08/28/2013;   ? TONSILLECTOMY      Family History   Problem Relation Age of Onset   ? Kidney disease Mother    ? Heart disease Father    ? Heart disease Sister    ? Kidney disease Sister    ?  Stroke Sister    ? No Medical Problems Daughter    ? No Medical Problems Daughter    ? No Medical Problems Son    ? No Medical Problems Son     Social History     Social History   ? Marital status:      Spouse name: N/A   ? Number of children: N/A   ? Years of education: N/A     Occupational History   ? Not on file.     Social History Main Topics   ? Smoking status: Former Smoker     Packs/day: 0.10     Years: 30.00     Quit date: 10/30/1980   ? Smokeless tobacco: Never Used      Comment: Not a steady smoker   ? Alcohol use No   ? Drug use: No   ? Sexual activity: Not on file     Other Topics Concern   ? Not on file     Social History Narrative     - lives with her           Medications  Allergies   Current Outpatient Prescriptions   Medication Sig Dispense Refill   ? aspirin 81 MG EC tablet Take 1 tablet (81 mg total) by mouth daily with breakfast.  0   ? atorvastatin (LIPITOR) 80 MG tablet One half daily (Patient taking differently: Take 40 mg by mouth bedtime. ) 90 tablet 3   ? CARAFATE 100 mg/mL suspension 100 mg daily.  0   ? coenzyme Q10 (CO Q-10) 10 mg capsule Take 1 capsule by mouth daily.      ? isosorbide mononitrate (IMDUR) 30 MG 24 hr tablet Take 1 tablet (30 mg total) by mouth daily. 90 tablet 3   ? LANTUS SOLOSTAR 100 unit/mL (3 mL) pen Inject 8 Units under the skin bedtime. 11.65 Type 2 with hyperglycemia 3 mL PRN   ? loperamide (IMODIUM) 2 mg capsule Take 2 mg by mouth 4 (four) times a day as needed for diarrhea.     ? magnesium 250 mg Tab Take 500 mg by mouth daily.      ? metoprolol succinate (TOPROL-XL) 50 MG 24 hr tablet Take 1.5 tablets (75 mg total) by mouth bedtime. 90 tablet 3   ? multivitamin (ONE A DAY) per tablet Take 1 tablet by mouth daily.     ? nitroglycerin (NITROSTAT) 0.4 MG SL tablet Place 1 tablet (0.4 mg total) under the tongue every 5 (five) minutes as needed for chest pain. 10 tablet 1   ? ranitidine (ZANTAC) 150 MG tablet TAKE 1 TABLET BY MOUTH TWICE DAILY  "(Patient taking differently: Take 150 mg by mouth 2 (two) times a day. ) 180 tablet 3   ? sucralfate (CARAFATE) 1 gram tablet One gm four times daily as needed for gastritis 120 tablet 5     No current facility-administered medications for this visit.       Allergies   Allergen Reactions   ? Ciprofloxacin Other (See Comments)     \"Ankle pain\"   ? Iodinated Contrast Media - Oral And Iv Dye Hives and Other (See Comments)     CKD   ? Penicillins Swelling     Lip swelling.    ? Protonix [Pantoprazole] Other (See Comments)     Renal failure         Lab Results    Chemistry/lipid CBC Cardiac Enzymes/BNP/TSH/INR   Lab Results   Component Value Date    CHOL 169 01/16/2017    HDL 50 01/16/2017    LDLCALC 99 01/16/2017    TRIG 102 01/16/2017    CREATININE 1.42 (H) 04/05/2017    BUN 26 04/05/2017    K 4.0 04/05/2017     04/05/2017     04/05/2017    CO2 29 04/05/2017    Lab Results   Component Value Date    WBC 12.9 (H) 04/05/2017    HGB 10.5 (L) 04/05/2017    HCT 31.8 (L) 04/05/2017    MCV 88 04/05/2017     04/05/2017    Lab Results   Component Value Date    CKMB <1 10/22/2015    TROPONINI <0.01 01/18/2017    BNP 39 01/14/2015    TSH 2.20 01/18/2017    INR 1.11 (H) 09/07/2016                                                                   "

## 2021-06-25 NOTE — TELEPHONE ENCOUNTER
Wrong dept associated with this encounter. Encounter closed and new encounter started.    Gina Pope, RN, BSN, PHN  [facility] Medication Refill Nurse  3/15/2019  8:14 AM

## 2021-07-03 NOTE — ADDENDUM NOTE
Addendum Note by Ora Sheikh RN at 7/18/2019  9:45 AM     Author: Ora Sheikh RN Service: -- Author Type: Registered Nurse    Filed: 7/18/2019 10:31 AM Encounter Date: 7/18/2019 Status: Signed    : Ora Sheikh RN (Registered Nurse)    Addended by: ORA SHEIKH on: 7/18/2019 10:31 AM        Modules accepted: Orders

## 2021-07-03 NOTE — ADDENDUM NOTE
Addendum Note by Felicia Cohn DO at 1/22/2019  3:40 PM     Author: Felicia Cohn DO Service: -- Author Type: Physician    Filed: 1/23/2019  2:53 PM Encounter Date: 1/22/2019 Status: Signed    : Felicia Cohn DO (Physician)    Addended by: FELICIA COHN on: 1/23/2019 02:53 PM        Modules accepted: Orders

## 2021-07-03 NOTE — ADDENDUM NOTE
Addendum Note by Ora Sheikh RN at 8/1/2019 10:30 AM     Author: Ora Sheikh RN Service: -- Author Type: Registered Nurse    Filed: 8/1/2019  1:06 PM Encounter Date: 8/1/2019 Status: Signed    : Ora Sheikh RN (Registered Nurse)    Addended by: ORA SHEIKH on: 8/1/2019 01:06 PM        Modules accepted: Orders

## 2021-07-03 NOTE — ADDENDUM NOTE
Addendum Note by Kate Thomas RN at 3/18/2019 10:00 AM     Author: Kate Thomas RN Service: -- Author Type: Registered Nurse    Filed: 3/18/2019 10:09 AM Encounter Date: 3/18/2019 Status: Signed    : Kate Thomas RN (Registered Nurse)    Addended by: KATE THOMAS on: 3/18/2019 10:09 AM        Modules accepted: Orders

## 2021-07-03 NOTE — ADDENDUM NOTE
Addendum Note by Kate Thomas RN at 7/30/2019 11:15 AM     Author: Kate Thomas RN Service: -- Author Type: Registered Nurse    Filed: 7/30/2019 12:19 PM Encounter Date: 7/30/2019 Status: Signed    : Kate Thomas RN (Registered Nurse)    Addended by: KATE THOMAS on: 7/30/2019 12:19 PM        Modules accepted: Orders

## 2024-10-28 NOTE — TELEPHONE ENCOUNTER
Refill Approved    Rx renewed per Medication Renewal Policy. Medication was last renewed on 1/19/17.    Ov: 1/22/19    Gina Pope, [facility] Triage/Med Refill 3/15/2019     Requested Prescriptions   Pending Prescriptions Disp Refills     nitroglycerin (NITROSTAT) 0.4 MG SL tablet 10 tablet 1     Sig: Place 1 tablet (0.4 mg total) under the tongue every 5 (five) minutes as needed for chest pain.    Nitroglycerin Refill Protocol Passed - 3/15/2019  8:15 AM       Passed - Visit with PCP or prescribing provider visit in last 6 months or next 3 months    Last office visit with prescriber/PCP: 10/26/2018 OR same dept: 1/22/2019 Semaj Cohn DO OR same specialty: 1/22/2019 Semaj Cohn DO Last physical: Visit date not found Last MTM visit: Visit date not found     Next appt within 3 mo: Visit date not found  Next physical within 3 mo: Visit date not found  Prescriber OR PCP: Marco Garcia MD  Last diagnosis associated with med order: 1. Abnormal nuclear stress test  - nitroglycerin (NITROSTAT) 0.4 MG SL tablet; Place 1 tablet (0.4 mg total) under the tongue every 5 (five) minutes as needed for chest pain.  Dispense: 10 tablet; Refill: 1    If protocol passes may refill for 6 months if within 3 months of last provider visit (or a total of 9 months).                           n/a